# Patient Record
Sex: MALE
[De-identification: names, ages, dates, MRNs, and addresses within clinical notes are randomized per-mention and may not be internally consistent; named-entity substitution may affect disease eponyms.]

---

## 2020-02-11 ENCOUNTER — NURSE TRIAGE (OUTPATIENT)
Dept: OTHER | Facility: CLINIC | Age: 75
End: 2020-02-11

## 2023-09-03 ENCOUNTER — NURSE TRIAGE (OUTPATIENT)
Dept: OTHER | Facility: CLINIC | Age: 78
End: 2023-09-03

## 2024-02-22 NOTE — PROGRESS NOTES
"Reason for admission to [facility] for post-acute rehab septic shock , right leg cellulitis, and Atrial Fibrillation    Subjective   Mr. Ortega 78 y.o. year old male was admitted to [facility] unit on 2/22/24. Patient is seen for the initial SNF visit on 2/23/24. 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    Admitted from: [facility] hospital  Facility: [facility]  Dates of hospitalization: 2/16-2/22/24  Hospital Course:  Admitted for low blood pressures and went to the ICU for severe sepsis. Had BEKA with ATN on ckd3b. Seen by nephrology and cardiology. Propranolol stopped due to bradycardia. Kidney function improved. Gabapentin dose reduced. Treated for cellulitis of R leg. Had elevated troponin on admission. Also had hyponatremia, hypervolemia, anemia, long standing persistent afib. D/bri to [facility] 2/22 for rehab on renal diet, 2 gram sodium restriction and 1.2L fluid restriction.   Per discharge summary   \"Brady Ortega is a 78 year old male presented with past medical history of Parkinsonism, , CKD stage 3 with baseline Cr 1.3-1.6, suspected cardiac amyloidosis, PAF with slow VR, HTN, HPL, BPH, CAD, h/o lung cancer s/p LLL lobectomy who was initially admitted with cellulitis but then developed hypotension due to afib with slow VR and transferred to ICU.  In ICU, he received 1 dose of atropine with improvement in BP. Never required pressors. Did have BEKA over CKD and nephrology following.    Principal Problem:  Cellulitis of right lower extremity - resolved   --finished course of abx; appreciate ID input   Paronychia - R 4th finger   -id seeing o/p consulted here appreciate input - finished course of rx   Active " "Problems:  BEKA  CKD 3b  --nephrology following  --Cr peaked at 2.8, baseline is 1.3-1.6  --suspect ATN in setting of hypotension and also bactrim  --monitor Cr  --continue sensipar   Elevated troponin  --likely demand ischemic in setting of shock/BEKA  --no intervention needed  Chronic diastolic CHF  Suspected cardiac amyloidosis  Paroxysmal atrial fib with slow VR  --propranolol on hold due to bradycardia  --no evidence for decompensated CHF  --continue telemetry  --continue eliquis  Hyponatremia  --initially thought to be due to BEKA ; now more volume overload per nephrology  --fluid restriction and torsemide   Parkinsonism  Essential tremor  --continue sinemet  --propranolol on hold due to bradycardia  HTN  --no meds  BPH  --continue hytrin  Elevated TSH  --has been chronically elevated for at least 5 months  --on synthroid 88 mcg  --may need dose adjustment  --will defer to OP  --check repeat as OP  --possible early subclinical hypothyroidism\"     Primary caregiver: patient and wife  : wife: becca Ortega Office: [phone]   Home: [phone]    PCP: ashwini carpenter- [facility] [place].     HPI     Per patient:  - pt denies sob, cp, dizziness or lightheadedness  - states that he hasn't had trouble urinating like this in the past  - it started yesterday. He only urinated twice and small amounts. And today couldn't urinate and had suprapubic discomfort and bloating  - PVR showed 800cc in bladder.   - feels much better after the straight cath to remove the urine.   - and notes that his bowels started moving after as well  - has both parkinsons and essential tremor. Has been on sinemet for awhile. Does feel it helps.   - at home he was only taking 2 tabs 7am, 11am, 3pm, and then 2.5 tabs at 7pm.   - feeling very weak overall now.     Per wife  - pt started having burning with urination today   - kaela was taken out at the hospital on 2/21. He did pass trial of void in the hospital.   - pt " is being worked up for amyloidosis. Was going to have an MRI but then ended up in the hospital  - doses of torsemide have been fluctuating. Has had leg swelling in both legs. Monitors his weight daily. The dose was increased about 2 weeks ago to 20mg alternating with 40mg. But he gained weight. And so was supposed to take 40mg daily for 4-5 days but ended up in hospital.   - in hospital, they held his torsemide because of low bps initially. He got 20mg on 2/21 and got at least 20mg on 2/22. May have actually gotten 40mg that day.   - has history of back infection in 10/2023. Pt was hospitalized for weakness, confusion, chills, and back pain. Was hypotensive, febrile and bradycardic. Bcx positive for strept dysglactiae. CT abd/pelvis didn't show source. Echo didn't show any valve vegetations. MRI lumbar spine showed possible discitis at L4-5 with 2 collections along left psoas muscle. Also had left knee pain and joint fluid showed urate crystals. Treated with colchicine and anakinra for 3 days. And was treated with vanc/levoquin for bactermia. D/bri on levaquin for 6 weeks.   - had been given tramadol in the hospital in October for back pain and was d/bri with some. Took it a couple of times only.   - she asks for some tramadol prn here just in case  - at home he was taking tylenol 3-4 times per day   - also had been prescribed gabapentin after hospitalization at 100mg HS. And dr. Sanchez increased it to 200mg TID. But it made him very sleepy and so they stopped it. But was getting it in hospital again.   - has history of bad athlete's foot. Was being treated with fluconazole by ID    Living environment prior to admission: lives in house with wife,     Prior to hospitalization, he was driving and walking with cane.     Assistive Devices: straight cane    ADLS prior to admission   Is dependant or requires assistance in the following BADL: none   Is dependant or requires assistance in the following Instrumental ADL:   was driving. Was managing meds.     Advanced Directives on file: full      Medications reviewed and reconciled.     Objective     Physical Exam  Vitals: 113//73, HR 55-68, T 97.5, 18, 95% RA. Wt 132 lbs  Constitutional:  Well-nourished, kempt  Head: NC/AT  Eyes: no scleral injection or icterus    Dentition: fair    Oropharyx: clear    Neck: supple. trachea midline. Thyroid not enlarged  Lymphatics: No adenopathy at neck  Respiratory: clear without rales, rhonchi or wheezes  Cardiovascular: RRR, +S1, S2, no murmurs appreciated    Extremeties: 2+ pitting edema in both legs.  No lubbing, cyanosis  Gastrointestinal: +BS, soft, nontender. Slightly distended  Genitourinary: no sherwood. Still has slight suprapubic distension  Integumentary: No ulcers, pressure sores. Right leg is not erythematous or warm. But does have petechial lesions on lower leg. Markings from outline of his cellulitis from the hospital.   Has overhanging skin on pinky side of the R 4th finger nail. Doesn't appear infected. Was previously a paranoychia  Musculoskeletal:    Contractures: none    Muscle bulk: nl    Digits/nails: dried and peeling area between 1st and 2nd toes of left foot    Effusions: none   Neurologic:  Pill rolling tremor in right first finger and fine tremor in bboth hands when outstretched.     CNII-XII: grossly intact     tone: mostly normal in both arms   Psychologic: affect full     Labs done at [facility] 2/23/24  Glucose 108, na 132, potassium 4.7, cl 97, c02 18, bun 80, creatinine 2.9, calcium 8.3,   Wbc 9.5, hgb 11.9, hematocrit 36.0, plts 16t6, mcv 91.5.   Vitamin D 25-OH pending    2/22/24  Glucose  74 - 99 mg/dL 98   BUN  9 - 24 mg/dL 73 High    Creatinine  0.73 - 1.22 mg/dL 2.11 High    Sodium  136 - 144 mmol/L 129 Low    Potassium  3.7 - 5.1 mmol/L 4.7   Chloride  97 - 105 mmol/L 97   CO2  22 - 30 mmol/L 21 Low    Anion Gap  9 - 18 mmol/L 11   Calcium, Total  8.5 - 10.2 mg/dL 8.2 Low    Estimated Glomerular Filtration  "Rate  >=60 mL/min/1.73m² 31 Low      Albumin  3.9 - 4.9 g/dL 3.0 Low    Calcium, Total  8.5 - 10.2 mg/dL 8.2 Low    Phosphorus  2.7 - 4.8 mg/dL 3.6     BUN  9 - 24 mg/dL 73 High    Creatinine  0.73 - 1.22 mg/dL 2.11 High    Sodium  136 - 144 mmol/L 129 Low    Potassium  3.7 - 5.1 mmol/L 4.7   Chloride  97 - 105 mmol/L 97   CO2  22 - 30 mmol/L 21 Low    Anion Gap  9 - 18 mmol/L 11   Estimated Glomerular Filtration Rate  >=60 mL/min/1.73m² 31 Low    Magnesium 2.0  WBC  3.70 - 11.00 k/uL 7.70   Hemoglobin  13.0 - 17.0 g/dL 10.0 Low    Hematocrit  39.0 - 51.0 % 29.9 Low    MCV  80.0 - 100.0 fL 91.2   RDW-CV  11.5 - 15.0 % 15.4 High    Platelet Count  150 - 400 k/uL 105 Low        creatinine  Date Value Ref Range Status   02/22/2024 2.11 (H) 0.73 - 1.22 mg/dL Final   02/22/2024 2.11 (H) 0.73 - 1.22 mg/dL Final   02/21/2024 2.43 (H) 0.73 - 1.22 mg/dL Final   02/20/2024 2.54 (H) 0.73 - 1.22 mg/dL Final     Phosphorus 3.9 (2/20/24)    TSH 6.53 (2/26/24).  5.06 (2/2/24)  Free T4 1.4    NT pro BNP 17,419 (2/16/24)     (2/16/24)    Iron 26, tibc 306, transferrin sat 8.5%    Kidney ultrasound 2/20/24  \"IMPRESSION:   NO HYDRONEPHROSIS.   Nonobstructing LEFT nephrolithiasis.   Multiple bilateral renal cysts, some minimally complex.   1.2 cm echogenic indeterminate RIGHT interpolar renal lesion corresponds to above fluid attenuation lesion on prior CT and is small to characterize may represent a hyperdense cyst or small renal neoplasm. \"    CXR: IMPRESSION: 2/16/24  No acute congestion.  Possible small bilateral pleural effusions.      Xray tib fib 2/16/24  \"Impression:   Right tib-fib negative for fracture.   No radiographic evidence for osteomyelitis. If osteomyelitis is a   clinical concern, a more sensitive examination such as MRI or nuclear  medicine bone scan should be obtained. \"    CT abd/pelvis 10/2023  \"Liver:  Slight interval increase since 2016 in the RIGHT lobe   low-attenuation lesions; 2.9 cm segment VII " "previously 1.5 cm (3:33), 1.4 cm segment VI lesion previously 0.8 cm (3:56) and a 2.6 cm segment V lesion along the gallbladder fossa previously 2.2 cm (3:59).   Biliary: Cholelithiasis.   Spleen: No splenomegaly.   Pancreas: Unremarkable.   Adrenals: No mass.   Kidneys:   Right: 1.2 cm exophytic hyperattenuating lesion likely a   hemorrhagic/proteinaceous cyst (3:44). Tiny lower pole calculus. No hydronephrosis.   Left: 1.5 cm hyperattenuating lesion likely a hemorrhagic/proteinaceous cyst (3:60).  Simple right upper pole cyst.  5 mm lower pole calculus. No hydronephrosis.   GI Tract: No bowel dilation.   Lymph Nodes: No lymphadenopathy.   Mesentery/peritoneum: No organized fluid collection.  Moderate volume ascites.   Retroperitoneum: No mass.   Vasculature: Arterial atherosclerotic disease without aneurysm.   Pelvis: Ascites is more confluent along the right anterior pelvis.  No mass.  Moderately distended urinary bladder without focal wall thickening.   Bones/Soft Tissues: LEFT hip arthroplasty.  Degenerative changes but no destructive lesion..   Lower thorax: Small right pleural effusion with adjacent atelectasis.  Trace left pleural effusion.  Left lower lobe loculated sub-pulmonic pneumothorax similar to the recent chest CT. Postsurgical changes of left lower lobectomy. \"    Assessment/Plan    Right lower leg cellulitis  - xray in hospital did not show evidence of osteomyelitis. He was treated with vanc and zosyn for 7 days in the hospital. The leg currently is not erythematous or warm but has resolving fine petechiae     2. Physical deconditioning  . Prior to hospitalization, had been walking with cane. Now is significantly weak and unstable with ambulation. PT, OT- eval and treat    3. Urinary retention   4. Dysuria   - he had a sherwood catheter in the hospital. This was removed on 2/21. He tolerated trial of void per pt and wife. Was urinating well until today. Has had decreased urine output and reported " dysuria. A post void bladder scan today showed 800+ml in bladder. He was straight cathed. He is on terazosin 5mg HS. Will send urine for UA and C&S. Will start him empirically on ciprofloxacin 250mg BID for 7 days.     5. BEKA on CKD  - creatinine went up to 2.8 in the hospital. But did improve to 2.1 on 2/22. Today his creatinine went back up to 2.9 and BUN 80. Urinary retention and infection likely contributing. Will do STAT renal panel 2/24    5. Hyponatremia  - his sodium was 129 on 2/22, and 132 today. Given the increase in bun and creatinine as well from yesterday,do wonder if there is some intravascular contraction. Will recheck renal panel on 2/24    6. Anemia  Hemoglobin was 10 yesterday and is 11.9 today. All of his blood cell counts have increase slightly from yesterday. Do wonder if there is some intravascular contraction. Will repeat cbc 2/24    7. HFpEF, question of cardiac amyloidosis   - in the hospital, it was felt that his hyponatremia was related in part to hypervolemia. His BNP was 17,000. Prior to hospitalization, his torsemide dose was increased to 20mg alternating with 40mgs about 2 weeks ago. But his weight was still increasing. So was told to take 40mg daily for 4-5 days. Had just started it before hospitalization. His torsemide was held for at least 4 days in hospital due to hypotension. Then was resumed 2/21. He has 2+ swelling in legs currently. No shortness of breath. Lungs clear. No hypoxia.  Checking daily weights. Will keep the torsemide at 40mg alternating with 20mg daily for now.      8. Afib with bradycardia  - on apixaban 2.5mg twice daily for anticoagulation. He was previously on propranolol for essential tremor. This was stopped in the hospital due to bradycardia. Will monitor heart rates. Will continue off of     9. Parkinson's disease  10. Essential tremor   - is on carbidopa-levodopa 2 tabs at 7am and 11am, and 2.5 tabs at 3pm and 7pm. He was taking the 2.5 tabs only at 7pm at  home. Can consider reducing to what he was taking at home.     11. Neuropathy in hands   12. Chronic low back pain  - had discitis and bacteremia in 10/2023. After that has had intermittent low back pain. Was taking tylenol regularly at home. Will restart this at 1000mg BID and then afternoon PRN. Also had tramadol prn at home though rarely used it. His wife asks for this to be continued. Will order 50mg BID prn  Was also previously prescribed gabapentin for tingling in his hands. Didn't tolerate it in morning and was mainly taking at night. Will give 100mg HS. He is encouraged to wear nighttime splints for carpal tunnel, though suspect that the neuropathy is more related to cervical disease.     13. Hyperparathyroidism  - his PTH in hospital was 210. His calcium levels are not elevated. Was 8.3 today. Is on cincalcet 30mg every other day. Will continue. Also on vitamin D 1000 units daily. Will continue    14. Hypothyroidism   - is on levothyroxine 88mcg daily. His tsh has been slightly elevated. It was 5.06 on 2/2/24 and 6.53 on 2/26/24. Free T4 was high normal. Will have repeat PFTs in 6-8 weeks with his PCP.     15. Goals of care counseling discussion   - wishes to be full code for now. They did experience having to withdraw care for a daughter in the past.     16. Allergic rhinitis: on ipratropium nasal spray and azelastine nasal spray. Will continue.      17. GERD  - on famotidine 20mg daily at bedtime. Will continue for now but can consider stopping     18. Athlete's foot  - has history of athlete's foot between left 1st and 2nd toes. Also recently had paronychia of R fourth finger.  Had I&D of the santa by derm on 1/24 with cultures growing MRSA and yeast. Finger not currently infected. Was given fluconazole. The plan was to stop it once he left hospital.     Disposition: goal will likely be for him to return home with his wife upon discharge. Will determine his discharge needs with pt/wife and interdiscplinary  team during his stay.     Marilee Ramsey MD

## 2024-02-23 ENCOUNTER — NURSING HOME VISIT (OUTPATIENT)
Dept: POST ACUTE CARE | Facility: EXTERNAL LOCATION | Age: 79
End: 2024-02-23
Payer: COMMERCIAL

## 2024-02-23 DIAGNOSIS — R30.0 DYSURIA: ICD-10-CM

## 2024-02-23 DIAGNOSIS — M54.9 CHRONIC BACK PAIN, UNSPECIFIED BACK LOCATION, UNSPECIFIED BACK PAIN LATERALITY: ICD-10-CM

## 2024-02-23 DIAGNOSIS — R53.81 PHYSICAL DECONDITIONING: ICD-10-CM

## 2024-02-23 DIAGNOSIS — I50.30 HEART FAILURE WITH PRESERVED EJECTION FRACTION, UNSPECIFIED HF CHRONICITY (MULTI): ICD-10-CM

## 2024-02-23 DIAGNOSIS — E87.1 HYPONATREMIA: ICD-10-CM

## 2024-02-23 DIAGNOSIS — N18.9 ACUTE KIDNEY INJURY SUPERIMPOSED ON CKD (CMS-HCC): ICD-10-CM

## 2024-02-23 DIAGNOSIS — L03.115 CELLULITIS OF RIGHT LEG: Primary | ICD-10-CM

## 2024-02-23 DIAGNOSIS — E21.3 HYPERPARATHYROIDISM (MULTI): ICD-10-CM

## 2024-02-23 DIAGNOSIS — I48.91 ATRIAL FIBRILLATION, UNSPECIFIED TYPE (MULTI): ICD-10-CM

## 2024-02-23 DIAGNOSIS — G89.29 CHRONIC BACK PAIN, UNSPECIFIED BACK LOCATION, UNSPECIFIED BACK PAIN LATERALITY: ICD-10-CM

## 2024-02-23 DIAGNOSIS — E03.9 HYPOTHYROIDISM, UNSPECIFIED TYPE: ICD-10-CM

## 2024-02-23 DIAGNOSIS — G20.A1 PARKINSON'S DISEASE WITHOUT DYSKINESIA OR FLUCTUATING MANIFESTATIONS (MULTI): ICD-10-CM

## 2024-02-23 DIAGNOSIS — G62.9 NEUROPATHY: ICD-10-CM

## 2024-02-23 DIAGNOSIS — R33.9 URINARY RETENTION: ICD-10-CM

## 2024-02-23 DIAGNOSIS — N17.9 ACUTE KIDNEY INJURY SUPERIMPOSED ON CKD (CMS-HCC): ICD-10-CM

## 2024-02-23 DIAGNOSIS — D64.9 ANEMIA, UNSPECIFIED TYPE: ICD-10-CM

## 2024-02-23 PROCEDURE — 99306 1ST NF CARE HIGH MDM 50: CPT | Performed by: INTERNAL MEDICINE

## 2024-02-23 RX ORDER — TRAMADOL HYDROCHLORIDE 50 MG/1
50 TABLET ORAL EVERY 12 HOURS PRN
Qty: 10 TABLET | Refills: 0 | Status: SHIPPED | OUTPATIENT
Start: 2024-02-23 | End: 2024-03-05 | Stop reason: SDUPTHER

## 2024-02-23 NOTE — LETTER
"Patient: Brady Ortega  : 1945    Encounter Date: 2024    Reason for admission to [facility] for post-acute rehab septic shock , right leg cellulitis, and Atrial Fibrillation    Subjective   Mr. Ortega 78 y.o. year old male was admitted to [facility] Skilled Nursing unit on 24. Patient is seen for the initial SNF visit on 24. 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    Admitted from: [facility] hospital  Facility: [facility]  Dates of hospitalization: -24  Hospital Course:  Admitted for low blood pressures and went to the ICU for severe sepsis. Had BEKA with ATN on ckd3b. Seen by nephrology and cardiology. Propranolol stopped due to bradycardia. Kidney function improved. Gabapentin dose reduced. Treated for cellulitis of R leg. Had elevated troponin on admission. Also had hyponatremia, hypervolemia, anemia, long standing persistent afib. D/bri to [facility]  for rehab on renal diet, 2 gram sodium restriction and 1.2L fluid restriction.   Per discharge summary   \"Brady Ortega is a 78 year old male presented with past medical history of Parkinsonism, , CKD stage 3 with baseline Cr 1.3-1.6, suspected cardiac amyloidosis, PAF with slow VR, HTN, HPL, BPH, CAD, h/o lung cancer s/p LLL lobectomy who was initially admitted with cellulitis but then developed hypotension due to afib with slow VR and transferred to ICU.  In ICU, he received 1 dose of atropine with improvement in BP. Never required pressors. Did have BEKA over CKD and nephrology following.    Principal Problem:  Cellulitis of right lower extremity - resolved   --finished course of abx; appreciate ID input   Paronychia - R 4th finger   -id seeing o/p " "consulted here appreciate input - finished course of rx   Active Problems:  BEKA  CKD 3b  --nephrology following  --Cr peaked at 2.8, baseline is 1.3-1.6  --suspect ATN in setting of hypotension and also bactrim  --monitor Cr  --continue sensipar   Elevated troponin  --likely demand ischemic in setting of shock/BEKA  --no intervention needed  Chronic diastolic CHF  Suspected cardiac amyloidosis  Paroxysmal atrial fib with slow VR  --propranolol on hold due to bradycardia  --no evidence for decompensated CHF  --continue telemetry  --continue eliquis  Hyponatremia  --initially thought to be due to BEKA ; now more volume overload per nephrology  --fluid restriction and torsemide   Parkinsonism  Essential tremor  --continue sinemet  --propranolol on hold due to bradycardia  HTN  --no meds  BPH  --continue hytrin  Elevated TSH  --has been chronically elevated for at least 5 months  --on synthroid 88 mcg  --may need dose adjustment  --will defer to OP  --check repeat as OP  --possible early subclinical hypothyroidism\"     Primary caregiver: patient and wife  : wife: becca Ortega Office: [phone]   Home: [phone]    PCP: ashwini carpenter- [facility].     HPI     Per patient:  - pt denies sob, cp, dizziness or lightheadedness  - states that he hasn't had trouble urinating like this in the past  - it started yesterday. He only urinated twice and small amounts. And today couldn't urinate and had suprapubic discomfort and bloating  - PVR showed 800cc in bladder.   - feels much better after the straight cath to remove the urine.   - and notes that his bowels started moving after as well  - has both parkinsons and essential tremor. Has been on sinemet for awhile. Does feel it helps.   - at home he was only taking 2 tabs 7am, 11am, 3pm, and then 2.5 tabs at 7pm.   - feeling very weak overall now.     Per wife  - pt started having burning with urination today   - kaela was taken out at the " hospital on 2/21. He did pass trial of void in the hospital.   - pt is being worked up for amyloidosis. Was going to have an MRI but then ended up in the hospital  - doses of torsemide have been fluctuating. Has had leg swelling in both legs. Monitors his weight daily. The dose was increased about 2 weeks ago to 20mg alternating with 40mg. But he gained weight. And so was supposed to take 40mg daily for 4-5 days but ended up in hospital.   - in hospital, they held his torsemide because of low bps initially. He got 20mg on 2/21 and got at least 20mg on 2/22. May have actually gotten 40mg that day.   - has history of back infection in 10/2023. Pt was hospitalized for weakness, confusion, chills, and back pain. Was hypotensive, febrile and bradycardic. Bcx positive for strept dysglactiae. CT abd/pelvis didn't show source. Echo didn't show any valve vegetations. MRI lumbar spine showed possible discitis at L4-5 with 2 collections along left psoas muscle. Also had left knee pain and joint fluid showed urate crystals. Treated with colchicine and anakinra for 3 days. And was treated with vanc/levoquin for bactermia. D/bri on levaquin for 6 weeks.   - had been given tramadol in the hospital in October for back pain and was d/bri with some. Took it a couple of times only.   - she asks for some tramadol prn here just in case  - at home he was taking tylenol 3-4 times per day   - also had been prescribed gabapentin after hospitalization at 100mg HS. And dr. Sanchez increased it to 200mg TID. But it made him very sleepy and so they stopped it. But was getting it in hospital again.   - has history of bad athlete's foot. Was being treated with fluconazole by ID    Living environment prior to admission: lives in house with wife,     Prior to hospitalization, he was driving and walking with cane.     Assistive Devices: straight cane    ADLS prior to admission   Is dependant or requires assistance in the following BADL: none   Is  dependant or requires assistance in the following Instrumental ADL:  was driving. Was managing meds.     Advanced Directives on file: full      Medications reviewed and reconciled.     Objective     Physical Exam  Vitals: 113//73, HR 55-68, T 97.5, 18, 95% RA. Wt 132 lbs  Constitutional:  Well-nourished, kempt  Head: NC/AT  Eyes: no scleral injection or icterus    Dentition: fair    Oropharyx: clear    Neck: supple. trachea midline. Thyroid not enlarged  Lymphatics: No adenopathy at neck  Respiratory: clear without rales, rhonchi or wheezes  Cardiovascular: RRR, +S1, S2, no murmurs appreciated    Extremeties: 2+ pitting edema in both legs.  No lubbing, cyanosis  Gastrointestinal: +BS, soft, nontender. Slightly distended  Genitourinary: no sherwood. Still has slight suprapubic distension  Integumentary: No ulcers, pressure sores. Right leg is not erythematous or warm. But does have petechial lesions on lower leg. Markings from outline of his cellulitis from the hospital.   Has overhanging skin on pinky side of the R 4th finger nail. Doesn't appear infected. Was previously a paranoychia  Musculoskeletal:    Contractures: none    Muscle bulk: nl    Digits/nails: dried and peeling area between 1st and 2nd toes of left foot    Effusions: none   Neurologic:  Pill rolling tremor in right first finger and fine tremor in bboth hands when outstretched.     CNII-XII: grossly intact     tone: mostly normal in both arms   Psychologic: affect full     Labs done at [facility] 2/23/24  Glucose 108, na 132, potassium 4.7, cl 97, c02 18, bun 80, creatinine 2.9, calcium 8.3,   Wbc 9.5, hgb 11.9, hematocrit 36.0, plts 16t6, mcv 91.5.   Vitamin D 25-OH pending    2/22/24  Glucose  74 - 99 mg/dL 98   BUN  9 - 24 mg/dL 73 High    Creatinine  0.73 - 1.22 mg/dL 2.11 High    Sodium  136 - 144 mmol/L 129 Low    Potassium  3.7 - 5.1 mmol/L 4.7   Chloride  97 - 105 mmol/L 97   CO2  22 - 30 mmol/L 21 Low    Anion Gap  9 - 18 mmol/L 11   Calcium,  "Total  8.5 - 10.2 mg/dL 8.2 Low    Estimated Glomerular Filtration Rate  >=60 mL/min/1.73m² 31 Low      Albumin  3.9 - 4.9 g/dL 3.0 Low    Calcium, Total  8.5 - 10.2 mg/dL 8.2 Low    Phosphorus  2.7 - 4.8 mg/dL 3.6     BUN  9 - 24 mg/dL 73 High    Creatinine  0.73 - 1.22 mg/dL 2.11 High    Sodium  136 - 144 mmol/L 129 Low    Potassium  3.7 - 5.1 mmol/L 4.7   Chloride  97 - 105 mmol/L 97   CO2  22 - 30 mmol/L 21 Low    Anion Gap  9 - 18 mmol/L 11   Estimated Glomerular Filtration Rate  >=60 mL/min/1.73m² 31 Low    Magnesium 2.0  WBC  3.70 - 11.00 k/uL 7.70   Hemoglobin  13.0 - 17.0 g/dL 10.0 Low    Hematocrit  39.0 - 51.0 % 29.9 Low    MCV  80.0 - 100.0 fL 91.2   RDW-CV  11.5 - 15.0 % 15.4 High    Platelet Count  150 - 400 k/uL 105 Low        creatinine  Date Value Ref Range Status   02/22/2024 2.11 (H) 0.73 - 1.22 mg/dL Final   02/22/2024 2.11 (H) 0.73 - 1.22 mg/dL Final   02/21/2024 2.43 (H) 0.73 - 1.22 mg/dL Final   02/20/2024 2.54 (H) 0.73 - 1.22 mg/dL Final     Phosphorus 3.9 (2/20/24)    TSH 6.53 (2/26/24).  5.06 (2/2/24)  Free T4 1.4    NT pro BNP 17,419 (2/16/24)     (2/16/24)    Iron 26, tibc 306, transferrin sat 8.5%    Kidney ultrasound 2/20/24  \"IMPRESSION:   NO HYDRONEPHROSIS.   Nonobstructing LEFT nephrolithiasis.   Multiple bilateral renal cysts, some minimally complex.   1.2 cm echogenic indeterminate RIGHT interpolar renal lesion corresponds to above fluid attenuation lesion on prior CT and is small to characterize may represent a hyperdense cyst or small renal neoplasm. \"    CXR: IMPRESSION: 2/16/24  No acute congestion.  Possible small bilateral pleural effusions.      Xray tib fib 2/16/24  \"Impression:   Right tib-fib negative for fracture.   No radiographic evidence for osteomyelitis. If osteomyelitis is a   clinical concern, a more sensitive examination such as MRI or nuclear  medicine bone scan should be obtained. \"    CT abd/pelvis 10/2023  \"Liver:  Slight interval increase since 2016 " "in the RIGHT lobe   low-attenuation lesions; 2.9 cm segment VII previously 1.5 cm (3:33), 1.4 cm segment VI lesion previously 0.8 cm (3:56) and a 2.6 cm segment V lesion along the gallbladder fossa previously 2.2 cm (3:59).   Biliary: Cholelithiasis.   Spleen: No splenomegaly.   Pancreas: Unremarkable.   Adrenals: No mass.   Kidneys:   Right: 1.2 cm exophytic hyperattenuating lesion likely a   hemorrhagic/proteinaceous cyst (3:44). Tiny lower pole calculus. No hydronephrosis.   Left: 1.5 cm hyperattenuating lesion likely a hemorrhagic/proteinaceous cyst (3:60).  Simple right upper pole cyst.  5 mm lower pole calculus. No hydronephrosis.   GI Tract: No bowel dilation.   Lymph Nodes: No lymphadenopathy.   Mesentery/peritoneum: No organized fluid collection.  Moderate volume ascites.   Retroperitoneum: No mass.   Vasculature: Arterial atherosclerotic disease without aneurysm.   Pelvis: Ascites is more confluent along the right anterior pelvis.  No mass.  Moderately distended urinary bladder without focal wall thickening.   Bones/Soft Tissues: LEFT hip arthroplasty.  Degenerative changes but no destructive lesion..   Lower thorax: Small right pleural effusion with adjacent atelectasis.  Trace left pleural effusion.  Left lower lobe loculated sub-pulmonic pneumothorax similar to the recent chest CT. Postsurgical changes of left lower lobectomy. \"    Assessment/Plan    Right lower leg cellulitis  - xray in hospital did not show evidence of osteomyelitis. He was treated with vanc and zosyn for 7 days in the hospital. The leg currently is not erythematous or warm but has resolving fine petechiae     2. Physical deconditioning  . Prior to hospitalization, had been walking with cane. Now is significantly weak and unstable with ambulation. PT, OT- eval and treat    3. Urinary retention   4. Dysuria   - he had a sherwood catheter in the hospital. This was removed on 2/21. He tolerated trial of void per pt and wife. Was urinating " well until today. Has had decreased urine output and reported dysuria. A post void bladder scan today showed 800+ml in bladder. He was straight cathed. He is on terazosin 5mg HS. Will send urine for UA and C&S. Will start him empirically on ciprofloxacin 250mg BID for 7 days.     5. BEKA on CKD  - creatinine went up to 2.8 in the hospital. But did improve to 2.1 on 2/22. Today his creatinine went back up to 2.9 and BUN 80. Urinary retention and infection likely contributing. Will do STAT renal panel 2/24    5. Hyponatremia  - his sodium was 129 on 2/22, and 132 today. Given the increase in bun and creatinine as well from yesterday,do wonder if there is some intravascular contraction. Will recheck renal panel on 2/24    6. Anemia  Hemoglobin was 10 yesterday and is 11.9 today. All of his blood cell counts have increase slightly from yesterday. Do wonder if there is some intravascular contraction. Will repeat cbc 2/24    7. HFpEF, question of cardiac amyloidosis   - in the hospital, it was felt that his hyponatremia was related in part to hypervolemia. His BNP was 17,000. Prior to hospitalization, his torsemide dose was increased to 20mg alternating with 40mgs about 2 weeks ago. But his weight was still increasing. So was told to take 40mg daily for 4-5 days. Had just started it before hospitalization. His torsemide was held for at least 4 days in hospital due to hypotension. Then was resumed 2/21. He has 2+ swelling in legs currently. No shortness of breath. Lungs clear. No hypoxia.  Checking daily weights. Will keep the torsemide at 40mg alternating with 20mg daily for now.      8. Afib with bradycardia  - on apixaban 2.5mg twice daily for anticoagulation. He was previously on propranolol for essential tremor. This was stopped in the hospital due to bradycardia. Will monitor heart rates. Will continue off of     9. Parkinson's disease  10. Essential tremor   - is on carbidopa-levodopa 2 tabs at 7am and 11am, and 2.5  tabs at 3pm and 7pm. He was taking the 2.5 tabs only at 7pm at home. Can consider reducing to what he was taking at home.     11. Neuropathy in hands   12. Chronic low back pain  - had discitis and bacteremia in 10/2023. After that has had intermittent low back pain. Was taking tylenol regularly at home. Will restart this at 1000mg BID and then afternoon PRN. Also had tramadol prn at home though rarely used it. His wife asks for this to be continued. Will order 50mg BID prn  Was also previously prescribed gabapentin for tingling in his hands. Didn't tolerate it in morning and was mainly taking at night. Will give 100mg HS. He is encouraged to wear nighttime splints for carpal tunnel, though suspect that the neuropathy is more related to cervical disease.     13. Hyperparathyroidism  - his PTH in hospital was 210. His calcium levels are not elevated. Was 8.3 today. Is on cincalcet 30mg every other day. Will continue. Also on vitamin D 1000 units daily. Will continue    14. Hypothyroidism   - is on levothyroxine 88mcg daily. His tsh has been slightly elevated. It was 5.06 on 2/2/24 and 6.53 on 2/26/24. Free T4 was high normal. Will have repeat PFTs in 6-8 weeks with his PCP.     15. Goals of care counseling discussion   - wishes to be full code for now. They did experience having to withdraw care for a daughter in the past.     16. Allergic rhinitis: on ipratropium nasal spray and azelastine nasal spray. Will continue.      17. GERD  - on famotidine 20mg daily at bedtime. Will continue for now but can consider stopping     18. Athlete's foot  - has history of athlete's foot between left 1st and 2nd toes. Also recently had paronychia of R fourth finger.  Had I&D of the santa by derm on 1/24 with cultures growing MRSA and yeast. Finger not currently infected. Was given fluconazole. The plan was to stop it once he left hospital.     Disposition: goal will likely be for him to return home with his wife upon discharge. Will  determine his discharge needs with pt/wife and interdiscplinary team during his stay.     Marilee Ramsey MD      Electronically Signed By: Marilee Ramsey MD   2/24/24 11:31 AM

## 2024-02-24 PROBLEM — E21.3 HYPERPARATHYROIDISM (MULTI): Status: ACTIVE | Noted: 2024-02-24

## 2024-02-24 PROBLEM — G89.29 CHRONIC BACK PAIN: Status: ACTIVE | Noted: 2024-02-24

## 2024-02-24 PROBLEM — R30.0 DYSURIA: Status: ACTIVE | Noted: 2024-02-24

## 2024-02-24 PROBLEM — I50.30 HEART FAILURE WITH PRESERVED EJECTION FRACTION (MULTI): Status: ACTIVE | Noted: 2024-02-24

## 2024-02-24 PROBLEM — M54.9 CHRONIC BACK PAIN: Status: ACTIVE | Noted: 2024-02-24

## 2024-02-24 PROBLEM — G20.A1 PARKINSON'S DISEASE WITHOUT DYSKINESIA OR FLUCTUATING MANIFESTATIONS (MULTI): Status: ACTIVE | Noted: 2024-02-24

## 2024-02-24 PROBLEM — N17.9 ACUTE KIDNEY INJURY SUPERIMPOSED ON CKD (CMS-HCC): Status: ACTIVE | Noted: 2024-02-24

## 2024-02-24 PROBLEM — N18.9 ACUTE KIDNEY INJURY SUPERIMPOSED ON CKD (CMS-HCC): Status: ACTIVE | Noted: 2024-02-24

## 2024-02-24 PROBLEM — R53.81 PHYSICAL DECONDITIONING: Status: ACTIVE | Noted: 2024-02-24

## 2024-02-24 PROBLEM — E03.9 HYPOTHYROIDISM: Status: ACTIVE | Noted: 2024-02-24

## 2024-02-24 PROBLEM — E87.1 HYPONATREMIA: Status: ACTIVE | Noted: 2024-02-24

## 2024-02-24 PROBLEM — G62.9 NEUROPATHY: Status: ACTIVE | Noted: 2024-02-24

## 2024-02-24 PROBLEM — I48.91 ATRIAL FIBRILLATION (MULTI): Status: ACTIVE | Noted: 2024-02-24

## 2024-02-24 PROBLEM — R33.9 URINARY RETENTION: Status: ACTIVE | Noted: 2024-02-24

## 2024-02-24 PROBLEM — L03.115 CELLULITIS OF RIGHT LEG: Status: ACTIVE | Noted: 2024-02-24

## 2024-02-24 PROBLEM — D64.9 ANEMIA: Status: ACTIVE | Noted: 2024-02-24

## 2024-02-27 ENCOUNTER — NURSING HOME VISIT (OUTPATIENT)
Dept: POST ACUTE CARE | Facility: EXTERNAL LOCATION | Age: 79
End: 2024-02-27
Payer: COMMERCIAL

## 2024-02-27 DIAGNOSIS — I50.30 HEART FAILURE WITH PRESERVED EJECTION FRACTION, UNSPECIFIED HF CHRONICITY (MULTI): ICD-10-CM

## 2024-02-27 DIAGNOSIS — R53.81 PHYSICAL DECONDITIONING: ICD-10-CM

## 2024-02-27 DIAGNOSIS — M62.838 MUSCLE SPASMS OF NECK: ICD-10-CM

## 2024-02-27 DIAGNOSIS — G62.9 NEUROPATHY: ICD-10-CM

## 2024-02-27 DIAGNOSIS — G89.29 CHRONIC BACK PAIN, UNSPECIFIED BACK LOCATION, UNSPECIFIED BACK PAIN LATERALITY: ICD-10-CM

## 2024-02-27 DIAGNOSIS — N17.9 ACUTE KIDNEY INJURY SUPERIMPOSED ON CKD (CMS-HCC): ICD-10-CM

## 2024-02-27 DIAGNOSIS — I48.91 ATRIAL FIBRILLATION, UNSPECIFIED TYPE (MULTI): ICD-10-CM

## 2024-02-27 DIAGNOSIS — E87.1 HYPONATREMIA: ICD-10-CM

## 2024-02-27 DIAGNOSIS — E03.9 HYPOTHYROIDISM, UNSPECIFIED TYPE: ICD-10-CM

## 2024-02-27 DIAGNOSIS — R60.9 EDEMA, UNSPECIFIED TYPE: ICD-10-CM

## 2024-02-27 DIAGNOSIS — L03.115 CELLULITIS OF RIGHT LEG: Primary | ICD-10-CM

## 2024-02-27 DIAGNOSIS — D64.9 ANEMIA, UNSPECIFIED TYPE: ICD-10-CM

## 2024-02-27 DIAGNOSIS — N18.9 ACUTE KIDNEY INJURY SUPERIMPOSED ON CKD (CMS-HCC): ICD-10-CM

## 2024-02-27 DIAGNOSIS — G20.A1 PARKINSON'S DISEASE WITHOUT DYSKINESIA OR FLUCTUATING MANIFESTATIONS (MULTI): ICD-10-CM

## 2024-02-27 DIAGNOSIS — M54.9 CHRONIC BACK PAIN, UNSPECIFIED BACK LOCATION, UNSPECIFIED BACK PAIN LATERALITY: ICD-10-CM

## 2024-02-27 DIAGNOSIS — R33.9 URINARY RETENTION: ICD-10-CM

## 2024-02-27 DIAGNOSIS — E21.3 HYPERPARATHYROIDISM (MULTI): ICD-10-CM

## 2024-02-27 PROCEDURE — 99309 SBSQ NF CARE MODERATE MDM 30: CPT | Performed by: NURSE PRACTITIONER

## 2024-02-27 RX ORDER — FAMOTIDINE 20 MG/1
TABLET, FILM COATED ORAL NIGHTLY
COMMUNITY

## 2024-02-27 RX ORDER — LEVOTHYROXINE SODIUM 88 UG/1
88 TABLET ORAL
COMMUNITY
Start: 2024-01-08

## 2024-02-27 RX ORDER — ACETAMINOPHEN 500 MG
1000 TABLET ORAL 2 TIMES DAILY
COMMUNITY
End: 2024-03-25 | Stop reason: HOSPADM

## 2024-02-27 RX ORDER — GLUCOSAMINE/CHONDROITIN/C/MANG 500-400 MG
2 CAPSULE ORAL DAILY
COMMUNITY
Start: 2014-07-01

## 2024-02-27 RX ORDER — CINACALCET 30 MG/1
30 TABLET, FILM COATED ORAL DAILY
COMMUNITY
End: 2024-03-25 | Stop reason: HOSPADM

## 2024-02-27 RX ORDER — GABAPENTIN 100 MG/1
100 CAPSULE ORAL DAILY PRN
COMMUNITY
End: 2024-03-25 | Stop reason: HOSPADM

## 2024-02-27 RX ORDER — VIT C/E/ZN/COPPR/LUTEIN/ZEAXAN 250MG-90MG
25 CAPSULE ORAL DAILY
COMMUNITY

## 2024-02-27 RX ORDER — CARBIDOPA AND LEVODOPA 25; 100 MG/1; MG/1
1 TABLET ORAL
COMMUNITY
End: 2024-03-25 | Stop reason: HOSPADM

## 2024-02-27 RX ORDER — EPINEPHRINE 0.22MG
200 AEROSOL WITH ADAPTER (ML) INHALATION
COMMUNITY
Start: 2011-03-30

## 2024-02-27 RX ORDER — IPRATROPIUM BROMIDE 21 UG/1
2 SPRAY, METERED NASAL 2 TIMES DAILY
COMMUNITY
Start: 2013-06-17

## 2024-02-27 RX ORDER — AZELASTINE 1 MG/ML
2 SPRAY, METERED NASAL 2 TIMES DAILY
COMMUNITY

## 2024-02-27 RX ORDER — ATORVASTATIN CALCIUM 20 MG/1
20 TABLET, FILM COATED ORAL DAILY
COMMUNITY

## 2024-02-27 RX ORDER — TORSEMIDE 20 MG/1
TABLET ORAL DAILY
Status: ON HOLD | COMMUNITY
Start: 2024-01-31 | End: 2024-03-25 | Stop reason: SDUPTHER

## 2024-02-27 RX ORDER — GABAPENTIN 100 MG/1
100 CAPSULE ORAL NIGHTLY
COMMUNITY
End: 2024-03-25 | Stop reason: HOSPADM

## 2024-02-27 RX ORDER — BISMUTH SUBSALICYLATE 262 MG
1 TABLET,CHEWABLE ORAL DAILY
COMMUNITY

## 2024-02-27 RX ORDER — DICLOFENAC SODIUM 10 MG/G
4 GEL TOPICAL 2 TIMES DAILY PRN
COMMUNITY

## 2024-02-27 RX ORDER — OLOPATADINE HYDROCHLORIDE 1 MG/ML
1 SOLUTION/ DROPS OPHTHALMIC 2 TIMES DAILY
COMMUNITY

## 2024-02-27 RX ORDER — METHOCARBAMOL 500 MG/1
500 TABLET, FILM COATED ORAL 2 TIMES DAILY PRN
Qty: 40 TABLET | Refills: 0 | Status: SHIPPED | OUTPATIENT
Start: 2024-02-27 | End: 2024-04-19 | Stop reason: WASHOUT

## 2024-02-27 RX ORDER — TERAZOSIN 5 MG/1
5 CAPSULE ORAL NIGHTLY PRN
COMMUNITY
Start: 2023-11-20 | End: 2024-03-25 | Stop reason: HOSPADM

## 2024-02-27 ASSESSMENT — ENCOUNTER SYMPTOMS
COUGH: 0
NECK PAIN: 1
CONSTIPATION: 0
SHORTNESS OF BREATH: 0
ABDOMINAL PAIN: 0
WHEEZING: 0
PALPITATIONS: 0
LIGHT-HEADEDNESS: 0
WEAKNESS: 1
SLEEP DISTURBANCE: 1
ABDOMINAL DISTENTION: 0
NECK STIFFNESS: 1

## 2024-02-27 NOTE — PROGRESS NOTES
"Reason for visit: fu deconditioning, renal status, edema    Subjective   HPI: Mr. Ortega 78 y.o. year old male was admitted to [facility] Nursing unit on 2/22/24. Patient is seen for the initial SNF visit on 2/23/24. 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     Admitted from: [facility] hospital  Facility: [facility]  Dates of hospitalization: 2/16-2/22/24  Hospital Course:  Admitted for low blood pressures and went to the ICU for severe sepsis. Had BEKA with ATN on ckd3b. Seen by nephrology and cardiology. Propranolol stopped due to bradycardia. Kidney function improved. Gabapentin dose reduced. Treated for cellulitis of R leg. Had elevated troponin on admission. Also had hyponatremia, hypervolemia, anemia, long standing persistent afib. D/bri to [facility] 2/22 for rehab on renal diet, 2 gram sodium restriction and 1.2L fluid restriction.   Per discharge summary      Primary caregiver: patient and wife  : wife: becca Ortega Office:  [phone]   Home:  [phone]     PCP: ashwini carpenter- [facility] [place]  Seen today for fu renal status, edema, deconditioning.   Pt in room, sitting up in chair, says he slept wrong his neck and cannot move it now without pain, R side. Denies CP, sob, abd pain. Pitting edema 4+ in legs and up to scrotum. Says he has worn teds in past but \"my wife says its a losing [place] to get me to use them\". Denny in place, he denies any discomfort from it.     Review of Systems   Respiratory:  Negative for cough, shortness of breath and wheezing.    Cardiovascular:  Positive for leg swelling. Negative for chest pain and palpitations.   Gastrointestinal:  Negative for abdominal distention, " abdominal pain and constipation.   Genitourinary:  Positive for scrotal swelling.        N o pain with sherwood    Musculoskeletal:  Positive for neck pain and neck stiffness.   Neurological:  Positive for weakness. Negative for light-headedness.   Psychiatric/Behavioral:  Positive for sleep disturbance (due to neck discomfort).        Objective   VS: reviewed in point click care   Current Vitals     BP: 105/56 mmHg  2/27/2024 01:05    Temp:97.7 °F  2/27/2024 01:05    Pulse:58 bpm  2/27/2024 01:05    Weight:147.6 Lbs  2/26/2024 12:05  Resp:14 Breaths/min  2/27/2024 01:05  BS:  O2:98 %  2/27/2024 01:05  Pain:0  2/27/2024 12:03      Physical Exam  Vitals reviewed.   Constitutional:       General: He is not in acute distress.     Appearance: He is normal weight.   HENT:      Head: Normocephalic and atraumatic.      Mouth/Throat:      Mouth: Mucous membranes are moist.      Pharynx: Oropharynx is clear.   Eyes:      Conjunctiva/sclera: Conjunctivae normal.   Cardiovascular:      Rate and Rhythm: Normal rate. Rhythm irregular.      Pulses: Normal pulses.      Heart sounds: Normal heart sounds.   Pulmonary:      Effort: Pulmonary effort is normal.      Breath sounds: Normal breath sounds.   Abdominal:      General: Abdomen is flat. Bowel sounds are normal.      Palpations: Abdomen is soft.   Musculoskeletal:      Cervical back: Tenderness (R neck pain in ttrapezius area) present.      Right lower leg: Edema present.      Left lower leg: Edema present.   Skin:     General: Skin is warm and dry.      Capillary Refill: Capillary refill takes less than 2 seconds.   Neurological:      Mental Status: He is alert. Mental status is at baseline.   Psychiatric:         Mood and Affect: Mood normal.     Lab work [id number] abnormals only glucose 64, RBC 3.18 H&H 10.4/30.8, RDW 17.6.  Vitamin D and B12 all pending results yet  Ultrasound retroperitoneal complete done on 2/26/2023; results right kidney measures 10.7 x 5.2 x 5.1, left kidney  12 x 6.2 x 6 cm.  Vascularity on the right appears decreased.  Increased cortical echogenicity bilaterally.  Inferior vena cava large at 2.5 cm.  2.5 cm liver cyst.  Increased cortical echogenicity suggests medical renal disease, vascularity appears decreased at the right kidney, large inferior vena cava can be present with hepatocellular disease.    Assessment/Plan   1. Edema, unspecified type  2. Cellulitis of right leg  -No osteomyelitis per x-ray in the hospital, treated thank you mycin and Zosyn for 7 days and, no longer on antibiotics at this time  - will add compression to help with edema, scrotal elevation  - Monitor    3. Muscle spasms of neck  -Will add Voltaren gel  - Per patient PT told him they could also use heat    4. Physical deconditioning  -PT and OT    5. Urinary retention  6. Acute kidney injury superimposed on CKD (CMS/HCC)  -Denny in place for retention  - Labs are stable today creatinine normal  - Ultrasound of kidney reviewed with wife and MD, recommend patient have follow-up with renal, no appointments listed at this time    7. Hyponatremia  -Stabilized, normal today    8. Anemia, unspecified type  -Stable     9. Heart failure with preserved ejection fraction, unspecified HF chronicity (CMS/HCC)  10. Atrial fibrillation, unspecified type (CMS/HCC)  -Torsemide; Weight today 147.2, 226 147.6, net loss 0.4 lb  - Sodium restricted diet  - Fluid restriction  - On apixaban twice daily, no signs of bleeding    11. Parkinson's disease without dyskinesia or fluctuating manifestations  -On Sinemet    12. Neuropathy in hands 13. Chronic back pain, unspecified back location, unspecified back pain laterality  -On gabapentin  - Tylenol around-the-clock and afternoon as needed dose  - Also has tramadol ordered as needed  - Encouraged to wear nighttime splints for carpal tunnel although MD notes indicate that neuropathy is probably more related to cervical disease    14. Hypothyroidism, unspecified  type  -Levothyroxine last TSH slightly elevated but free T3 were normal  - Recommend repeat thyroid tests in 6 to 8 weeks with PCP    15. Hyperparathyroidism (CMS/HCC)  -PTH in hospital to 10, calcium levels not elevated, continues on Cinacalcet and vitamin D    Med review  - reviewed in PCC upedated in epic    Code status  [place] code status: FULL    Dispo: treat muscle strain with volataren gel and methocarbamol. Add tubgrip for edema.     Luisana Peoples, XI-CNP  02/27/24

## 2024-02-27 NOTE — LETTER
"Patient: Brady Ortega  : 1945    Encounter Date: 2024    Reason for visit: fu deconditioning, renal status, edema    Subjective  HPI: Mr. Ortega 78 y.o. year old male was admitted to [facility] Skilled Nursing unit on 24. Patient is seen for the initial SNF visit on 24. 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     Admitted from: acute care hospital  Facility: [facility]  Dates of hospitalization: -24  Hospital Course:  Admitted for low blood pressures and went to the ICU for severe sepsis. Had BEKA with ATN on ckd3b. Seen by nephrology and cardiology. Propranolol stopped due to bradycardia. Kidney function improved. Gabapentin dose reduced. Treated for cellulitis of R leg. Had elevated troponin on admission. Also had hyponatremia, hypervolemia, anemia, long standing persistent afib. D/bri to [facility]  for rehab on renal diet, 2 gram sodium restriction and 1.2L fluid restriction.   Per discharge summary      Primary caregiver: patient and wife  : wife: becca Ortega Office:  [phone]   Home:  [phone]     PCP: ashwini carpenter- [facility] [place]  Seen today for fu renal status, edema, deconditioning.   Pt in room, sitting up in chair, says he slept wrong his neck and cannot move it now without pain, R side. Denies CP, sob, abd pain. Pitting edema 4+ in legs and up to scrotum. Says he has worn teds in past but \"my wife says its a losing [place]es [place] to get me to use them\". Denny in place, he denies any discomfort from it.     Review of Systems   Respiratory:  Negative for cough, shortness of breath and wheezing.    Cardiovascular:  Positive for leg swelling. Negative for chest pain and " palpitations.   Gastrointestinal:  Negative for abdominal distention, abdominal pain and constipation.   Genitourinary:  Positive for scrotal swelling.        N o pain with sherwood    Musculoskeletal:  Positive for neck pain and neck stiffness.   Neurological:  Positive for weakness. Negative for light-headedness.   Psychiatric/Behavioral:  Positive for sleep disturbance (due to neck discomfort).        Objective  VS: reviewed in point click care   Current Vitals     BP: 105/56 mmHg  2/27/2024 01:05    Temp:97.7 °F  2/27/2024 01:05    Pulse:58 bpm  2/27/2024 01:05    Weight:147.6 Lbs  2/26/2024 12:05  Resp:14 Breaths/min  2/27/2024 01:05  BS:  O2:98 %  2/27/2024 01:05  Pain:0  2/27/2024 12:03      Physical Exam  Vitals reviewed.   Constitutional:       General: He is not in acute distress.     Appearance: He is normal weight.   HENT:      Head: Normocephalic and atraumatic.      Mouth/Throat:      Mouth: Mucous membranes are moist.      Pharynx: Oropharynx is clear.   Eyes:      Conjunctiva/sclera: Conjunctivae normal.   Cardiovascular:      Rate and Rhythm: Normal rate. Rhythm irregular.      Pulses: Normal pulses.      Heart sounds: Normal heart sounds.   Pulmonary:      Effort: Pulmonary effort is normal.      Breath sounds: Normal breath sounds.   Abdominal:      General: Abdomen is flat. Bowel sounds are normal.      Palpations: Abdomen is soft.   Musculoskeletal:      Cervical back: Tenderness (R neck pain in ttrapezius area) present.      Right lower leg: Edema present.      Left lower leg: Edema present.   Skin:     General: Skin is warm and dry.      Capillary Refill: Capillary refill takes less than 2 seconds.   Neurological:      Mental Status: He is alert. Mental status is at baseline.   Psychiatric:         Mood and Affect: Mood normal.     Lab work 714182 abnormals only glucose 64, RBC 3.18 H&H 10.4/30.8, RDW 17.6.  Vitamin D and B12 all pending results yet  Ultrasound retroperitoneal complete done on  2/26/2023; results right kidney measures 10.7 x 5.2 x 5.1, left kidney 12 x 6.2 x 6 cm.  Vascularity on the right appears decreased.  Increased cortical echogenicity bilaterally.  Inferior vena cava large at 2.5 cm.  2.5 cm liver cyst.  Increased cortical echogenicity suggests medical renal disease, vascularity appears decreased at the right kidney, large inferior vena cava can be present with hepatocellular disease.    Assessment/Plan  1. Edema, unspecified type  2. Cellulitis of right leg  -No osteomyelitis per x-ray in the hospital, treated thank you mycin and Zosyn for 7 days and, no longer on antibiotics at this time  - will add compression to help with edema, scrotal elevation  - Monitor    3. Muscle spasms of neck  -Will add Voltaren gel  - Per patient PT told him they could also use heat    4. Physical deconditioning  -PT and OT    5. Urinary retention  6. Acute kidney injury superimposed on CKD (CMS/HCC)  -Denny in place for retention  - Labs are stable today creatinine normal  - Ultrasound of kidney reviewed with wife and MD, recommend patient have follow-up with renal, no appointments listed at this time    7. Hyponatremia  -Stabilized, normal today    8. Anemia, unspecified type  -Stable     9. Heart failure with preserved ejection fraction, unspecified HF chronicity (CMS/HCC)  10. Atrial fibrillation, unspecified type (CMS/HCC)  -Torsemide; Weight today 147.2, 226 147.6, net loss 0.4 lb  - Sodium restricted diet  - Fluid restriction  - On apixaban twice daily, no signs of bleeding    11. Parkinson's disease without dyskinesia or fluctuating manifestations  -On Sinemet    12. Neuropathy in hands 13. Chronic back pain, unspecified back location, unspecified back pain laterality  -On gabapentin  - Tylenol around-the-clock and afternoon as needed dose  - Also has tramadol ordered as needed  - Encouraged to wear nighttime splints for carpal tunnel although MD notes indicate that neuropathy is probably more  related to cervical disease    14. Hypothyroidism, unspecified type  -Levothyroxine last TSH slightly elevated but free T3 were normal  - Recommend repeat thyroid tests in 6 to 8 weeks with PCP    15. Hyperparathyroidism (CMS/[facility])  -PTH in hospital to 10, calcium levels not elevated, continues on Cinacalcet and vitamin D    Med review  - reviewed in PCC upedated in epic    Code status  [place] code status: FULL    Dispo: treat muscle strain with volataren gel and methocarbamol. Add tubgrip for edema.     ANEESH Morillo  02/27/24      Electronically Signed By: ANEESH Morillo   2/27/24  2:16 PM

## 2024-03-05 ENCOUNTER — NURSING HOME VISIT (OUTPATIENT)
Dept: POST ACUTE CARE | Facility: EXTERNAL LOCATION | Age: 79
End: 2024-03-05
Payer: COMMERCIAL

## 2024-03-05 DIAGNOSIS — S00.571A OTHER SUPERFICIAL BITE OF LIP, INITIAL ENCOUNTER: ICD-10-CM

## 2024-03-05 DIAGNOSIS — G20.A1 PARKINSON'S DISEASE WITHOUT DYSKINESIA OR FLUCTUATING MANIFESTATIONS (MULTI): ICD-10-CM

## 2024-03-05 DIAGNOSIS — M54.9 CHRONIC BACK PAIN, UNSPECIFIED BACK LOCATION, UNSPECIFIED BACK PAIN LATERALITY: ICD-10-CM

## 2024-03-05 DIAGNOSIS — G62.9 NEUROPATHY: ICD-10-CM

## 2024-03-05 DIAGNOSIS — N17.9 ACUTE KIDNEY INJURY SUPERIMPOSED ON CKD (CMS-HCC): ICD-10-CM

## 2024-03-05 DIAGNOSIS — G89.29 CHRONIC BACK PAIN, UNSPECIFIED BACK LOCATION, UNSPECIFIED BACK PAIN LATERALITY: ICD-10-CM

## 2024-03-05 DIAGNOSIS — I48.91 ATRIAL FIBRILLATION, UNSPECIFIED TYPE (MULTI): ICD-10-CM

## 2024-03-05 DIAGNOSIS — I50.30 HEART FAILURE WITH PRESERVED EJECTION FRACTION, UNSPECIFIED HF CHRONICITY (MULTI): ICD-10-CM

## 2024-03-05 DIAGNOSIS — R33.9 URINARY RETENTION: ICD-10-CM

## 2024-03-05 DIAGNOSIS — R53.81 PHYSICAL DECONDITIONING: ICD-10-CM

## 2024-03-05 DIAGNOSIS — N18.9 ACUTE KIDNEY INJURY SUPERIMPOSED ON CKD (CMS-HCC): ICD-10-CM

## 2024-03-05 DIAGNOSIS — R60.1 ANASARCA: Primary | ICD-10-CM

## 2024-03-05 PROCEDURE — 99309 SBSQ NF CARE MODERATE MDM 30: CPT | Performed by: NURSE PRACTITIONER

## 2024-03-05 RX ORDER — TRAMADOL HYDROCHLORIDE 50 MG/1
50 TABLET ORAL EVERY 12 HOURS PRN
Qty: 14 TABLET | Refills: 0 | Status: SHIPPED | OUTPATIENT
Start: 2024-03-05 | End: 2024-03-12 | Stop reason: SDUPTHER

## 2024-03-05 RX ORDER — METOLAZONE 2.5 MG/1
2.5 TABLET ORAL EVERY OTHER DAY
COMMUNITY
End: 2024-03-12 | Stop reason: SDUPTHER

## 2024-03-05 ASSESSMENT — ENCOUNTER SYMPTOMS
ABDOMINAL DISTENTION: 0
UNEXPECTED WEIGHT CHANGE: 0
CONSTIPATION: 0
ACTIVITY CHANGE: 1
SHORTNESS OF BREATH: 0
COUGH: 0
WEAKNESS: 1
APPETITE CHANGE: 0
DIFFICULTY URINATING: 1
ABDOMINAL PAIN: 0
PALPITATIONS: 0
WHEEZING: 0

## 2024-03-05 NOTE — LETTER
Patient: Brady Ortega  : 1945    Encounter Date: 2024    Reason for visit: edema    Subjective  HPI:: Mr. Ortega 78 y.o. year old male was admitted to [facility] Skilled Nursing unit on 24. Patient is seen for the initial SNF visit on 24. 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     Admitted from: acute care hospital  Facility: [facility]  Dates of hospitalization: -24  Hospital Course:  Admitted for low blood pressures and went to the ICU for severe sepsis. Had BEKA with ATN on ckd3b. Seen by nephrology and cardiology. Propranolol stopped due to bradycardia. Kidney function improved. Gabapentin dose reduced. Treated for cellulitis of R leg. Had elevated troponin on admission. Also had hyponatremia, hypervolemia, anemia, long standing persistent afib. D/bri to [facility]  for rehab on renal diet, 2 gram sodium restriction and 1.2L fluid restriction.       Primary caregiver: patient and wife  : wife: becca Ortega Office:  [phone]   Home:  [phone]     PCP: ashwini carpenter- [facility] [place]    Seen today in room for follow-up edema and lower lip lesion, daughter says patient occasionally bites his lip ; will use emollient and or Chapstick until healed. Re: edema, pt on torsemide, got 40 mg extra dose last evening but gained overnight. Family concerned that tubigrips are more uncomfortable for patient because he cannot wear his shoes due to the extra layers and concerns that fluid is being compressed and ending up more edematous in the thigh areas.  They would like to try holding the Tubigrip in the morning prior to therapy to see if he can get his shoes on so he can participate  better in therapy.  Agreeable.  Discussion with Dr. Ramsey regarding efficacy of torsemide, will try adding Bumex prior to dose.  No other new complaints from patient.    Review of Systems   Constitutional:  Positive for activity change (Having more difficulty participating in therapy due to inability to put his shoes on). Negative for appetite change and unexpected weight change.   Respiratory:  Negative for cough, shortness of breath and wheezing.    Cardiovascular:  Positive for leg swelling. Negative for chest pain and palpitations.   Gastrointestinal:  Negative for abdominal distention, abdominal pain and constipation.   Genitourinary:  Positive for difficulty urinating (sherwood).   Musculoskeletal:  Positive for gait problem.   Neurological:  Positive for weakness.       Objective  VS: reviewed in point click care BP: 119/61 mmHg  3/5/2024 09:09    Temp:96.3 °F  3/5/2024 09:09  Pulse:71 bpm  3/5/2024 09:09    Weight:151.6 Lbs ###wts are increasing despite torsemide   3/5/2024 13:12  Resp:14 Breaths/min  3/5/2024 09:09  BS:  O2:98 %  3/5/2024 09:09  Pain:2  3/5/2024 08:37    Physical Exam  Vitals reviewed.   Constitutional:       General: He is not in acute distress.  HENT:      Head: Normocephalic and atraumatic.      Nose: Nose normal.      Mouth/Throat:      Mouth: Mucous membranes are moist.      Pharynx: Oropharynx is clear.   Eyes:      Conjunctiva/sclera: Conjunctivae normal.   Cardiovascular:      Rate and Rhythm: Normal rate and regular rhythm.      Heart sounds: Normal heart sounds. No murmur heard.  Pulmonary:      Effort: Pulmonary effort is normal.      Breath sounds: Normal breath sounds.   Abdominal:      General: Bowel sounds are normal.      Palpations: Abdomen is soft.   Genitourinary:     Comments: Sherwood in place draining yellow urine  Musculoskeletal:         General: Normal range of motion.      Cervical back: Normal range of motion and neck supple.      Right lower leg: Edema present.       Left lower leg: Edema present.      Comments: +3 pitting edema lower extremities all the way up to the hips and scrotum.   Skin:     General: Skin is warm and dry.      Capillary Refill: Capillary refill takes less than 2 seconds.   Neurological:      Mental Status: He is alert. Mental status is at baseline.      Gait: Gait abnormal.   Psychiatric:         Mood and Affect: Mood normal.         999793 BMP abnormals only BUN 35, creatinine 1.3 (down trended from 44/1.5 716977), BUN/creatinine ratio 27, calcium 7 6 (stable and chronic for patient)    Last CBC [id number] abnormals only RBC 3.5, H&H 10.4/31.8, prior 11.1/33.1 on 022624    Assessment/Plan  1. Anasarca  - torsemide alone w poor effects  - will try 2.5 mg metolazone prior to AM dose of torsemide 3/6  -   2. Urinary retention  - sherwood in place, plan for removal on 3/7 prior to uro appt     3. Acute kidney injury superimposed on CKD (CMS/HCC)  - stable renal function, creat 1.3 (1.5 prior) and GFR up to 53 (45 prior)  - labs on 030824    4. Physical deconditioning  - PT and OT  -Therapy has been hampered by inability to wear shoes due to the presence of Tubigrip making his feet too big for his current shoes, will try without Tubigrip tomorrow morning    5. Heart failure with preserved ejection fraction, unspecified HF chronicity (CMS/HCC)  -Lung sounds are clear no signs of pulmonary edema despite lower extremity edema    6. Atrial fibrillation, unspecified type (CMS/HCC)  -Continues on apixaban no signs of bleeding    7. Parkinson's disease without dyskinesia or fluctuating manifestations  -Continues on Sinemet    8. Other superficial bite of lip, initial encounter  -Patient has history of biting his lip due to Parkinson's disease, will use emollient, monitor    9. Hypothyroidism  - will need repeat labs on or around 040424 post med change    10. Neuropathy and chronic LBP  - tramadol prn   - tylenol     Med review  - reviewed in PCC  - tramadol renewed     Code  status  [place] code status: FULL    Dispo: will try adding metolazone to diuretic therapy.Only 1 dose ordered for now and can re eval on 6/7.  Labs due next on 3/8.     ANEESH Morillo  03/05/24      Electronically Signed By: ANEESH Morillo   3/5/24  2:19 PM

## 2024-03-05 NOTE — PROGRESS NOTES
FUV     HISTORY OF PRESENT ILLNESS:   Brady Ortega is a 78 y.o. male who is being seen today for urinary retention, sherwood catheter.    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    Hordeolum internum left lower eyelid 12/04/2015    Hordeolum internum of left lower eyelid    Noninfective gastroenteritis and colitis, unspecified     Colitis    Other bursitis of knee, unspecified knee     Knee bursitis    Personal history of other diseases of the respiratory system     History of asthma    Personal history of other endocrine, nutritional and metabolic disease     History of hyperlipidemia       PAST SURGICAL HISTORY:  Past Surgical History:   Procedure Laterality Date    BACK SURGERY  11/15/2013    Back Surgery    HERNIA REPAIR  11/15/2013    Hernia Repair    LUNG LOBECTOMY  01/13/2017    Lung Lobectomy    OTHER SURGICAL HISTORY  01/13/2017    Wedge Resection Of Lung    TOTAL HIP ARTHROPLASTY  11/15/2013    Hip Replacement    VASECTOMY  11/15/2013    Surgery Vas Deferens Vasectomy        ALLERGIES:   Allergies   Allergen Reactions    Bactrim [Sulfamethoxazole-Trimethoprim] Unknown        MEDICATIONS:   Current Outpatient Medications   Medication Instructions    acetaminophen (TYLENOL) 1,000 mg, oral, 2 times daily    apixaban (ELIQUIS) 2.5 mg, oral, 2 times daily    atorvastatin (LIPITOR) 20 mg, oral, Daily    azelastine (Astelin) 137 mcg (0.1 %) nasal spray 2 sprays, Each Nostril, 2 times daily, Use in each nostril as directed    carbidopa-levodopa (Sinemet)  mg tablet 1 tablet, oral, As directed    cholecalciferol (VITAMIN D-3) 25 mcg, oral, Daily    cinacalcet (SENSIPAR) 30 mg, oral, Daily, Take with food or shortly afer a meal. Swallow tablet whole; do not break or divide.    coenzyme Q-10 200  "mg, oral, Daily RT    diclofenac sodium (VOLTAREN) 4 g, Topical, 2 times daily PRN, To lower back     famotidine (Pepcid) 20 mg tablet oral, Nightly    gabapentin (NEURONTIN) 100 mg, oral, Nightly    gabapentin (NEURONTIN) 100 mg, oral, Daily PRN    glucosamine-chondroit-vit C-Mn 500-400 mg capsule 2 capsules, oral, Daily    ipratropium (Atrovent) 21 mcg (0.03 %) nasal spray 2 sprays, Each Nostril, 2 times daily    levothyroxine (SYNTHROID, LEVOXYL) 88 mcg, oral, Daily before breakfast    methocarbamol (ROBAXIN) 500 mg, oral, 2 times daily PRN    multivitamin tablet 1 tablet, oral, Daily    olopatadine (Patanol) 0.1 % ophthalmic solution 1 drop, Both Eyes, 2 times daily    terazosin (HYTRIN) 5 mg, oral, Nightly PRN    torsemide (Demadex) 20 mg tablet Daily, 20mg  alternating with 40 mg     traMADol (ULTRAM) 50 mg, oral, Every 12 hours PRN      PHYSICAL EXAM:  There were no vitals taken for this visit.  Constitutional: Patient appears well-developed and well-nourished. No distress.    Pulmonary/Chest: Effort normal. No respiratory distress.   Abdominal: Soft, ND NT  : WNL  Musculoskeletal: Normal range of motion.    Neurological: Alert and oriented to person, place, and time.  Psychiatric: Normal mood and affect. Behavior is normal. Thought content normal.      Labs:  No results found for: \"TESTOSTERONE\"  No results found for: \"PSA\"  No components found for: \"CBC\"  No results found for: \"CREATININE\"  No components found for: \"TESTOTMS\"  No results found for: \"TESTF\"    Discussion:  Pt was recently hospitalized on 2/16/23 for cellulitis and sepsis also with acute kidney injury. He was placed on Tamsulosin recently and I explained it can take a few weeks for medication to fully begin to working. Denny was removed this morning and was unable to urinate even with an urge. Pt has been straight cath twice. He was able to urinate prior to leaving the hospital but now he is unable to do so. Pt declined placing a new " catheter. Also, offered low dose Cialis 5mg to help with urination. Will try daily dosing Cialis. Pt denies nitrates, explained. Will set follow up for Dr. Berry in 2 months, pt agrees with plan.      Assessment:    No diagnosis found.    Brady Ortega is a 78 y.o. male here for FUV     Plan:   1) Will send rx for daily dosing Cialis 5mg to patients pharmacy and to take it in the morning. I encouraged the patient to continue taking Tamsulosin at night as well.   2) Will send referral to Dr. Berry to discuss minimally invasive procedures.   3) All questions and concerns were addressed. Patient verbalizes understanding and has no other questions at this time.     Scribe Attestation  By signing my name below, I, Aaliyah Greer, Jigar   attest that this documentation has been prepared under the direction and in the presence of Trace Hankins MD.

## 2024-03-05 NOTE — PROGRESS NOTES
Reason for visit: edema    Subjective   HPI:: Mr. Ortega 78 y.o. year old male was admitted to [facility] Skilled Nursing unit on 2/22/24. Patient is seen for the initial SNF visit on 2/23/24. 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     Admitted from: acute care hospital  Facility: [facility]  Dates of hospitalization: 2/16-2/22/24  Hospital Course:  Admitted for low blood pressures and went to the ICU for severe sepsis. Had BEKA with ATN on ckd3b. Seen by nephrology and cardiology. Propranolol stopped due to bradycardia. Kidney function improved. Gabapentin dose reduced. Treated for cellulitis of R leg. Had elevated troponin on admission. Also had hyponatremia, hypervolemia, anemia, long standing persistent afib. D/bri to [facility] 2/22 for rehab on renal diet, 2 gram sodium restriction and 1.2L fluid restriction.       Primary caregiver: patient and wife  : wife: becca Ortega Office:  [phone]   Home:  [phone]     PCP: ashwini carpenter- [facility] [place]    Seen today in room for follow-up edema and lower lip lesion, daughter says patient occasionally bites his lip ; will use emollient and or Chapstick until healed. Re: edema, pt on torsemide, got 40 mg extra dose last evening but gained overnight. Family concerned that tubigrips are more uncomfortable for patient because he cannot wear his shoes due to the extra layers and concerns that fluid is being compressed and ending up more edematous in the thigh areas.  They would like to try holding the Tubigrip in the morning prior to therapy to see if he can get his shoes on so he can participate better in therapy.  Agreeable.  Discussion with Dr. Ramsey regarding  efficacy of torsemide, will try adding Bumex prior to dose.  No other new complaints from patient.    Review of Systems   Constitutional:  Positive for activity change (Having more difficulty participating in therapy due to inability to put his shoes on). Negative for appetite change and unexpected weight change.   Respiratory:  Negative for cough, shortness of breath and wheezing.    Cardiovascular:  Positive for leg swelling. Negative for chest pain and palpitations.   Gastrointestinal:  Negative for abdominal distention, abdominal pain and constipation.   Genitourinary:  Positive for difficulty urinating (sherwood).   Musculoskeletal:  Positive for gait problem.   Neurological:  Positive for weakness.       Objective   VS: reviewed in point click care BP: 119/61 mmHg  3/5/2024 09:09    Temp:96.3 °F  3/5/2024 09:09  Pulse:71 bpm  3/5/2024 09:09    Weight:151.6 Lbs ###wts are increasing despite torsemide   3/5/2024 13:12  Resp:14 Breaths/min  3/5/2024 09:09  BS:  O2:98 %  3/5/2024 09:09  Pain:2  3/5/2024 08:37    Physical Exam  Vitals reviewed.   Constitutional:       General: He is not in acute distress.  HENT:      Head: Normocephalic and atraumatic.      Nose: Nose normal.      Mouth/Throat:      Mouth: Mucous membranes are moist.      Pharynx: Oropharynx is clear.   Eyes:      Conjunctiva/sclera: Conjunctivae normal.   Cardiovascular:      Rate and Rhythm: Normal rate and regular rhythm.      Heart sounds: Normal heart sounds. No murmur heard.  Pulmonary:      Effort: Pulmonary effort is normal.      Breath sounds: Normal breath sounds.   Abdominal:      General: Bowel sounds are normal.      Palpations: Abdomen is soft.   Genitourinary:     Comments: Sherwood in place draining yellow urine  Musculoskeletal:         General: Normal range of motion.      Cervical back: Normal range of motion and neck supple.      Right lower leg: Edema present.      Left lower leg: Edema present.      Comments: +3 pitting edema lower  extremities all the way up to the hips and scrotum.   Skin:     General: Skin is warm and dry.      Capillary Refill: Capillary refill takes less than 2 seconds.   Neurological:      Mental Status: He is alert. Mental status is at baseline.      Gait: Gait abnormal.   Psychiatric:         Mood and Affect: Mood normal.         790690 BMP abnormals only BUN 35, creatinine 1.3 (down trended from 44/1.5 279341), BUN/creatinine ratio 27, calcium 7 6 (stable and chronic for patient)    Last CBC 937130 abnormals only RBC 3.5, H&H 10.4/31.8, prior 11.1/33.1 on 022624    Assessment/Plan   1. Anasarca  - torsemide alone w poor effects  - will try 2.5 mg metolazone prior to AM dose of torsemide 3/6  -   2. Urinary retention  - sherwood in place, plan for removal on 3/7 prior to uro appt     3. Acute kidney injury superimposed on CKD (CMS/HCC)  - stable renal function, creat 1.3 (1.5 prior) and GFR up to 53 (45 prior)  - labs on 664095    4. Physical deconditioning  - PT and OT  -Therapy has been hampered by inability to wear shoes due to the presence of Tubigrip making his feet too big for his current shoes, will try without Tubigrip tomorrow morning    5. Heart failure with preserved ejection fraction, unspecified HF chronicity (CMS/HCC)  -Lung sounds are clear no signs of pulmonary edema despite lower extremity edema    6. Atrial fibrillation, unspecified type (CMS/HCC)  -Continues on apixaban no signs of bleeding    7. Parkinson's disease without dyskinesia or fluctuating manifestations  -Continues on Sinemet    8. Other superficial bite of lip, initial encounter  -Patient has history of biting his lip due to Parkinson's disease, will use emollient, monitor    9. Hypothyroidism  - will need repeat labs on or around 040424 post med change    10. Neuropathy and chronic LBP  - tramadol prn   - tylenol     Med review  - reviewed in PCC  - tramadol renewed     Code status  [place] code status: FULL    Dispo: will try adding metolazone  to diuretic therapy.Only 1 dose ordered for now and can re eval on 6/7.  Labs due next on 3/8.     Luisana Peoples, XI-CNP  03/05/24

## 2024-03-06 ENCOUNTER — HOSPITAL ENCOUNTER (OUTPATIENT)
Dept: RADIOLOGY | Facility: HOSPITAL | Age: 79
Discharge: HOME | End: 2024-03-06
Payer: COMMERCIAL

## 2024-03-06 ENCOUNTER — OFFICE VISIT (OUTPATIENT)
Dept: SURGERY | Facility: HOSPITAL | Age: 79
End: 2024-03-06
Payer: COMMERCIAL

## 2024-03-06 VITALS
SYSTOLIC BLOOD PRESSURE: 96 MMHG | TEMPERATURE: 98.2 F | HEART RATE: 66 BPM | RESPIRATION RATE: 18 BRPM | OXYGEN SATURATION: 96 % | DIASTOLIC BLOOD PRESSURE: 46 MMHG

## 2024-03-06 DIAGNOSIS — C34.90 MALIGNANT NEOPLASM OF LUNG, UNSPECIFIED LATERALITY, UNSPECIFIED PART OF LUNG (MULTI): ICD-10-CM

## 2024-03-06 DIAGNOSIS — C34.90 MALIGNANT NEOPLASM OF UNSPECIFIED PART OF UNSPECIFIED BRONCHUS OR LUNG (MULTI): ICD-10-CM

## 2024-03-06 PROCEDURE — 71250 CT THORAX DX C-: CPT

## 2024-03-06 PROCEDURE — 71250 CT THORAX DX C-: CPT | Performed by: RADIOLOGY

## 2024-03-06 PROCEDURE — 99214 OFFICE O/P EST MOD 30 MIN: CPT | Performed by: THORACIC SURGERY (CARDIOTHORACIC VASCULAR SURGERY)

## 2024-03-06 PROCEDURE — 1126F AMNT PAIN NOTED NONE PRSNT: CPT | Performed by: THORACIC SURGERY (CARDIOTHORACIC VASCULAR SURGERY)

## 2024-03-06 PROCEDURE — 1036F TOBACCO NON-USER: CPT | Performed by: THORACIC SURGERY (CARDIOTHORACIC VASCULAR SURGERY)

## 2024-03-06 PROCEDURE — 1157F ADVNC CARE PLAN IN RCRD: CPT | Performed by: THORACIC SURGERY (CARDIOTHORACIC VASCULAR SURGERY)

## 2024-03-06 PROCEDURE — 1159F MED LIST DOCD IN RCRD: CPT | Performed by: THORACIC SURGERY (CARDIOTHORACIC VASCULAR SURGERY)

## 2024-03-06 ASSESSMENT — ENCOUNTER SYMPTOMS
LOSS OF SENSATION IN FEET: 1
OCCASIONAL FEELINGS OF UNSTEADINESS: 1
DEPRESSION: 0

## 2024-03-06 ASSESSMENT — PAIN SCALES - GENERAL: PAINLEVEL: 0-NO PAIN

## 2024-03-06 ASSESSMENT — PATIENT HEALTH QUESTIONNAIRE - PHQ9
SUM OF ALL RESPONSES TO PHQ9 QUESTIONS 1 AND 2: 0
1. LITTLE INTEREST OR PLEASURE IN DOING THINGS: NOT AT ALL
2. FEELING DOWN, DEPRESSED OR HOPELESS: NOT AT ALL

## 2024-03-06 NOTE — PROGRESS NOTES
Mr. Ortega underwent a VATS left lower lobectomy on 1/5/17 for a small [id number] adenocarcinoma of the lung. Since the last visit there have been no changes to the past medical history, past surgical history, social history, family history, or review of systems. He is being treated for Parkinsons with medication.  He was admitted to the [facility] last month with cellulitis and sepsis and was there for many days.  He did have acute kidney injury at that time.  Otherwise since the last visit there have been no changes to the past medical history, past surgical history, social history, family history, or review of systems.    I viewed the current CT scan and compared it to the prior.  There are no new lung nodules or adenopathy.  There is no evidence of recurrence.    Mr. Ortega is without evidence of recurrence.  I will see him back with a telehealth visit in 1 year

## 2024-03-07 ENCOUNTER — OFFICE VISIT (OUTPATIENT)
Dept: UROLOGY | Facility: HOSPITAL | Age: 79
End: 2024-03-07
Payer: COMMERCIAL

## 2024-03-07 DIAGNOSIS — R33.9 URINARY RETENTION: ICD-10-CM

## 2024-03-07 PROCEDURE — 1159F MED LIST DOCD IN RCRD: CPT | Performed by: UROLOGY

## 2024-03-07 PROCEDURE — 1157F ADVNC CARE PLAN IN RCRD: CPT | Performed by: UROLOGY

## 2024-03-07 PROCEDURE — 1036F TOBACCO NON-USER: CPT | Performed by: UROLOGY

## 2024-03-07 PROCEDURE — 1126F AMNT PAIN NOTED NONE PRSNT: CPT | Performed by: UROLOGY

## 2024-03-07 PROCEDURE — 99214 OFFICE O/P EST MOD 30 MIN: CPT | Performed by: UROLOGY

## 2024-03-07 RX ORDER — TADALAFIL 5 MG/1
5 TABLET ORAL DAILY
Qty: 90 TABLET | Refills: 3 | Status: SHIPPED | OUTPATIENT
Start: 2024-03-07 | End: 2024-03-25 | Stop reason: HOSPADM

## 2024-03-12 ENCOUNTER — APPOINTMENT (OUTPATIENT)
Dept: ORTHOPEDIC SURGERY | Facility: CLINIC | Age: 79
End: 2024-03-12
Payer: COMMERCIAL

## 2024-03-12 ENCOUNTER — NURSING HOME VISIT (OUTPATIENT)
Dept: POST ACUTE CARE | Facility: EXTERNAL LOCATION | Age: 79
End: 2024-03-12

## 2024-03-12 DIAGNOSIS — E87.1 HYPONATREMIA: ICD-10-CM

## 2024-03-12 DIAGNOSIS — R60.1 ANASARCA: Primary | ICD-10-CM

## 2024-03-12 DIAGNOSIS — I50.30 HEART FAILURE WITH PRESERVED EJECTION FRACTION, UNSPECIFIED HF CHRONICITY (MULTI): ICD-10-CM

## 2024-03-12 DIAGNOSIS — E03.9 HYPOTHYROIDISM, UNSPECIFIED TYPE: ICD-10-CM

## 2024-03-12 DIAGNOSIS — S00.571A OTHER SUPERFICIAL BITE OF LIP, INITIAL ENCOUNTER: ICD-10-CM

## 2024-03-12 DIAGNOSIS — R53.81 PHYSICAL DECONDITIONING: ICD-10-CM

## 2024-03-12 DIAGNOSIS — G20.A1 PARKINSON'S DISEASE WITHOUT DYSKINESIA OR FLUCTUATING MANIFESTATIONS (MULTI): ICD-10-CM

## 2024-03-12 DIAGNOSIS — N18.9 ACUTE KIDNEY INJURY SUPERIMPOSED ON CKD (CMS-HCC): ICD-10-CM

## 2024-03-12 DIAGNOSIS — I48.91 ATRIAL FIBRILLATION, UNSPECIFIED TYPE (MULTI): ICD-10-CM

## 2024-03-12 DIAGNOSIS — M54.9 CHRONIC BACK PAIN, UNSPECIFIED BACK LOCATION, UNSPECIFIED BACK PAIN LATERALITY: ICD-10-CM

## 2024-03-12 DIAGNOSIS — G89.29 CHRONIC BACK PAIN, UNSPECIFIED BACK LOCATION, UNSPECIFIED BACK PAIN LATERALITY: ICD-10-CM

## 2024-03-12 DIAGNOSIS — N17.9 ACUTE KIDNEY INJURY SUPERIMPOSED ON CKD (CMS-HCC): ICD-10-CM

## 2024-03-12 DIAGNOSIS — E21.3 HYPERPARATHYROIDISM (MULTI): ICD-10-CM

## 2024-03-12 DIAGNOSIS — R33.9 URINARY RETENTION: ICD-10-CM

## 2024-03-12 PROCEDURE — 99310 SBSQ NF CARE HIGH MDM 45: CPT | Performed by: NURSE PRACTITIONER

## 2024-03-12 RX ORDER — METOLAZONE 2.5 MG/1
2.5 TABLET ORAL EVERY OTHER DAY
Qty: 15 TABLET | Refills: 0 | Status: SHIPPED | OUTPATIENT
Start: 2024-03-12 | End: 2024-03-25 | Stop reason: HOSPADM

## 2024-03-12 RX ORDER — TRAMADOL HYDROCHLORIDE 50 MG/1
50 TABLET ORAL EVERY 12 HOURS PRN
Qty: 10 TABLET | Refills: 0 | Status: SHIPPED | OUTPATIENT
Start: 2024-03-12 | End: 2024-03-25 | Stop reason: HOSPADM

## 2024-03-12 ASSESSMENT — ENCOUNTER SYMPTOMS
DIARRHEA: 0
WEAKNESS: 1
BACK PAIN: 0
LIGHT-HEADEDNESS: 0
SHORTNESS OF BREATH: 0
PALPITATIONS: 0
CONSTIPATION: 0
WHEEZING: 0
ARTHRALGIAS: 0
UNEXPECTED WEIGHT CHANGE: 0
DIFFICULTY URINATING: 1
ABDOMINAL PAIN: 0
NERVOUS/ANXIOUS: 1
ABDOMINAL DISTENTION: 0
APPETITE CHANGE: 0

## 2024-03-12 NOTE — LETTER
Patient: Brady Ortega  : 1945    Encounter Date: 2024    Service Date: 24  Admission Date: 222  Discharge Date: 313    Level of Care: Post-acute skilled care  SNF Attending: Marilee Ramsey MD  Skilled unit resident care manager phone: [phone]  Skilled unit Fax: [phone]    Primary Care Physician: NELL Crane MD    Hospital Course:   Admitted from: acute care hospital  Facility: [facility]  Dates of hospitalization: -24  Hospital Course:  Admitted for low blood pressures and went to the ICU for severe sepsis. Had BEKA with ATN on ckd3b. Seen by nephrology and cardiology. Propranolol stopped due to bradycardia. Kidney function improved. Gabapentin dose reduced. Treated for cellulitis of R leg. Had elevated troponin on admission. Also had hyponatremia, hypervolemia, anemia, long standing persistent afib. D/bri to [facility]  for rehab on renal diet, 2 gram sodium restriction and 1.2L fluid restriction.     Subacute Course:   - continues with anasarca, was put on metolazone 2.5 mg every other day by his cardio Dr Luevano along with torsemide 20/40 mg alternating dose, still have wt gains as of 312.   - seen by urology [id number] and sherwood removed, continues to have high residuals and needs ICS.   - renal function waxes and wanes with current uptrend in Cr and BUN and downtrend in GFR  - participated with therapy, will refer to home at dc  - lip bite healing  - nos sx of bleeding on apixaban, PD is stable     Tests/Procedures:  Lab work 527090 abnormals only sodium 135, BUN 47, creatinine 1.9 (prior 3/8/2024 37/1.3), BUN/creatinine ratio 25, GFR 34 (prior 3/8/2024 53).  Plan CBC 056368 H&H 10.3/31.3 K work on the review of systems and physical exam lymphocytopenia with contact about well.        Functional Status at Discharge: assist IADLs and ADLs, mobile with WC, assist for walker     Seen on unit today for dc planning. No new complaints, but continues to be concerned  about edema and ICS needs.  Spoke with wife who is concerned about abrupt dc from insurance which is going to [facility] [employer] peer on [id number]. None of them know how to straight cath and she still has to order equipment. 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   Eyes:  Negative for visual disturbance.   Respiratory:  Negative for shortness of breath and wheezing.    Cardiovascular:  Positive for leg swelling. Negative for chest pain and palpitations.   Gastrointestinal:  Negative for abdominal distention, abdominal pain, constipation and diarrhea.   Genitourinary:  Positive for difficulty urinating.   Musculoskeletal:  Positive for gait problem. Negative for arthralgias and back pain.   Skin:         Gouty tophi on hands   Neurological:  Positive for weakness. Negative for light-headedness.   Psychiatric/Behavioral:  The patient is nervous/anxious.         Discharge Physical Exam:   Vitals: reviewed in [facility]  Physical Exam  Vitals reviewed.   Constitutional:       General: He is not in acute distress.  HENT:      Head: Normocephalic and atraumatic.      Nose: Nose normal.      Mouth/Throat:      Mouth: Mucous membranes are moist.      Pharynx: Oropharynx is clear.   Eyes:      Conjunctiva/sclera: Conjunctivae normal.   Cardiovascular:      Rate and Rhythm: Normal rate and regular rhythm.      Pulses: Normal pulses.      Heart sounds: Normal heart sounds. No murmur heard.     No friction rub. No gallop.   Pulmonary:      Effort: Pulmonary effort is normal.      Breath sounds: Normal breath sounds.   Abdominal:      General: Abdomen is flat. Bowel sounds are normal.      Palpations: Abdomen is soft.   Musculoskeletal:         General: Deformity (fingers, susan dips) present.      Cervical back: Normal range of motion and neck supple.   Skin:     General: Skin is warm and dry.      Capillary Refill: Capillary  refill takes less than 2 seconds.   Neurological:      Mental Status: He is alert and oriented to person, place, and time.      Comments: No tremors noted today   Psychiatric:         Mood and Affect: Mood normal.        Assessment and plan:  1. Anasarca  - torsemide, metolazone  - compression to legs, elevation    2. Urinary retention  - continues to have retention and will need ICS at home if dc  - consider checking with urology re: sherwood replacement if unable to learn ICS  - cialis and tamsulosin    3. Acute kidney injury superimposed on CKD (CMS/HCC)  -bump upward as of 940542, GFR decreased     4. Physical deconditioning  - PT and OT     5. Heart failure with preserved ejection fraction, unspecified HF chronicity (CMS/HCC)  6. Atrial fibrillation, unspecified type (CMS/HCC)  - stable no sx of decompensation   - no sx of bleeding     7. Parkinson's disease without dyskinesia or fluctuating manifestations  - stable on sinemet    8. Other superficial bite of lip, initial encounter  - slowly healing     9. Hyponatremia  - 135 as of 247423, monitor    10. Hypothyroidism, unspecified type  - stable     11. Hyperparathyroidism (CMS/HCC)  - stable      Discharge Condition: edema and retention persist   Discharge Disposition:  home   Discharge Meds: SEE ATTACHED LIST      Discharge Instructions:  Transitions of Care Critical Issues: needs fu on renal/edema/retention  Imaging follow-up: none  Lab Monitoring Needed: BMP within 1 week to monitor renal   Specialists Follow-Up Needed:  cardio, renal uro    Future Appointments: consult [facility]        Electronically Signed By: ANEESH Morillo   3/12/24  3:02 PM

## 2024-03-12 NOTE — PROGRESS NOTES
Service Date: 03/12/24  Admission Date: 022224  Discharge Date: 031324    Level of Care: Post-acute skilled care  SNF Attending: Marilee Ramsey MD  Skilled unit resident care manager phone: [phone]  Skilled unit Fax: [phone]    Primary Care Physician: NELL Crane MD    Hospital Course:   Admitted from: acute care hospital  Facility: [facility]  Dates of hospitalization: 2/16-2/22/24  Hospital Course:  Admitted for low blood pressures and went to the ICU for severe sepsis. Had BEKA with ATN on ckd3b. Seen by nephrology and cardiology. Propranolol stopped due to bradycardia. Kidney function improved. Gabapentin dose reduced. Treated for cellulitis of R leg. Had elevated troponin on admission. Also had hyponatremia, hypervolemia, anemia, long standing persistent afib. D/bri to [facility] 2/22 for rehab on renal diet, 2 gram sodium restriction and 1.2L fluid restriction.     Subacute Course:   - continues with anasarca, was put on metolazone 2.5 mg every other day by his cardio Dr Luevano along with torsemide 20/40 mg alternating dose, still have wt gains as of 031224.   - seen by urology [id number] and sherwood removed, continues to have high residuals and needs ICS.   - renal function waxes and wanes with current uptrend in Cr and BUN and downtrend in GFR  - participated with therapy, will refer to home at dc  - lip bite healing  - nos sx of bleeding on apixaban, PD is stable     Tests/Procedures:  Lab work 335050 abnormals only sodium 135, BUN 47, creatinine 1.9 (prior 3/8/2024 37/1.3), BUN/creatinine ratio 25, GFR 34 (prior 3/8/2024 53).  Plan CBC 426758 H&H 10.3/31.3 K work on the review of systems and physical exam lymphocytopenia with contact about well.        Functional Status at Discharge: assist IADLs and ADLs, mobile with WC, assist for walker     Seen on unit today for dc planning. No new complaints, but continues to be concerned about edema and ICS needs.  Spoke with wife who is concerned about abrupt dc  from insurance which is going to peer to peer on [id number]. None of them know how to straight cath and she still has to order equipment. 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   Eyes:  Negative for visual disturbance.   Respiratory:  Negative for shortness of breath and wheezing.    Cardiovascular:  Positive for leg swelling. Negative for chest pain and palpitations.   Gastrointestinal:  Negative for abdominal distention, abdominal pain, constipation and diarrhea.   Genitourinary:  Positive for difficulty urinating.   Musculoskeletal:  Positive for gait problem. Negative for arthralgias and back pain.   Skin:         Gouty tophi on hands   Neurological:  Positive for weakness. Negative for light-headedness.   Psychiatric/Behavioral:  The patient is nervous/anxious.         Discharge Physical Exam:   Vitals: reviewed in [facility]  Physical Exam  Vitals reviewed.   Constitutional:       General: He is not in acute distress.  HENT:      Head: Normocephalic and atraumatic.      Nose: Nose normal.      Mouth/Throat:      Mouth: Mucous membranes are moist.      Pharynx: Oropharynx is clear.   Eyes:      Conjunctiva/sclera: Conjunctivae normal.   Cardiovascular:      Rate and Rhythm: Normal rate and regular rhythm.      Pulses: Normal pulses.      Heart sounds: Normal heart sounds. No murmur heard.     No friction rub. No gallop.   Pulmonary:      Effort: Pulmonary effort is normal.      Breath sounds: Normal breath sounds.   Abdominal:      General: Abdomen is flat. Bowel sounds are normal.      Palpations: Abdomen is soft.   Musculoskeletal:         General: Deformity (fingers, susan dips) present.      Cervical back: Normal range of motion and neck supple.   Skin:     General: Skin is warm and dry.      Capillary Refill: Capillary refill takes less than 2 seconds.   Neurological:      Mental Status: He is alert  and oriented to person, place, and time.      Comments: No tremors noted today   Psychiatric:         Mood and Affect: Mood normal.        Assessment and plan:  1. Anasarca  - torsemide, metolazone  - compression to legs, elevation    2. Urinary retention  - continues to have retention and will need ICS at home if dc  - consider checking with urology re: sherwood replacement if unable to learn ICS  - cialis and tamsulosin    3. Acute kidney injury superimposed on CKD (CMS/HCC)  -bump upward as of 009173, GFR decreased     4. Physical deconditioning  - PT and OT     5. Heart failure with preserved ejection fraction, unspecified HF chronicity (CMS/HCC)  6. Atrial fibrillation, unspecified type (CMS/HCC)  - stable no sx of decompensation   - no sx of bleeding     7. Parkinson's disease without dyskinesia or fluctuating manifestations  - stable on sinemet    8. Other superficial bite of lip, initial encounter  - slowly healing     9. Hyponatremia  - 135 as of 841683, monitor    10. Hypothyroidism, unspecified type  - stable     11. Hyperparathyroidism (CMS/HCC)  - stable      Discharge Condition: edema and retention persist   Discharge Disposition:  home   Discharge Meds: SEE ATTACHED LIST      Discharge Instructions:  Transitions of Care Critical Issues: needs fu on renal/edema/retention  Imaging follow-up: none  Lab Monitoring Needed: BMP within 1 week to monitor renal   Specialists Follow-Up Needed:  cardio, renal uro    Future Appointments: consult [facility]

## 2024-03-14 ENCOUNTER — NURSING HOME VISIT (OUTPATIENT)
Dept: POST ACUTE CARE | Facility: EXTERNAL LOCATION | Age: 79
End: 2024-03-14
Payer: COMMERCIAL

## 2024-03-14 DIAGNOSIS — R33.9 URINE RETENTION: Primary | ICD-10-CM

## 2024-03-14 DIAGNOSIS — Z75.8 DISCHARGE PLANNING ISSUES: ICD-10-CM

## 2024-03-14 DIAGNOSIS — R60.1 ANASARCA: ICD-10-CM

## 2024-03-14 PROCEDURE — 99316 NF DSCHRG MGMT 30 MIN+: CPT | Performed by: NURSE PRACTITIONER

## 2024-03-14 ASSESSMENT — ENCOUNTER SYMPTOMS
DIFFICULTY URINATING: 1
LIGHT-HEADEDNESS: 0
SHORTNESS OF BREATH: 0

## 2024-03-14 NOTE — LETTER
Patient: Brady Ortega  : 1945    Encounter Date: 2024    Reason for visit: Urine retention    Subjective  HPI:Admitted for low blood pressures and went to the ICU for severe sepsis. Had BEKA with ATN on ckd3b. Seen by nephrology and cardiology. Propranolol stopped due to bradycardia. Kidney function improved. Gabapentin dose reduced. Treated for cellulitis of R leg. Had elevated troponin on admission. Also had hyponatremia, hypervolemia, anemia, long standing persistent afib. D/bri to [facility]  for rehab on renal diet, 2 gram sodium restriction and 1.2L fluid restriction.   Seen today for urine retention and discharge planning issues related to urine retention as well as edema.  Egoz-ba-fiky done by MD yesterday and approval given for extended stay through 3/15/2024.  Patient has been requiring intermittent straight cath 3 out of 4 days for a.m. urine output.  His retention has been up to 900 mL with output of over 1000 with straight cath.  Discussed with patient, he would prefer not to have Denny reinserted and says that he is willing to learn straight cath procedure. Also discussed the potential stand when he is trying to void to eliminate more urine, but he cannot do this alone. Discussed with nursing will attempt to teach patient intermittent straight cath on twice daily basis.  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  I also asked nursing to ensure that home care can see patient within 24 hours postdischarge to assess  status.  Weight status unconfirmed yet today.        Review of Systems   Respiratory:  Negative for shortness of breath.    Cardiovascular:  Positive for leg swelling. Negative for chest pain.   Genitourinary:  Positive for difficulty urinating.   Neurological:  Negative for light-headedness.       Objective  VS: reviewed in point click care Current Vitals   BP: 123/71 mmHg  3/14/2024 10:28    Temp:97.2  °F  3/14/2024 10:28    Pulse:56 bpm  3/14/2024 10:28    Weight:152.4 Lbs  3/13/2024 14:10  Resp:18 Breaths/min  3/14/2024 10:28  BS:  O2:94 %  3/14/2024 10:28  Pain:0  3/14/2024 12:11    Physical Exam  Constitutional:       General: He is not in acute distress.  Pulmonary:      Effort: Pulmonary effort is normal.   Abdominal:      General: Abdomen is flat.   Musculoskeletal:         General: Deformity (Hands bilaterally) present.      Right lower leg: Edema present.      Left lower leg: Edema present.   Skin:     General: Skin is warm and dry.   Neurological:      Mental Status: He is alert. Mental status is at baseline.       Lab work [id number] abnormals only sodium 135, BUN 47, creatinine 1.9 (prior 3/8/2024 37/1.3), BUN/creatinine ratio 25, GFR 34 (prior 3/8/2024 53).    Assessment/Plan    1. Urine retention  - plan bid straight caths for home, nursing to instruct     2. Anasarca  - wt status yet undetermined today, will fu with nursing    3.discharge planning issues  - Will need to be instructed on straight cath, request for home care to see patient within 24 hours to assess  status     med review  - reviewed in [facility]    Code status  [place] code status: FULL    Dispo: Patient will need to go home on intermittent straight caths twice daily and will need instruction by nursing.    ANEESH Morillo  03/14/24      Electronically Signed By: ANEESH Morillo   3/14/24  1:09 PM

## 2024-03-14 NOTE — PROGRESS NOTES
Reason for visit: Urine retention    Subjective   HPI:Admitted for low blood pressures and went to the ICU for severe sepsis. Had BEKA with ATN on ckd3b. Seen by nephrology and cardiology. Propranolol stopped due to bradycardia. Kidney function improved. Gabapentin dose reduced. Treated for cellulitis of R leg. Had elevated troponin on admission. Also had hyponatremia, hypervolemia, anemia, long standing persistent afib. D/bri to [facility] 2/22 for rehab on renal diet, 2 gram sodium restriction and 1.2L fluid restriction.   Seen today for urine retention and discharge planning issues related to urine retention as well as edema.  Bokg-eh-gavg done by MD yesterday and approval given for extended stay through 3/15/2024.  Patient has been requiring intermittent straight cath 3 out of 4 days for a.m. urine output.  His retention has been up to 900 mL with output of over 1000 with straight cath.  Discussed with patient, he would prefer not to have Denny reinserted and says that he is willing to learn straight cath procedure. Also discussed the potential stand when he is trying to void to eliminate more urine, but he cannot do this alone. Discussed with nursing will attempt to teach patient intermittent straight cath on twice daily basis.  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  I also asked nursing to ensure that home care can see patient within 24 hours postdischarge to assess  status.  Weight status unconfirmed yet today.        Review of Systems   Respiratory:  Negative for shortness of breath.    Cardiovascular:  Positive for leg swelling. Negative for chest pain.   Genitourinary:  Positive for difficulty urinating.   Neurological:  Negative for light-headedness.       Objective   VS: reviewed in point click care Current Vitals   BP: 123/71 mmHg  3/14/2024 10:28    Temp:97.2 °F  3/14/2024 10:28    Pulse:56 bpm  3/14/2024 10:28    Weight:152.4  Lbs  3/13/2024 14:10  Resp:18 Breaths/min  3/14/2024 10:28  BS:  O2:94 %  3/14/2024 10:28  Pain:0  3/14/2024 12:11    Physical Exam  Constitutional:       General: He is not in acute distress.  Pulmonary:      Effort: Pulmonary effort is normal.   Abdominal:      General: Abdomen is flat.   Musculoskeletal:         General: Deformity (Hands bilaterally) present.      Right lower leg: Edema present.      Left lower leg: Edema present.   Skin:     General: Skin is warm and dry.   Neurological:      Mental Status: He is alert. Mental status is at baseline.       Lab work [id number] abnormals only sodium 135, BUN 47, creatinine 1.9 (prior 3/8/2024 37/1.3), BUN/creatinine ratio 25, GFR 34 (prior 3/8/2024 53).    Assessment/Plan     1. Urine retention  - plan bid straight caths for home, nursing to instruct     2. Anasarca  - wt status yet undetermined today, will fu with nursing    3.discharge planning issues  - Will need to be instructed on straight cath, request for home care to see patient within 24 hours to assess  status     med review  - reviewed in [facility]    Code status  [place] code status: FULL    Dispo: Patient will need to go home on intermittent straight caths twice daily and will need instruction by nursing.    XI Morillo-CNP  03/14/24    Review of labs done at [place]   2/23/24  Glucose 108, na 132, k 4.7, cl 97, bicarb 18, bun 80, creatinine 2.9, gfr 21, calcium 8.3,   Wbc 9.5, hgb 11.9, hematocrit 36.0, mcv 91.5, plts 166    2/26/24  Glucose 78, na 138, k 4.3, cl 100, bicarb 25, bun 68, creatinine 2.0, gfr 32, calcium 7.7, albumin 3.0, phos 3.3  Wbc 7.8, hgb 11.1, hematocrit 33.1, mcv 90.2, plts 186  .5    2/29/24  Glucose 78, na 140, k 4.3, cl 103, bicarb 26, bun 44, creatinine 1.5, gfr 45, calcium 7.6    3/1/24  Glucose 76, na 139, k 4.2, cl 102, bicarb 26, bun 44, creatinine 1.5, gfr 45, calcium 7.6, albumin 3.0, phos 2.6  Wbc 5.4, hgb 10.4, hematocrit 31.8, mcv 90.8, plts 234  BNP  657.7    3/4/24  Glucose 73, na 141, k 4.1, cl 102, bicarb 23, bun 35, creatinine 1.3, gfr 45, calcium 7.6    3/8/24  Glucose 81, na 141, k 4.5, cl 101, bicarb 30, bun 37, creatinine 1.3, gfr 53, calcium 8.3, albumin 3.1, phos 2.9  Wbc 5.4, hgb 10.3, hematocrit 31.3, mcv 90.4, plts 272  .8    3/11/24  Glucose 72, na 135, k 4.7, cl 98, bicarb 24, bun 47, creatinine 1.9, gfr 34, calcium 8.5    3/14/24  Glucose 81, na 142, k 4.3, cl 103, bicarb 25, bun 47, creatinine 1.6, gfr 42, calcium 8.4, albumin 3.2, phos 3.1

## 2024-03-18 ENCOUNTER — TELEPHONE (OUTPATIENT)
Dept: UROLOGY | Facility: HOSPITAL | Age: 79
End: 2024-03-18
Payer: COMMERCIAL

## 2024-03-18 ENCOUNTER — LAB REQUISITION (OUTPATIENT)
Dept: LAB | Facility: LAB | Age: 79
End: 2024-03-18
Payer: COMMERCIAL

## 2024-03-18 DIAGNOSIS — R39.9 LOWER URINARY TRACT SYMPTOMS (LUTS): Primary | ICD-10-CM

## 2024-03-18 DIAGNOSIS — R39.9 UTI SYMPTOMS: ICD-10-CM

## 2024-03-18 DIAGNOSIS — N39.0 URINARY TRACT INFECTION, SITE NOT SPECIFIED: ICD-10-CM

## 2024-03-18 LAB
APPEARANCE UR: ABNORMAL
BILIRUB UR STRIP.AUTO-MCNC: NEGATIVE MG/DL
COLOR UR: YELLOW
GLUCOSE UR STRIP.AUTO-MCNC: NEGATIVE MG/DL
KETONES UR STRIP.AUTO-MCNC: NEGATIVE MG/DL
LEUKOCYTE ESTERASE UR QL STRIP.AUTO: ABNORMAL
NITRITE UR QL STRIP.AUTO: NEGATIVE
PH UR STRIP.AUTO: 5 [PH]
PROT UR STRIP.AUTO-MCNC: ABNORMAL MG/DL
RBC # UR STRIP.AUTO: ABNORMAL /UL
RBC #/AREA URNS AUTO: >20 /HPF
SP GR UR STRIP.AUTO: 1.01
UROBILINOGEN UR STRIP.AUTO-MCNC: <2 MG/DL
WBC #/AREA URNS AUTO: >50 /HPF
WBC CLUMPS #/AREA URNS AUTO: ABNORMAL /HPF

## 2024-03-18 PROCEDURE — 87186 SC STD MICRODIL/AGAR DIL: CPT

## 2024-03-18 PROCEDURE — 87086 URINE CULTURE/COLONY COUNT: CPT

## 2024-03-18 PROCEDURE — 81001 URINALYSIS AUTO W/SCOPE: CPT

## 2024-03-18 RX ORDER — TAMSULOSIN HYDROCHLORIDE 0.4 MG/1
0.4 CAPSULE ORAL DAILY
Qty: 90 CAPSULE | Refills: 2 | Status: CANCELLED | OUTPATIENT
Start: 2024-03-18 | End: 2024-12-13

## 2024-03-18 RX ORDER — TAMSULOSIN HYDROCHLORIDE 0.4 MG/1
0.4 CAPSULE ORAL DAILY
Qty: 90 CAPSULE | Refills: 2 | Status: SHIPPED | OUTPATIENT
Start: 2024-03-18 | End: 2024-12-13

## 2024-03-18 RX ORDER — CIPROFLOXACIN 500 MG/1
500 TABLET ORAL 2 TIMES DAILY
Qty: 10 TABLET | Refills: 0 | Status: SHIPPED | OUTPATIENT
Start: 2024-03-18 | End: 2024-03-25 | Stop reason: HOSPADM

## 2024-03-18 NOTE — TELEPHONE ENCOUNTER
"Returned call to patient's wife. Per wife, the patient urine is \"murky.\" No other symptoms other than him having the shakes this morning, but he also has Parkinson's and hadn't had his medication as of yet. Per wife he is afebrile. Orders for a urine culture placed and home nurse is coming to straight cath the patient to collect a sample. Patient was discharged from [place] on tamsulosin so rx sent to continue. Patient's wife states she has been straight catheterization patient BID as discussed with Dr. Hankins and his output started over 200 but has started to gradually decrease. Patient's wife aware once he consistently has output less than 100 that she can discontinue straight catheterization. Patient's wife to call back after home nurse does assessment to discuss need for antibiotics prior to the culture resulting. Patient's wife verbalized understanding and all questions were answered.    Radha Nicole RN    "

## 2024-03-18 NOTE — TELEPHONE ENCOUNTER
Returned call to patient's wife. Home nurse came and patient was afebrile. He was seeming to improve but since the shakes have returned. Discussed with Dr. Hankins and orders for Cipro BID until the culture results placed. Patient's wife verbalized understanding and all questions were answered.    Radha Nicole RN

## 2024-03-19 ENCOUNTER — APPOINTMENT (OUTPATIENT)
Dept: CARDIOLOGY | Facility: HOSPITAL | Age: 79
DRG: 871 | End: 2024-03-19
Payer: COMMERCIAL

## 2024-03-19 ENCOUNTER — CLINICAL SUPPORT (OUTPATIENT)
Dept: EMERGENCY MEDICINE | Facility: HOSPITAL | Age: 79
DRG: 871 | End: 2024-03-19
Payer: COMMERCIAL

## 2024-03-19 ENCOUNTER — APPOINTMENT (OUTPATIENT)
Dept: RADIOLOGY | Facility: HOSPITAL | Age: 79
DRG: 871 | End: 2024-03-19
Payer: COMMERCIAL

## 2024-03-19 ENCOUNTER — TELEPHONE (OUTPATIENT)
Dept: UROLOGY | Facility: HOSPITAL | Age: 79
End: 2024-03-19
Payer: COMMERCIAL

## 2024-03-19 ENCOUNTER — HOSPITAL ENCOUNTER (INPATIENT)
Facility: HOSPITAL | Age: 79
LOS: 6 days | Discharge: HOME HEALTH | DRG: 871 | End: 2024-03-25
Attending: EMERGENCY MEDICINE | Admitting: INTERNAL MEDICINE
Payer: COMMERCIAL

## 2024-03-19 DIAGNOSIS — Z71.89 GOALS OF CARE, COUNSELING/DISCUSSION: ICD-10-CM

## 2024-03-19 DIAGNOSIS — R65.21: ICD-10-CM

## 2024-03-19 DIAGNOSIS — R33.9 URINE RETENTION: ICD-10-CM

## 2024-03-19 DIAGNOSIS — I50.32 CHRONIC HEART FAILURE WITH PRESERVED EJECTION FRACTION (MULTI): ICD-10-CM

## 2024-03-19 DIAGNOSIS — A41.9 SEPSIS WITH ACUTE ORGAN DYSFUNCTION AND SEPTIC SHOCK, DUE TO UNSPECIFIED ORGANISM, UNSPECIFIED ORGAN DYSFUNCTION TYPE (MULTI): ICD-10-CM

## 2024-03-19 DIAGNOSIS — R57.9 SHOCK (MULTI): Primary | ICD-10-CM

## 2024-03-19 DIAGNOSIS — A41.52: ICD-10-CM

## 2024-03-19 DIAGNOSIS — I50.9 ACUTE ON CHRONIC HEART FAILURE, UNSPECIFIED HEART FAILURE TYPE (MULTI): ICD-10-CM

## 2024-03-19 DIAGNOSIS — E83.39 HYPOPHOSPHATEMIA: ICD-10-CM

## 2024-03-19 DIAGNOSIS — R65.21 SEPSIS WITH ACUTE ORGAN DYSFUNCTION AND SEPTIC SHOCK, DUE TO UNSPECIFIED ORGANISM, UNSPECIFIED ORGAN DYSFUNCTION TYPE (MULTI): ICD-10-CM

## 2024-03-19 DIAGNOSIS — R53.81 PHYSICAL DECONDITIONING: ICD-10-CM

## 2024-03-19 DIAGNOSIS — Z75.8 DISCHARGE PLANNING ISSUES: ICD-10-CM

## 2024-03-19 DIAGNOSIS — R39.9 LOWER URINARY TRACT SYMPTOMS (LUTS): ICD-10-CM

## 2024-03-19 LAB
ALBUMIN SERPL BCP-MCNC: 3 G/DL (ref 3.4–5)
ALP SERPL-CCNC: 104 U/L (ref 33–136)
ALT SERPL W P-5'-P-CCNC: 3 U/L (ref 10–52)
ANION GAP BLDV CALCULATED.4IONS-SCNC: 11 MMOL/L (ref 10–25)
ANION GAP BLDV CALCULATED.4IONS-SCNC: 8 MMOL/L (ref 10–25)
ANION GAP SERPL CALC-SCNC: 17 MMOL/L (ref 10–20)
AORTIC VALVE MEAN GRADIENT: 13 MMHG
AORTIC VALVE PEAK VELOCITY: 2.51 M/S
APTT PPP: 39 SECONDS (ref 27–38)
AST SERPL W P-5'-P-CCNC: 16 U/L (ref 9–39)
AV PEAK GRADIENT: 25.2 MMHG
AVA (PEAK VEL): 1.35 CM2
AVA (VTI): 1.62 CM2
BASE EXCESS BLDV CALC-SCNC: 1.6 MMOL/L (ref -2–3)
BASE EXCESS BLDV CALC-SCNC: 1.7 MMOL/L (ref -2–3)
BASE EXCESS BLDV CALC-SCNC: 1.8 MMOL/L (ref -2–3)
BASOPHILS # BLD AUTO: 0.04 X10*3/UL (ref 0–0.1)
BASOPHILS NFR BLD AUTO: 0.2 %
BILIRUB SERPL-MCNC: 1 MG/DL (ref 0–1.2)
BNP SERPL-MCNC: 552 PG/ML (ref 0–99)
BODY TEMPERATURE: 37 DEGREES CELSIUS
BUN SERPL-MCNC: 55 MG/DL (ref 6–23)
CA-I BLDV-SCNC: 1.07 MMOL/L (ref 1.1–1.33)
CA-I BLDV-SCNC: 1.08 MMOL/L (ref 1.1–1.33)
CALCIUM SERPL-MCNC: 8 MG/DL (ref 8.6–10.6)
CARDIAC TROPONIN I PNL SERPL HS: 234 NG/L (ref 0–53)
CARDIAC TROPONIN I PNL SERPL HS: 237 NG/L (ref 0–53)
CHLORIDE BLDV-SCNC: 98 MMOL/L (ref 98–107)
CHLORIDE BLDV-SCNC: 99 MMOL/L (ref 98–107)
CHLORIDE SERPL-SCNC: 98 MMOL/L (ref 98–107)
CHLORIDE UR-SCNC: <15 MMOL/L
CHLORIDE/CREATININE (MMOL/G) IN URINE: NORMAL
CO2 SERPL-SCNC: 26 MMOL/L (ref 21–32)
CREAT SERPL-MCNC: 2.34 MG/DL (ref 0.5–1.3)
CREAT UR-MCNC: 79 MG/DL (ref 20–370)
EGFRCR SERPLBLD CKD-EPI 2021: 27 ML/MIN/1.73M*2
EJECTION FRACTION APICAL 4 CHAMBER: 35.9
EJECTION FRACTION: 48 %
EOSINOPHIL # BLD AUTO: 0.03 X10*3/UL (ref 0–0.4)
EOSINOPHIL NFR BLD AUTO: 0.1 %
ERYTHROCYTE [DISTWIDTH] IN BLOOD BY AUTOMATED COUNT: 15.3 % (ref 11.5–14.5)
GLOBAL LONGITUDINAL STRAIN: 12.9 %
GLUCOSE BLDV-MCNC: 116 MG/DL (ref 74–99)
GLUCOSE BLDV-MCNC: 138 MG/DL (ref 74–99)
GLUCOSE SERPL-MCNC: 94 MG/DL (ref 74–99)
HCO3 BLDV-SCNC: 25.7 MMOL/L (ref 22–26)
HCO3 BLDV-SCNC: 25.8 MMOL/L (ref 22–26)
HCO3 BLDV-SCNC: 26.6 MMOL/L (ref 22–26)
HCT VFR BLD AUTO: 29.9 % (ref 41–52)
HCT VFR BLD EST: 31 % (ref 41–52)
HCT VFR BLD EST: 31 % (ref 41–52)
HGB BLD-MCNC: 10 G/DL (ref 13.5–17.5)
HGB BLDV-MCNC: 10.3 G/DL (ref 13.5–17.5)
HGB BLDV-MCNC: 10.4 G/DL (ref 13.5–17.5)
IMM GRANULOCYTES # BLD AUTO: 0.12 X10*3/UL (ref 0–0.5)
IMM GRANULOCYTES NFR BLD AUTO: 0.6 % (ref 0–0.9)
INHALED O2 CONCENTRATION: 21 %
INR PPP: 3.2 (ref 0.9–1.1)
LACTATE BLDV-SCNC: 2 MMOL/L (ref 0.4–2)
LACTATE BLDV-SCNC: 2.3 MMOL/L (ref 0.4–2)
LACTATE SERPL-SCNC: 2 MMOL/L (ref 0.4–2)
LACTATE SERPL-SCNC: 2.5 MMOL/L (ref 0.4–2)
LEFT ATRIUM VOLUME AREA LENGTH INDEX BSA: 50.7 ML/M2
LEFT VENTRICLE INTERNAL DIMENSION DIASTOLE: 4.5 CM (ref 3.5–6)
LEFT VENTRICULAR OUTFLOW TRACT DIAMETER: 2.22 CM
LYMPHOCYTES # BLD AUTO: 0.78 X10*3/UL (ref 0.8–3)
LYMPHOCYTES NFR BLD AUTO: 3.8 %
MCH RBC QN AUTO: 28.3 PG (ref 26–34)
MCHC RBC AUTO-ENTMCNC: 33.4 G/DL (ref 32–36)
MCV RBC AUTO: 85 FL (ref 80–100)
MITRAL VALVE E/E' RATIO: 14.85
MONOCYTES # BLD AUTO: 1.34 X10*3/UL (ref 0.05–0.8)
MONOCYTES NFR BLD AUTO: 6.5 %
NEUTROPHILS # BLD AUTO: 18.22 X10*3/UL (ref 1.6–5.5)
NEUTROPHILS NFR BLD AUTO: 88.8 %
NRBC BLD-RTO: 0 /100 WBCS (ref 0–0)
OXYHGB MFR BLDV: 51.9 % (ref 45–75)
OXYHGB MFR BLDV: 69.9 % (ref 45–75)
OXYHGB MFR BLDV: 74.2 % (ref 45–75)
PCO2 BLDV: 37 MM HG (ref 41–51)
PCO2 BLDV: 38 MM HG (ref 41–51)
PCO2 BLDV: 42 MM HG (ref 41–51)
PH BLDV: 7.41 PH (ref 7.33–7.43)
PH BLDV: 7.44 PH (ref 7.33–7.43)
PH BLDV: 7.45 PH (ref 7.33–7.43)
PLATELET # BLD AUTO: 250 X10*3/UL (ref 150–450)
PO2 BLDV: 34 MM HG (ref 35–45)
PO2 BLDV: 45 MM HG (ref 35–45)
PO2 BLDV: 45 MM HG (ref 35–45)
POTASSIUM BLDV-SCNC: 3.4 MMOL/L (ref 3.5–5.3)
POTASSIUM BLDV-SCNC: 3.5 MMOL/L (ref 3.5–5.3)
POTASSIUM SERPL-SCNC: 3.5 MMOL/L (ref 3.5–5.3)
POTASSIUM UR-SCNC: 38 MMOL/L
POTASSIUM/CREAT UR-RTO: 48 MMOL/G CREAT
PROT SERPL-MCNC: 5.5 G/DL (ref 6.4–8.2)
PROTHROMBIN TIME: 37 SECONDS (ref 9.8–12.8)
Q ONSET: 214 MS
QRS COUNT: 11 BEATS
QRS DURATION: 136 MS
QT INTERVAL: 522 MS
QTC CALCULATION(BAZETT): 534 MS
QTC FREDERICIA: 530 MS
R AXIS: 137 DEGREES
RBC # BLD AUTO: 3.53 X10*6/UL (ref 4.5–5.9)
RIGHT VENTRICLE FREE WALL PEAK S': 8.05 CM/S
RIGHT VENTRICLE PEAK SYSTOLIC PRESSURE: 57.3 MMHG
SAO2 % BLDV: 53 % (ref 45–75)
SAO2 % BLDV: 72 % (ref 45–75)
SAO2 % BLDV: 76 % (ref 45–75)
SODIUM BLDV-SCNC: 130 MMOL/L (ref 136–145)
SODIUM BLDV-SCNC: 131 MMOL/L (ref 136–145)
SODIUM SERPL-SCNC: 137 MMOL/L (ref 136–145)
SODIUM UR-SCNC: 18 MMOL/L
SODIUM/CREAT UR-RTO: 23 MMOL/G CREAT
T AXIS: 23 DEGREES
T OFFSET: 475 MS
T4 FREE SERPL-MCNC: 1.11 NG/DL (ref 0.78–1.48)
TRICUSPID ANNULAR PLANE SYSTOLIC EXCURSION: 1.5 CM
TSH SERPL-ACNC: 7.36 MIU/L (ref 0.44–3.98)
VENTRICULAR RATE: 63 BPM
WBC # BLD AUTO: 20.5 X10*3/UL (ref 4.4–11.3)

## 2024-03-19 PROCEDURE — 96365 THER/PROPH/DIAG IV INF INIT: CPT | Mod: 59

## 2024-03-19 PROCEDURE — 2500000005 HC RX 250 GENERAL PHARMACY W/O HCPCS

## 2024-03-19 PROCEDURE — 84484 ASSAY OF TROPONIN QUANT: CPT | Performed by: STUDENT IN AN ORGANIZED HEALTH CARE EDUCATION/TRAINING PROGRAM

## 2024-03-19 PROCEDURE — 85610 PROTHROMBIN TIME: CPT | Performed by: EMERGENCY MEDICINE

## 2024-03-19 PROCEDURE — 71045 X-RAY EXAM CHEST 1 VIEW: CPT | Mod: FOREIGN READ | Performed by: RADIOLOGY

## 2024-03-19 PROCEDURE — 2500000005 HC RX 250 GENERAL PHARMACY W/O HCPCS: Performed by: STUDENT IN AN ORGANIZED HEALTH CARE EDUCATION/TRAINING PROGRAM

## 2024-03-19 PROCEDURE — 82436 ASSAY OF URINE CHLORIDE: CPT

## 2024-03-19 PROCEDURE — 84484 ASSAY OF TROPONIN QUANT: CPT

## 2024-03-19 PROCEDURE — 84443 ASSAY THYROID STIM HORMONE: CPT | Performed by: STUDENT IN AN ORGANIZED HEALTH CARE EDUCATION/TRAINING PROGRAM

## 2024-03-19 PROCEDURE — 83880 ASSAY OF NATRIURETIC PEPTIDE: CPT | Performed by: STUDENT IN AN ORGANIZED HEALTH CARE EDUCATION/TRAINING PROGRAM

## 2024-03-19 PROCEDURE — 85025 COMPLETE CBC W/AUTO DIFF WBC: CPT | Performed by: STUDENT IN AN ORGANIZED HEALTH CARE EDUCATION/TRAINING PROGRAM

## 2024-03-19 PROCEDURE — 96366 THER/PROPH/DIAG IV INF ADDON: CPT

## 2024-03-19 PROCEDURE — 93356 MYOCRD STRAIN IMG SPCKL TRCK: CPT | Performed by: INTERNAL MEDICINE

## 2024-03-19 PROCEDURE — 96367 TX/PROPH/DG ADDL SEQ IV INF: CPT

## 2024-03-19 PROCEDURE — 2020000001 HC ICU ROOM DAILY

## 2024-03-19 PROCEDURE — 2500000004 HC RX 250 GENERAL PHARMACY W/ HCPCS (ALT 636 FOR OP/ED)

## 2024-03-19 PROCEDURE — 3E033XZ INTRODUCTION OF VASOPRESSOR INTO PERIPHERAL VEIN, PERCUTANEOUS APPROACH: ICD-10-PCS | Performed by: INTERNAL MEDICINE

## 2024-03-19 PROCEDURE — 93306 TTE W/DOPPLER COMPLETE: CPT | Performed by: INTERNAL MEDICINE

## 2024-03-19 PROCEDURE — 84132 ASSAY OF SERUM POTASSIUM: CPT

## 2024-03-19 PROCEDURE — 99291 CRITICAL CARE FIRST HOUR: CPT | Mod: 25 | Performed by: EMERGENCY MEDICINE

## 2024-03-19 PROCEDURE — 93356 MYOCRD STRAIN IMG SPCKL TRCK: CPT

## 2024-03-19 PROCEDURE — 2500000004 HC RX 250 GENERAL PHARMACY W/ HCPCS (ALT 636 FOR OP/ED): Performed by: STUDENT IN AN ORGANIZED HEALTH CARE EDUCATION/TRAINING PROGRAM

## 2024-03-19 PROCEDURE — 84132 ASSAY OF SERUM POTASSIUM: CPT | Performed by: STUDENT IN AN ORGANIZED HEALTH CARE EDUCATION/TRAINING PROGRAM

## 2024-03-19 PROCEDURE — 93005 ELECTROCARDIOGRAM TRACING: CPT

## 2024-03-19 PROCEDURE — 82805 BLOOD GASES W/O2 SATURATION: CPT

## 2024-03-19 PROCEDURE — 2500000001 HC RX 250 WO HCPCS SELF ADMINISTERED DRUGS (ALT 637 FOR MEDICARE OP)

## 2024-03-19 PROCEDURE — 84439 ASSAY OF FREE THYROXINE: CPT | Performed by: STUDENT IN AN ORGANIZED HEALTH CARE EDUCATION/TRAINING PROGRAM

## 2024-03-19 PROCEDURE — 71250 CT THORAX DX C-: CPT

## 2024-03-19 PROCEDURE — 36620 INSERTION CATHETER ARTERY: CPT

## 2024-03-19 PROCEDURE — 74176 CT ABD & PELVIS W/O CONTRAST: CPT | Mod: FOREIGN READ | Performed by: RADIOLOGY

## 2024-03-19 PROCEDURE — 71250 CT THORAX DX C-: CPT | Mod: FOREIGN READ | Performed by: RADIOLOGY

## 2024-03-19 PROCEDURE — 96361 HYDRATE IV INFUSION ADD-ON: CPT

## 2024-03-19 PROCEDURE — 87040 BLOOD CULTURE FOR BACTERIA: CPT | Performed by: STUDENT IN AN ORGANIZED HEALTH CARE EDUCATION/TRAINING PROGRAM

## 2024-03-19 PROCEDURE — 36415 COLL VENOUS BLD VENIPUNCTURE: CPT | Performed by: STUDENT IN AN ORGANIZED HEALTH CARE EDUCATION/TRAINING PROGRAM

## 2024-03-19 PROCEDURE — 83605 ASSAY OF LACTIC ACID: CPT | Performed by: STUDENT IN AN ORGANIZED HEALTH CARE EDUCATION/TRAINING PROGRAM

## 2024-03-19 PROCEDURE — 71045 X-RAY EXAM CHEST 1 VIEW: CPT

## 2024-03-19 PROCEDURE — 96374 THER/PROPH/DIAG INJ IV PUSH: CPT | Mod: 59

## 2024-03-19 PROCEDURE — 36415 COLL VENOUS BLD VENIPUNCTURE: CPT

## 2024-03-19 PROCEDURE — 93308 TTE F-UP OR LMTD: CPT | Performed by: STUDENT IN AN ORGANIZED HEALTH CARE EDUCATION/TRAINING PROGRAM

## 2024-03-19 PROCEDURE — 99223 1ST HOSP IP/OBS HIGH 75: CPT | Performed by: STUDENT IN AN ORGANIZED HEALTH CARE EDUCATION/TRAINING PROGRAM

## 2024-03-19 PROCEDURE — 83735 ASSAY OF MAGNESIUM: CPT

## 2024-03-19 RX ORDER — VANCOMYCIN HYDROCHLORIDE 750 MG/150ML
750 INJECTION, SOLUTION INTRAVENOUS ONCE
Status: COMPLETED | OUTPATIENT
Start: 2024-03-19 | End: 2024-03-19

## 2024-03-19 RX ORDER — AZELASTINE 1 MG/ML
2 SPRAY, METERED NASAL 2 TIMES DAILY
Status: DISCONTINUED | OUTPATIENT
Start: 2024-03-19 | End: 2024-03-25 | Stop reason: HOSPADM

## 2024-03-19 RX ORDER — LIDOCAINE 560 MG/1
1 PATCH PERCUTANEOUS; TOPICAL; TRANSDERMAL DAILY
Status: DISCONTINUED | OUTPATIENT
Start: 2024-03-19 | End: 2024-03-25 | Stop reason: HOSPADM

## 2024-03-19 RX ORDER — CARBIDOPA AND LEVODOPA 25; 100 MG/1; MG/1
0.5 TABLET ORAL
Status: DISCONTINUED | OUTPATIENT
Start: 2024-03-19 | End: 2024-03-25 | Stop reason: HOSPADM

## 2024-03-19 RX ORDER — ATORVASTATIN CALCIUM 20 MG/1
20 TABLET, FILM COATED ORAL DAILY
Status: DISCONTINUED | OUTPATIENT
Start: 2024-03-19 | End: 2024-03-21

## 2024-03-19 RX ORDER — VANCOMYCIN HYDROCHLORIDE 1 G/20ML
INJECTION, POWDER, LYOPHILIZED, FOR SOLUTION INTRAVENOUS DAILY PRN
Status: DISCONTINUED | OUTPATIENT
Start: 2024-03-19 | End: 2024-03-20

## 2024-03-19 RX ORDER — CARBIDOPA AND LEVODOPA 25; 100 MG/1; MG/1
1 TABLET ORAL 4 TIMES DAILY
Status: DISCONTINUED | OUTPATIENT
Start: 2024-03-19 | End: 2024-03-19

## 2024-03-19 RX ORDER — NOREPINEPHRINE BITARTRATE/D5W 8 MG/250ML
PLASTIC BAG, INJECTION (ML) INTRAVENOUS
Status: COMPLETED
Start: 2024-03-19 | End: 2024-03-19

## 2024-03-19 RX ORDER — POTASSIUM CHLORIDE 1.5 G/1.58G
40 POWDER, FOR SOLUTION ORAL ONCE
Status: COMPLETED | OUTPATIENT
Start: 2024-03-19 | End: 2024-03-19

## 2024-03-19 RX ORDER — CARBIDOPA AND LEVODOPA 25; 100 MG/1; MG/1
2 TABLET ORAL
Status: DISCONTINUED | OUTPATIENT
Start: 2024-03-19 | End: 2024-03-25 | Stop reason: HOSPADM

## 2024-03-19 RX ORDER — PANTOPRAZOLE SODIUM 40 MG/1
40 TABLET, DELAYED RELEASE ORAL
Status: DISCONTINUED | OUTPATIENT
Start: 2024-03-20 | End: 2024-03-25 | Stop reason: HOSPADM

## 2024-03-19 RX ORDER — LEVOTHYROXINE SODIUM 88 UG/1
88 TABLET ORAL
Status: DISCONTINUED | OUTPATIENT
Start: 2024-03-20 | End: 2024-03-25 | Stop reason: HOSPADM

## 2024-03-19 RX ORDER — CINACALCET 30 MG/1
30 TABLET, FILM COATED ORAL DAILY
Status: DISCONTINUED | OUTPATIENT
Start: 2024-03-19 | End: 2024-03-19

## 2024-03-19 RX ORDER — VANCOMYCIN HYDROCHLORIDE 500 MG/100ML
500 INJECTION, SOLUTION INTRAVENOUS ONCE
Status: COMPLETED | OUTPATIENT
Start: 2024-03-19 | End: 2024-03-19

## 2024-03-19 RX ORDER — CARBIDOPA AND LEVODOPA 25; 100 MG/1; MG/1
2.5 TABLET, EXTENDED RELEASE ORAL ONCE
Status: COMPLETED | OUTPATIENT
Start: 2024-03-19 | End: 2024-03-19

## 2024-03-19 RX ORDER — IPRATROPIUM BROMIDE 21 UG/1
2 SPRAY, METERED NASAL 2 TIMES DAILY
Status: DISCONTINUED | OUTPATIENT
Start: 2024-03-19 | End: 2024-03-25 | Stop reason: HOSPADM

## 2024-03-19 RX ORDER — NOREPINEPHRINE BITARTRATE/D5W 8 MG/250ML
0-3.3 PLASTIC BAG, INJECTION (ML) INTRAVENOUS CONTINUOUS
Status: DISCONTINUED | OUTPATIENT
Start: 2024-03-19 | End: 2024-03-21

## 2024-03-19 RX ORDER — POLYETHYLENE GLYCOL 3350 17 G/17G
17 POWDER, FOR SOLUTION ORAL DAILY
Status: DISCONTINUED | OUTPATIENT
Start: 2024-03-19 | End: 2024-03-20

## 2024-03-19 RX ORDER — CARBIDOPA AND LEVODOPA 25; 100 MG/1; MG/1
1 TABLET ORAL DAILY
Status: DISCONTINUED | OUTPATIENT
Start: 2024-03-19 | End: 2024-03-19

## 2024-03-19 RX ADMIN — PIPERACILLIN SODIUM AND TAZOBACTAM SODIUM 4.5 G: 4; .5 INJECTION, SOLUTION INTRAVENOUS at 14:11

## 2024-03-19 RX ADMIN — NOREPINEPHRINE BITARTRATE 0.04 MCG/KG/MIN: 8 INJECTION, SOLUTION INTRAVENOUS at 15:00

## 2024-03-19 RX ADMIN — VANCOMYCIN HYDROCHLORIDE 500 MG: 500 INJECTION, SOLUTION INTRAVENOUS at 14:42

## 2024-03-19 RX ADMIN — APIXABAN 2.5 MG: 2.5 TABLET, FILM COATED ORAL at 22:29

## 2024-03-19 RX ADMIN — SODIUM CHLORIDE 1000 ML: 9 INJECTION, SOLUTION INTRAVENOUS at 14:12

## 2024-03-19 RX ADMIN — PIPERACILLIN SODIUM AND TAZOBACTAM SODIUM 2.25 G: 2; .25 INJECTION, SOLUTION INTRAVENOUS at 18:30

## 2024-03-19 RX ADMIN — CARBIDOPA AND LEVODOPA 2.5 TABLET: 25; 100 TABLET, EXTENDED RELEASE ORAL at 22:30

## 2024-03-19 RX ADMIN — CARBIDOPA AND LEVODOPA 2 TABLET: 25; 100 TABLET ORAL at 18:44

## 2024-03-19 RX ADMIN — POTASSIUM CHLORIDE 40 MEQ: 1.5 POWDER, FOR SOLUTION ORAL at 19:55

## 2024-03-19 RX ADMIN — NOREPINEPHRINE BITARTRATE 0.02 MCG/KG/MIN: 8 INJECTION, SOLUTION INTRAVENOUS at 14:43

## 2024-03-19 RX ADMIN — IPRATROPIUM BROMIDE 2 SPRAY: 21 SPRAY, METERED NASAL at 22:30

## 2024-03-19 RX ADMIN — SODIUM CHLORIDE 1000 ML: 9 INJECTION, SOLUTION INTRAVENOUS at 14:29

## 2024-03-19 RX ADMIN — VANCOMYCIN HYDROCHLORIDE 750 MG: 750 INJECTION, SOLUTION INTRAVENOUS at 14:42

## 2024-03-19 RX ADMIN — AZELASTINE HYDROCHLORIDE 2 SPRAY: 137 SPRAY, METERED NASAL at 22:30

## 2024-03-19 RX ADMIN — PIPERACILLIN SODIUM AND TAZOBACTAM SODIUM 2.25 G: 2; .25 INJECTION, SOLUTION INTRAVENOUS at 23:34

## 2024-03-19 ASSESSMENT — LIFESTYLE VARIABLES
HAVE PEOPLE ANNOYED YOU BY CRITICIZING YOUR DRINKING: NO
EVER HAD A DRINK FIRST THING IN THE MORNING TO STEADY YOUR NERVES TO GET RID OF A HANGOVER: NO
EVER FELT BAD OR GUILTY ABOUT YOUR DRINKING: NO
HAVE YOU EVER FELT YOU SHOULD CUT DOWN ON YOUR DRINKING: NO

## 2024-03-19 ASSESSMENT — PAIN SCALES - GENERAL
PAINLEVEL_OUTOF10: 0 - NO PAIN

## 2024-03-19 ASSESSMENT — PAIN - FUNCTIONAL ASSESSMENT
PAIN_FUNCTIONAL_ASSESSMENT: 0-10
PAIN_FUNCTIONAL_ASSESSMENT: 0-10

## 2024-03-19 ASSESSMENT — COLUMBIA-SUICIDE SEVERITY RATING SCALE - C-SSRS
2. HAVE YOU ACTUALLY HAD ANY THOUGHTS OF KILLING YOURSELF?: NO
6. HAVE YOU EVER DONE ANYTHING, STARTED TO DO ANYTHING, OR PREPARED TO DO ANYTHING TO END YOUR LIFE?: NO
1. IN THE PAST MONTH, HAVE YOU WISHED YOU WERE DEAD OR WISHED YOU COULD GO TO SLEEP AND NOT WAKE UP?: NO

## 2024-03-19 NOTE — CONSULTS
Vancomycin Dosing by Pharmacy- INITIAL    Brady Ortega is a 78 y.o. year old male who Pharmacy has been consulted for vancomycin dosing for other UTI . Based on the patient's indication and renal status this patient will be dosed based on a goal trough/random level of 15-20.     Renal function is currently declining, BEKA on CKD 3.    Visit Vitals  /70 (BP Location: Right arm, Patient Position: Lying)   Pulse 77   Temp 36.8 °C (98.2 °F) (Temporal)   Resp 18        Lab Results   Component Value Date    CREATININE 2.34 (H) 03/19/2024        Patient weight is 65.8 kg.       No intake/output data recorded.  [unfilled]    Lab Results   Component Value Date    PATIENTTEMP 37.0 03/19/2024    PATIENTTEMP 37.0 03/19/2024          Assessment/Plan     Patient has already been given a loading dose of 1250 mg.  Will initiate vancomycin dose by level.     Follow-up level will be ordered on 3/20 at 1400 unless clinically indicated sooner.  Will continue to monitor renal function daily while on vancomycin and order serum creatinine at least every 48 hours if not already ordered.  Follow for continued vancomycin needs, clinical response, and signs/symptoms of toxicity.       Leisa Chamberlain, PharmD

## 2024-03-19 NOTE — CONSULTS
Inpatient consult to Cardiology  Consult performed by: Josh Pack MD  Consult ordered by: Rivera Loredo DO        Reason for consult: CARDIOGENIC SHOCK      History Of Present Illness:    Brady Ortega is a 78 y.o. 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 Cardiology consulted for CARDIOGENIC SHOCK (lactate 2.3, PH 7.44). Patient is s/p 1000 ml bolus, on room air, EF at bedside with apical sparing, overall low normal, bi-atrial enlargement, and no collapsible IVC).    H/o bacteremia secondary to skin infx in October s/p 6 week levofloxacin.  H/o RLE cellulitis 2/24 DC to nursing home. At nursing home developed dysuria, started on PO Cipro yesterday. Presented today to ER due to worsening chills, rigors.     Afib on appropriate dose-reduced Eliquis 2.5mg BID (age >80, Cr >1.5)  Chronic HFpEF with echo c/f amyloidosis, LVEF 64%, severely and diffusely increased wall thickness and low TDI velocities suggestive of infiltrative disease on echo 10/2023.     TTE 9/5/23  CONCLUSIONS:   - Technically difficult exam due to suboptimal positioning.   - Exam indication: LV function   - The left ventricle is mildly dilated. There is moderate concentric left   ventricular hypertrophy. Left ventricular systolic function is normal. EF = 54 ±   5% (2D biplane) Left ventricular diastolic function was not evaluated due to AF.   On 2D speckle tracking there is a pattern of relative sparing of longitudinal   strain in the apical compared to the mid and basal segments which can be seen in   cardiac amyloidosis.  "The relative regional strain ratio is 1.5 (a score of greater    than 1 is sensitive and specific for the diagnosis of cardiac amyloidosis).   Clinical correlation advised.   - The right ventricle is normal in size. Right ventricular systolic function is   low normal.   - 1-2+ MR.   - There is moderate (2+) tricuspid valve regurgitation.   - Estimated right ventricular systolic pressure is 43 mmHg consistent with mild   pulmonary hypertension. Estimated right atrial pressure is 15 mmHg based on IVC   assessment.   - Exam was compared with the prior  echocardiographic exam performed on   10/25/21. Increase in MR/TR/AI.     TTE 2016:  CONCLUSIONS:  1. The left ventricular systolic function is normal with a 60% estimated ejection fraction.  2. Spectral Doppler shows an impaired relaxation pattern of left ventricular diastolic filling.  3. The left atrium is mild to moderately dilated.  4. There is aortic valve annular calcification.  5. There is mild aortic valve regurgitation.       Last Recorded Vitals:  Vitals:    03/19/24 1402 03/19/24 1418 03/19/24 1439 03/19/24 1443   BP: (!) 77/48 (!) 80/48 (!) 82/40 80/52   Pulse: 70 61 62 66   Resp: 16 17 17    Temp: 36.3 °C (97.3 °F)      TempSrc: Temporal      SpO2: 97% 98% 97%    Weight: 65.8 kg (145 lb)      Height: 1.6 m (5' 3\")          Last Labs:  CBC - No results in last year.  _ _ _    _      CMP - No results in last year.  _ _ _ --- _   _ _ _ _      PTT - No results in last year.  _   _ _     No results found for: \"TROPHS\", \"BNP\", \"HGBA1C\", \"LDLCALC\", \"VLDL\"   Last I/O:  No intake/output data recorded.    Past Cardiology Tests (Last 3 Years):  EKG:  No results found for this or any previous visit from the past 1095 days.    Echo:  No results found for this or any previous visit from the past 1095 days.    Ejection Fractions:  No results found for: \"EF\"  Cath:  No results found for this or any previous visit from the past 1095 days.    Stress Test:  No results found " for this or any previous visit from the past 1095 days.    Cardiac Imaging:  No results found for this or any previous visit from the past 1095 days.      Past Medical History:  He has a past medical history of Dry eye syndrome of left lacrimal gland (10/30/2015), Dry eye syndrome of left lacrimal gland (10/30/2015), Hordeolum internum left lower eyelid (12/04/2015), Noninfective gastroenteritis and colitis, unspecified, Other bursitis of knee, unspecified knee, Personal history of other diseases of the respiratory system, and Personal history of other endocrine, nutritional and metabolic disease.    Past Surgical History:  He has a past surgical history that includes Back surgery (11/15/2013); Hernia repair (11/15/2013); Vasectomy (11/15/2013); Total hip arthroplasty (11/15/2013); Lung lobectomy (01/13/2017); and Other surgical history (01/13/2017).      Social History:  He reports that he quit smoking about 59 years ago. His smoking use included cigarettes. He has been exposed to tobacco smoke. He has never used smokeless tobacco. He reports that he does not drink alcohol and does not use drugs.    Family History:  No family history on file.     Allergies:  Pollen extracts, Bactrim [sulfamethoxazole-trimethoprim], Risedronate, and Sulfa (sulfonamide antibiotics)    Inpatient Medications:  Scheduled medications   Medication Dose Route Frequency    sodium chloride  1,000 mL intravenous Once    vancomycin  750 mg intravenous Once    Followed by    vancomycin  500 mg intravenous Once     PRN medications   Medication     Continuous Medications   Medication Dose Last Rate    norepinephrine  0-3.3 mcg/kg/min 0.02 mcg/kg/min (03/19/24 1443)     Outpatient Medications:  Current Outpatient Medications   Medication Instructions    acetaminophen (TYLENOL) 1,000 mg, oral, 2 times daily    apixaban (ELIQUIS) 2.5 mg, oral, 2 times daily    atorvastatin (LIPITOR) 20 mg, oral, Daily    azelastine (Astelin) 137 mcg (0.1 %) nasal  spray 2 sprays, Each Nostril, 2 times daily, Use in each nostril as directed    carbidopa-levodopa (Sinemet)  mg tablet 1 tablet, oral, As directed    cholecalciferol (VITAMIN D-3) 25 mcg, oral, Daily    cinacalcet (SENSIPAR) 30 mg, oral, Daily, Take with food or shortly afer a meal. Swallow tablet whole; do not break or divide.    ciprofloxacin (CIPRO) 500 mg, oral, 2 times daily    coenzyme Q-10 200 mg, oral, Daily RT    diclofenac sodium (VOLTAREN) 4 g, Topical, 2 times daily PRN, To lower back     famotidine (Pepcid) 20 mg tablet oral, Nightly    gabapentin (NEURONTIN) 100 mg, oral, Nightly    gabapentin (NEURONTIN) 100 mg, oral, Daily PRN    glucosamine-chondroit-vit C-Mn 500-400 mg capsule 2 capsules, oral, Daily    ipratropium (Atrovent) 21 mcg (0.03 %) nasal spray 2 sprays, Each Nostril, 2 times daily    levothyroxine (SYNTHROID, LEVOXYL) 88 mcg, oral, Daily before breakfast    methocarbamol (ROBAXIN) 500 mg, oral, 2 times daily PRN    metOLazone (ZAROXOLYN) 2.5 mg, oral, Every other day, 30 mins prior to torsemide dose    multivitamin tablet 1 tablet, oral, Daily    olopatadine (Patanol) 0.1 % ophthalmic solution 1 drop, Both Eyes, 2 times daily    tadalafil (CIALIS) 5 mg, oral, Daily, Take in AM    tamsulosin (FLOMAX) 0.4 mg, oral, Daily    terazosin (HYTRIN) 5 mg, oral, Nightly PRN    torsemide (Demadex) 20 mg tablet Daily, 20mg  alternating with 40 mg        Physical Exam:  General: well appearing, no acute distress  Cardiac: irregular rate and rhythm, no murmurs, rubs or gallops  Pulmonary: Clear to auscultation bilaterally, normal respiratory effort  Peripheral pulses: intact, 2+  Extremities: No peripheral edema        Assessment/Plan   Urosepsis: h/o dysuria, UA dirty, rigors/chills and recently straight cath/had sherwood. Notable h/o bacteremia from skin infection in oct 2023  H/o Suspected cardiac amyloidosis (apical sparing pattern on echo, low voltage QRS on EKG)  Concern for mixed cardiogenic  and septic/distributive shock  Paroxysmal AF on low dose apixaban     Recommendation:  -On levo 0.08, appears euvolemic at this time. IVC appears somewhat dilated and non collapsible, would avoid significant addition of fluids  -Blood cx, Rx for septic shock   -Potentially would benefit from PAC, PAC-based shock management given underlying amyloid  -Agree with cardiac biomarkers (troponin, BNP)  -Amyloid workup might include:  -Serum protein immunofixation, urine protein immunofixation, or serum free light chain ratio analysis  -CMR/PYP scan  -Recommend admission to CICU/HFICU or MICU w cardiology consult  -Avoid beta blockers or vasodilators   -Consider advanced HF consult    Thank you for this very interesting consult.     Patient was seen and discussed with attending Dr. Renee Pack MD  PGY-6 Cardiovascular Disease Fellow  [employer]  [facility]    EP consult pager: [phone] (MON-FRI 7a-6p; SAT-SUN 7a-2p)  EP device nurse pager: [phone] (MON-FRI 7a-6p; SAT-SUN 7a-2p)  Gen Cards consult pager: [phone] (MON-FRI 7a-6p; SAT-SUN 7a-2p)  CICU fellow pager: [phone] (covers EP and General Cardiology Consults after hours)  Advanced Heart Failure Consult Pager: [phone] anytime  CICU Fellow Pager: [phone] anytime  Endovascular /Limb Salvage Team Pager: [phone] for day coverage (8am-5pm)  Interventional Cardiology Fellow Pager: [phone] for night and weekend coverage  Structural Heart Team Pager: [phone] anytime  Night coverage: [facility] [id number]        Code Status:  No Order        Josh Pack MD

## 2024-03-19 NOTE — ED TRIAGE NOTES
Pt from home brought to ED with hypotension and lethargy. Pt recently discharged home after having sepsis, Pt awake and alert upon arrival, denies any pain, no N/V. Recent treatment for a UTI and home health nurse noted hematuria.

## 2024-03-19 NOTE — ED PROVIDER NOTES
History of Present Illness   CC: Hypotension     History provided by: Patient and EMS  Limitations to History: None    HPI:  Brady Ortega is a 78 y.o. male with history of parkinsonism, CKD stage III, A-fib with slow ventricular response on Eliquis, hypertension with slow, CAD, history of lung cancer status post left lower lobe lobectomy who was recently admitted for sepsis, ATN, discharged 2 days ago from acute rehab to a SNF, had urine specimen at his SNF a few days ago showing signs concerning for UTI, was started on ciprofloxacin, today had decreased responsiveness at his SNF, EMS was called, patient was noted to be hypotensive, brought to the emergency department.  Patient without any significant complaints at this time, states that he has no pain in his chest, back, abdomen, denies abdominal pain, nausea, vomiting.  Has felt chills, but no measured fevers.  Denies any falls or trauma.  Denies any shortness of breath.  Reports compliance with all of his medications.    External Records Reviewed: Reviewed discharge summary from 2/22/2024    Physical Exam   Triage vitals:  T 36.3 °C (97.3 °F)  HR 70  BP (!) 77/48  RR 16  O2 97 % None (Room air)    Vital signs reviewed in nursing triage note, EMR flow sheets, and at patient's bedside.   General: Awake, alert, in no acute distress  Eyes: Gaze conjugate.  No scleral icterus or injection  HENT: Normo-cephalic, atraumatic. No stridor.  CV: Regular rate, Regular rhythm. Radial pulses 2+ bilaterally.  Right DP pulse 2+, left DP pulse 1+, visualized with pulse-wave Doppler on point-of-care ultrasound.  1+ bilateral lower extreme edema.  Resp: Breathing non-labored, speaking in full sentences.  Clear to auscultation bilaterally.  No wheezes, rales, rhonchi.  GI: Soft, non-distended, non-tender. No rebound or guarding.  No CVA tenderness bilaterally  MSK/Extremities: No gross bony deformities. Moving all extremities  Skin: Warm. Appropriate color.  No rashes  noted  Neuro: Alert. Oriented. Face symmetric. Speech is fluent.  Moving all extremities appropriately..  Sensation intact light touch in all 4 extremities.    ED Course & Medical Decision Making   ED Course:  ED Course as of 03/19/24 1539   Tue Mar 19, 2024   1415 Blood Gas Venous Full Panel Unsolicited(!)  Mild respiratory alkalosis, mild lactate elevation [SH]   1442 EKG obtained at 1/4/2007, demonstrating atrial fibrillation with slow ventricular response, rate of 63, rightward axis, slightly widened QRS, prolonged QTc interval, no ST segment or T wave signs of ischemia. [SH]   1452 Discussed patient with the cardiology fellow who says there will be delay in him being able to evaluate the patient, recommends admission to an ICU, he does not think there would be a significant difference between admission to the MICU versus the ICU versus heart failure ICU. [SH]      ED Course User Index  [SH] Jb Oquendo MD         Diagnoses as of 03/19/24 1539   Shock (CMS/HCC)   Sepsis with acute organ dysfunction and septic shock, due to unspecified organism, unspecified organ dysfunction type (CMS/HCC)   Acute on chronic heart failure, unspecified heart failure type (CMS/HCC)       Differential diagnoses considered include but are not limited to: Septic shock, cardiogenic shock, neurogenic shock,, dehydration, hypovolemic shock    Social Determinants Limiting Care: None identified    MDM:  78 y.o. male presenting to the emergency department hypotensive.  On arrival vital signs notable for hypertension, patient not tachycardic.  Exam concerning for shock.  Was recently diagnosed with UTI, so concern for potential septic shock.  Patient given 1 L of IV fluids on pressure bag, but has lower extremity edema.  Point-of-care ultrasound performed after completion of IV fluids revealed plethoric IVC, reduced ejection fraction concerning for mixed cardiogenic shock with septic shock.  Given his plethoric IVC, we did not feel  comfortable administering additional IV fluids.  Reperfusion exam was performed at that time, he continues to have cap refill hovering right around 2 seconds, mentation is appropriate, but MAP is less than 65.  Decision was made to place patient on Levophed.  I discussed patient with cardiology fellow given my concern for potential cardiogenic component of his sepsis and shock.  He agrees that patient will require ICU for admission, states he can either go to medical or cardiac intensive care and feels that the care the patient will receive would be appropriate either.  I did discuss the patient's clinical status with his wife.  She confirmed the history that was provided by EMS.  Due to bed availability in the medical ICU compared to the cardiac ICU, I discussed the patient with the cardiac ICU attending who accepted him for admission.  We will obtain CT chest on pelvis without contrast on the way up, and labs are currently pending as the patient is transported up to the ICU.    Disposition   As a result of their workup, the patient will require admission to the hospital.  The patient was informed of their diagnosis.  Patient was given the opportunity to ask questions and answered them.  Patient agreed to be admitted to the hospital.    Procedures     Performed by: Jb Oquendo MD  Authorized by: Jb Oquendo MD  Cardiac Indications: hypotension                Procedure: Cardiac Ultrasound    Findings:   Views: parasternal long, parasternal short, apical four and subxiphoid  Effusion: The pericardial space was visualized and was positive for a PERICARDIAL EFFUSION.  Activity: Ventricular contractions were visualized.  LV: LV systolic function was DECREASED.  RV: RV size was NORMAL.    Impression:  Cardiac: The focused cardiac ultrasound exam had ABNORMAL findings as specified.    Comments: Plethoric, non-collapsing IVC      Patient seen and discussed with ED attending physician.    Bryant Oquendo MD  PGY 3  Emergency Medicine         Jb Oquendo MD  Resident  03/19/24 [id number]

## 2024-03-19 NOTE — H&P
History Of Present Illness  Mr. Brady Ortega is an 78 year old male with PMHX of Parkinsonism, CKD stage 3 (baseline 1.3-1.6), Afib on Eliquis, HTN, CAD, Hx of lung Ca s/p LLL lobectomy, Chronic Diastolic HF 2/2 suspected cardiac amyloidosis with no workup, and recent admission to [facility] 2/16-2/22 for Sepsis 2/2 cellulitis of RLL who presented to the [facility] ED form [facility] with hypotension and lethargy. He also complained of chills but denied any objective fevers.  He was recently started on Cipro for a UTI at the [facility] but was found to be hypotensive so was sent to the ED. In the ED initial vitals showed Temp 97.3, HR 70, RR 16, BP 77/48, satting 97% on RA. UA was concerning for infection with large leukocyte esterase, >50 WBC, RBC. Initial VBG showed pH 7.44, PCOI2 38, PO2 45, Lactate 2.3 He was given 1L of LR with slight improvement into the 80s/40s still with appropriate mentation. Repeat VBG showed improved of the lactate to 2.0 but he remained persistently hypotensive and was started on Levo. EKG showed Afib with HR of 63. CXR showed unchanged left basilar PTX and cardiomegaly. A CT CA&P was obtained prior to arrival.   Cardiology was consulted down in the ED due to concern of cardiogenic shock with patient's history of suspected cardiac amyloidosis. Bedside POCUS revealed Normal low normal EF with Biatrial enlargement. On arrival to the CICU patient was mentating appropriately and was A&O x4. Was on Levo at 0.08 that was able to be weaned 0.04. Story provided by wife who was present at bedside. States he was recently discharged from [facility] to home where he started to have some shaking chills that were different than his normal Parkinson tremor. He also had some cloudy urine and wife asked for nurse to collect urine and was reportedly abnormal and was started on a course of Cipro. Received one dose last night and another this am. Denied any fever, lightheadedness, dizziness, heart palpitations, chest pain,  nausea, vomiting, diarrhea, hematuria at home or any other acute concerns. Patient was more drowsy per wife yesterday and this am.     TTE: 10/2023 CONCLUSIONS: - Exam indication: Hypotension - The left ventricle is normal in size. Left ventricular systolic function is normal. EF = 64 ± 5% (2D biplane) There is severely and diffusely increased wall thickness and very low TDI velocities, suggesting infiltrative disease (such as amyloidosis). SV = 35 mL, CO is calculated 2.1 L/min (restrictive cardiomyopathy). - The right ventricle is moderately dilated. Right ventricular systolic function is moderately decreased. RV free wall is also thickened (7.7 mm) - The left atrial cavity is moderately dilated. - The right atrial cavity is mildly dilated. - There is mild (1+) mitral valve regurgitation. - There is moderate (2+) tricuspid valve regurgitation. Tricuspid valve with thichened chordae and tethered leaflets, resulting in poor coaptation. - There is mild (1+) aortic valve regurgitation. - Estimated right ventricular systolic pressure is 38 mmHg consistent with mild pulmonary hypertension. Estimated right atrial pressure is 15 mmHg based on IVC assessment. - Trace pericardial effusion - Left pleural effusion. - Exam was compared with the prior CC echocardiographic exam performed on 9/5/23. ([facility]) similar findings. Of note, prior study included LV strain with an apical sparing pattern consistent with amyloidosis.     TTE 2021: 2021 CONCLUSIONS: - Technically difficult exam due to body habitus and suboptimal positioning. - Exam indication: Abnormal ECG - The left ventricle is normal in size. There is mild concentric left ventricular hypertrophy. Left ventricular systolic function is normal. EF = 58 ± 5% (2D biplane) Grade II left ventricular diastolic dysfunction. - The right ventricle is normal in size. Right ventricular systolic function is normal. - The left atrial cavity is mildly dilated. - Trivial AR. - Exam  was compared with the prior  echocardiographic exam performed on 2020 There is no significant change.      Stress Testin CONCLUSIONS:  1. SPECT Perfusion Study: Normal.  2. There is no scintigraphic evidence for inducible ischemia.  3. No evidence of scarred myocardium.  4. Functional capacity N/A (pharmacological).  5. Left ventricle is mildly dilated. The left ventricle systolic function is normal.  6. Right ventricle is mildly dilated. The right ventricle systolic function is mildly decreased.  7. This is a low risk scan.      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 10/30/2015    Dry eye syndrome of left lacrimal gland    Hordeolum internum left lower eyelid 2015    Hordeolum internum of left lower eyelid    Noninfective gastroenteritis and colitis, unspecified     Colitis    Other bursitis of knee, unspecified knee     Knee bursitis    Personal history of other diseases of the respiratory system     History of asthma    Personal history of other endocrine, nutritional and metabolic disease     History of hyperlipidemia       Surgical History  Past Surgical History:   Procedure Laterality Date    BACK SURGERY  11/15/2013    Back Surgery    HERNIA REPAIR  11/15/2013    Hernia Repair    LUNG LOBECTOMY  2017    Lung Lobectomy    OTHER SURGICAL HISTORY  2017    Wedge Resection Of Lung    TOTAL HIP ARTHROPLASTY  11/15/2013    Hip Replacement    VASECTOMY  11/15/2013    Surgery Vas Deferens Vasectomy        Social History  He reports that he quit smoking about 59 years ago. His smoking use included cigarettes. He has been exposed to tobacco smoke. He has never used smokeless tobacco. He reports that he does not drink alcohol and does not use drugs.    Family History  No family history on file.     Allergies  Pollen extracts,  "Bactrim [sulfamethoxazole-trimethoprim], Risedronate, and Sulfa (sulfonamide antibiotics)       Physical Exam  GENERAL:  In no acute distress  NEUROLOGIC:  A and O x 3. Cranial nerves 2 through 12 grossly intact with no gait disturbances. Intact motor and sensory systems, with normal reflexes and coordination.    HEENT:  Pupils are equal, round, and reactive to light and accommodation. Extraocular motion intact.    MOUTH: Dry mucus membranes, no lesions observed  CARDIAC: S1 + S2, RRR, no M/R/G.    LUNGS: CTA BL.  No wheezes or coarse breath sounds. Symmetric respirations. No increased work of breathing.   ABDOMEN: Soft, non-tender to palpation. No organomegaly. Normal BS in 4Q, No rebound or guarding.  EXTREMITIES:  Moving x4 equally and spontaneously with no joint warmth or swelling.    SKIN: Clean, warm, dry, and intact with normal skin turgor.  PSYCH: Appropriate mood and behavior.       Last Recorded Vitals  Blood pressure 107/70, pulse 77, temperature 36.8 °C (98.2 °F), temperature source Temporal, resp. rate 18, height 1.6 m (5' 3\"), weight 65.8 kg (145 lb), SpO2 98 %.    Relevant Results  Scheduled medications  apixaban, 2.5 mg, oral, BID  atorvastatin, 20 mg, oral, Daily  azelastine, 2 spray, Each Nostril, BID  carbidopa-levodopa, 0.5 tablet, oral, Daily  carbidopa-levodopa, 2 tablet, oral, 4 times per day  ipratropium, 2 spray, Each Nostril, BID  [START ON 3/20/2024] levothyroxine, 88 mcg, oral, Daily before breakfast  [START ON 3/20/2024] pantoprazole, 40 mg, oral, Daily before breakfast  perflutren lipid microspheres, 0.5-10 mL of dilution, intravenous, Once in imaging  polyethylene glycol, 17 g, oral, Daily      Continuous medications  norepinephrine, 0-3.3 mcg/kg/min, Last Rate: 0.06 mcg/kg/min (03/19/24 1645)      PRN medications  Results for orders placed or performed during the hospital encounter of 03/19/24 (from the past 24 hour(s))   Blood Gas Venous Full Panel Unsolicited   Result Value Ref " Range    POCT pH, Venous 7.44 (H) 7.33 - 7.43 pH    POCT pCO2, Venous 38 (L) 41 - 51 mm Hg    POCT pO2, Venous 45 35 - 45 mm Hg    POCT SO2, Venous 72 45 - 75 %    POCT Oxy Hemoglobin, Venous 69.9 45.0 - 75.0 %    POCT Hematocrit Calculated, Venous 31.0 (L) 41.0 - 52.0 %    POCT Sodium, Venous 131 (L) 136 - 145 mmol/L    POCT Potassium, Venous 3.5 3.5 - 5.3 mmol/L    POCT Chloride, Venous 98 98 - 107 mmol/L    POCT Ionized Calicum, Venous 1.08 (L) 1.10 - 1.33 mmol/L    POCT Glucose, Venous 116 (H) 74 - 99 mg/dL    POCT Lactate, Venous 2.3 (H) 0.4 - 2.0 mmol/L    POCT Base Excess, Venous 1.6 -2.0 - 3.0 mmol/L    POCT HCO3 Calculated, Venous 25.8 22.0 - 26.0 mmol/L    POCT Hemoglobin, Venous 10.3 (L) 13.5 - 17.5 g/dL    POCT Anion Gap, Venous 11.0 10.0 - 25.0 mmol/L    Patient Temperature 37.0 degrees Celsius   CBC and Auto Differential   Result Value Ref Range    WBC 20.5 (H) 4.4 - 11.3 x10*3/uL    nRBC 0.0 0.0 - 0.0 /100 WBCs    RBC 3.53 (L) 4.50 - 5.90 x10*6/uL    Hemoglobin 10.0 (L) 13.5 - 17.5 g/dL    Hematocrit 29.9 (L) 41.0 - 52.0 %    MCV 85 80 - 100 fL    MCH 28.3 26.0 - 34.0 pg    MCHC 33.4 32.0 - 36.0 g/dL    RDW 15.3 (H) 11.5 - 14.5 %    Platelets 250 150 - 450 x10*3/uL    Neutrophils % 88.8 40.0 - 80.0 %    Immature Granulocytes %, Automated 0.6 0.0 - 0.9 %    Lymphocytes % 3.8 13.0 - 44.0 %    Monocytes % 6.5 2.0 - 10.0 %    Eosinophils % 0.1 0.0 - 6.0 %    Basophils % 0.2 0.0 - 2.0 %    Neutrophils Absolute 18.22 (H) 1.60 - 5.50 x10*3/uL    Immature Granulocytes Absolute, Automated 0.12 0.00 - 0.50 x10*3/uL    Lymphocytes Absolute 0.78 (L) 0.80 - 3.00 x10*3/uL    Monocytes Absolute 1.34 (H) 0.05 - 0.80 x10*3/uL    Eosinophils Absolute 0.03 0.00 - 0.40 x10*3/uL    Basophils Absolute 0.04 0.00 - 0.10 x10*3/uL   Lactate   Result Value Ref Range    Lactate 2.5 (H) 0.4 - 2.0 mmol/L   Troponin I, High Sensitivity   Result Value Ref Range    Troponin I, High Sensitivity 237 (HH) 0 - 53 ng/L   B-Type  Natriuretic Peptide   Result Value Ref Range     (H) 0 - 99 pg/mL   TSH with reflex to Free T4 if abnormal   Result Value Ref Range    Thyroid Stimulating Hormone 7.36 (H) 0.44 - 3.98 mIU/L   Coagulation Screen   Result Value Ref Range    Protime 37.0 (H) 9.8 - 12.8 seconds    INR 3.2 (H) 0.9 - 1.1    aPTT 39 (H) 27 - 38 seconds   Transthoracic Echo (TTE) Complete   Result Value Ref Range    AV pk ilda 2.51 m/s    AV mn grad 13.0 mmHg    LVOT diam 2.22 cm    LV biplane EF 48 %    MV avg E/e' ratio 14.85     LA vol index A/L 50.7 ml/m2    Tricuspid annular plane systolic excursion 1.5 cm    RV free wall pk S' 8.05 cm/s    LV GLS 12.9 %    LVIDd 4.50 cm    RVSP 57.3 mmHg    Aortic Valve Area by Continuity of VTI 1.62 cm2    Aortic Valve Area by Continuity of Peak Velocity 1.35 cm2    AV pk grad 25.2 mmHg    LV A4C EF 35.9      Transthoracic Echo (TTE) Complete    Result Date: 3/19/2024   [facility], [address] [place] [address]                Tel [phone] and Fax [phone] TRANSTHORACIC ECHOCARDIOGRAM REPORT  Patient Name:      MAGNUS Alegre Physician:    [id number] Melissa Villalba MD Study Date:        3/19/2024            Ordering Provider:    [id number] JOSH PACK MRN/PID:           [id number]             Fellow: Accession#:        [id number]         Nurse: Date of Birth/Age: 1945 / 78 years Sonographer:          Abilio Marhsall                                                               CHANEL Gender:            M                    Additional Staff:     [id number] Josh Pack MD Height:            160.02 cm            Admit Date:           3/19/2024 Weight:            65.77 kg             Admission Status:     Inpatient - STAT BSA / BMI:         1.69 m2 / 25.69      Encounter#:           [id number]                    kg/m2                                          Department Location:  [facility] Blood Pressure: 89 /67 mmHg Study Type:    TRANSTHORACIC ECHO (TTE) COMPLETE Diagnosis/ICD: Chronic diastolic (congestive) heart failure (CHF)-I50.32 Indication:    amyloidosis, shock CPT Code:      Echo Complete w Full Doppler-18165; Myocardial Strain                Imaging-[id number] Patient History: Pertinent History: Slow afib, parkinsons, CKD III, s/p left lower lobe                    lobectomy, amyloidosis. Study Detail: The following Echo studies were performed: 2D, M-Mode, Doppler,               color flow and Strain. Technically challenging study due to               patient lying in supine position. Agitated saline used as a               contrast agent for intraseptal flow evaluation.  PHYSICIAN INTERPRETATION: Left Ventricle: The left ventricular systolic function is low normal, with an estimated ejection fraction of 50-55%. The patient is in atrial fibrillation which may influence the estimate of left ventricular function and transvalvular flows. There are no regional wall motion abnormalities. The left ventricular cavity size is normal. There is severe concentric left ventricular hypertrophy. Left Ventricular Global Longitudinal Strain - 12.9 %. Spectral Doppler shows an abnormal pattern of left ventricular diastolic filling. There are elevated left atrial and left ventricular end diastolic pressures. Apical sparing in the apex is noted with impairment of GLS consistent with Amyloid. Left Atrium: The left atrium is severely dilated. A bubble study using agitated saline was performed. Bubble study is positive. A large PFO (> 20 bubbles) was demonstrated. There is evidence of an atrial septal aneurysm. Right Ventricle: The right ventricle is mildly enlarged. There is mildly increased right ventriclar wall thickness. There is mildly reduced right ventricular systolic function. Right Atrium: The right atrium is moderately dilated. Aortic Valve: The aortic  valve appears structurally normal. There is mild aortic valve cusp calcification. There is mild aortic valve regurgitation. The peak instantaneous gradient of the aortic valve is 25.2 mmHg. The mean gradient of the aortic valve is 13.0 mmHg. PHT is < 400 ms because of elevated LV filling pressures. Mitral Valve: The mitral valve is normal in structure. There is mild mitral valve regurgitation which is anteriorly directed. Tricuspid Valve: The tricuspid valve is structurally normal. There is moderate tricuspid regurgitation. The Doppler estimated RVSP is moderately elevated at 57.2 mmHg. Pulmonic Valve: The pulmonic valve is structurally normal. There is trace pulmonic valve regurgitation. Pericardium: There is a small pericardial effusion. Pericardial effusion is localized around RA and LV lateral wall. Aorta: The aortic root is normal. Systemic Veins: The inferior vena cava appears dilated. There is less than 50% IVC collapse with inspiration. In comparison to the previous echocardiogram(s): Compared with study from 12/22/2016, there is progression of LVH and RVH. Now it is more consistent with cardiac amyloid.  CONCLUSIONS:  1. Left ventricular systolic function is low normal with a 50-55% estimated ejection fraction.  2. Apical sparing in the apex is noted with impairment of GLS consistent with Amyloid.  3. Spectral Doppler shows an abnormal pattern of left ventricular diastolic filling.  4. There are elevated left atrial and left ventricular end diastolic pressures.  5. There is severe concentric left ventricular hypertrophy.  6. There is mildly reduced right ventricular systolic function.  7. The left atrium is severely dilated.  8. The right atrium is moderately dilated.  9. Pericardial effusion is localized around RA and LV lateral wall. 10. Moderate tricuspid regurgitation visualized. 11. Moderately elevated right ventricular systolic pressure. 12. Mild aortic valve regurgitation. 13. A bubble study using  agitated saline was performed. Bubble study is positive. A large PFO (> 20 bubbles) was demonstrated. 14. Atrial septal aneurysm present. 15. The patient is in atrial fibrillation which may influence the estimate of left ventricular function and transvalvular flows. 16. Compared with study from 12/22/2016, there is progression of LVH and RVH. Now it is more consistent with cardiac amyloid. QUANTITATIVE DATA SUMMARY: 2D MEASUREMENTS:                           Normal Ranges: Ao Root d:     3.30 cm    (2.0-3.7cm) LAs:           4.70 cm    (2.7-4.0cm) IVSd:          1.70 cm    (0.6-1.1cm) LVPWd:         1.70 cm    (0.6-1.1cm) LVIDd:         4.50 cm    (3.9-5.9cm) LVIDs:         3.60 cm LV Mass Index: 198.6 g/m2 LV % FS        20.0 % LA VOLUME:                               Normal Ranges: LA Vol A4C:        102.4 ml   (22+/-6mL/m2) LA Vol A2C:        70.2 ml LA Vol BP:         85.5 ml LA Vol Index A4C:  60.7ml/m2 LA Vol Index A2C:  41.6 ml/m2 LA Vol Index BP:   50.7 ml/m2 LA Area A4C:       28.5 cm2 LA Area A2C:       23.4 cm2 LA Major Axis A4C: 6.7 cm LA Major Axis A2C: 6.6 cm LA Volume Index:   50.6 ml/m2 AORTA MEASUREMENTS:                    Normal Ranges: Asc Ao, d: 3.80 cm (2.1-3.4cm) LV SYSTOLIC FUNCTION BY 2D PLANIMETRY (MOD):                                          Normal Ranges: EF-A4C View:                      35.9 % (>=55%) EF-A2C View:                      59.4 % EF-Biplane:                       48.0 % Global Longitudinal Strain (GLS): 12.9 % LV DIASTOLIC FUNCTION:                        Normal Ranges: MV Peak E:    1.24 m/s (0.7-1.2 m/s) MV e'         0.08 m/s (>8.0) MV lateral e' 0.11 m/s MV medial e'  0.06 m/s E/e' Ratio:   14.85    (<8.0) a'            0.04 m/s MV DT:        164 msec (150-240 msec) MITRAL VALVE:                 Normal Ranges: MV DT: 164 msec (150-240msec) MITRAL INSUFFICIENCY:                          Normal Ranges: PISA Radius:  0.5 cm MR Alias Mark: 38.5 cm/s MR Flow Rt:   60.48 ml/s  AORTIC VALVE:                                    Normal Ranges: AoV Vmax:                2.51 m/s  (<=1.7m/s) AoV Peak P.2 mmHg (<20mmHg) AoV Mean P.0 mmHg (1.7-11.5mmHg) LVOT Max Mark:            0.88 m/s  (<=1.1m/s) AoV VTI:                 39.30 cm  (18-25cm) LVOT VTI:                16.40 cm LVOT Diameter:           2.22 cm   (1.8-2.4cm) AoV Area, VTI:           1.62 cm2  (2.5-5.5cm2) AoV Area,Vmax:           1.35 cm2  (2.5-4.5cm2) AoV Dimensionless Index: 0.42  RIGHT VENTRICLE: RV Basal 4.00 cm RV Mid   2.30 cm RV Major 7.9 cm TAPSE:   14.6 mm RV s'    0.08 m/s TRICUSPID VALVE/RVSP:                             Normal Ranges: Peak TR Velocity: 3.25 m/s RV Syst Pressure: 57.2 mmHg (< 30mmHg) IVC Diam:         2.90 cm PULMONIC VALVE:                      Normal Ranges: PV Max Mark: 0.8 m/s  (0.6-0.9m/s) PV Max P.7 mmHg  [id number] Melissa Villalba MD Electronically signed on 3/19/2024 at 5:32:00 PM  ** Final **     CT chest abdomen pelvis wo IV contrast    Result Date: 3/19/2024  STUDY: CT Chest, Abdomen, and Pelvis without IV Contrast; 2024 at 4:29 PM INDICATION: Shock, unclear etiology. COMPARISON: XR chest of same date, 24. CT chest 24, 23, 22. ACCESSION NUMBER(S): [id number] ORDERING CLINICIAN: HOLLY RUEDA TECHNIQUE: CT of the chest, abdomen, and pelvis was performed.  Contiguous axial images were obtained at 3 mm slice thickness through the chest, abdomen, and pelvis.  Coronal and sagittal reconstructions at 3 mm slice thickness were performed.  No intravenous contrast was administered.  FINDINGS: Please note that the evaluation of vessels, lymph nodes and organs is limited without intravenous contrast. CHEST: MEDIASTINUM: The heart is enlarged and has a small pericardial effusion.  Coronary artery calcifications are identified.  LUNGS/PLEURA: There is small bilateral pleural effusions with associated atelectasis.  The airways are patent. Lungs  are clear without consolidation, interstitial disease, or suspicious nodules. LYMPH NODES: Thoracic lymph nodes are not enlarged. ABDOMEN:  LIVER: No hepatomegaly.  The liver has an irregular capsule.  Normal attenuation.  BILE DUCTS: No intrahepatic or extrahepatic biliary ductal dilatation.  GALLBLADDER: The gallbladder is unremarkable. STOMACH: No abnormalities identified.  PANCREAS: No masses or ductal dilatation.  SPLEEN: No splenomegaly or focal splenic lesion.  ADRENAL GLANDS: No thickening or nodules.  KIDNEYS AND URETERS: Kidneys are normal in size and location.  There is a nonobstructing 4 mm stone in the left kidney. There is a stable hyperdense cyst in the right kidney.  PELVIS:  BLADDER: No abnormalities identified.  REPRODUCTIVE ORGANS: No abnormalities identified.  BOWEL: No abnormalities identified. There is a large amount of stool in the colon.  VESSELS: No abnormalities identified.  Abdominal aorta is normal in caliber.  PERITONEUM/RETROPERITONEUM/LYMPH NODES: No free fluid.  No pneumoperitoneum. There is mild edema throughout the mesentery and subcutaneous fat. No lymphadenopathy.  ABDOMINAL WALL: No abnormalities identified. SOFT TISSUES: No abnormalities identified.  BONES: No acute fracture or aggressive osseous lesion.    CHEST CT: There is cardiomegaly with a small pericardial effusion. There are small bilateral pleural effusions with associated atelectasis. CT A/P: There is an irregular capsule in the liver which may represent cirrhosis. There is mild edema throughout the mesentery and subcutaneous fat. Large amount of stool in the colon. Stable hyperdense cyst in the right kidney. Nonobstructing 4 mm stone in the left kidney. Signed by Eulogio Beauchamp MD    XR chest 1 view    Result Date: 3/19/2024  STUDY: Chest Radiograph;  3/19/2024 at 2:31 PM. INDICATION: Sepsis workup. COMPARISON: CT chest 3/6/2024. ACCESSION NUMBER(S): [id number] ORDERING CLINICIAN: HOLLY RUEDA TECHNIQUE:  Frontal  chest was obtained at 14:31 hours. FINDINGS: CARDIOMEDIASTINAL SILHOUETTE: Cardiomediastinal silhouette is normal in size and configuration.  LUNGS: There is apparent residual left basilar pneumothorax which appears similar to prior study.  Lungs demonstrate chronic changes.  There is no acute airspace opacity.  ABDOMEN: No remarkable upper abdominal findings.  BONES: No acute osseous changes.    Left basilar pneumothorax appears unchanged compared with prior study.  Cardiomegaly appears stable.  Chronic changes.  No acute process. Signed by Jeison Hernandez, DO         Assessment/Plan   Principal Problem:    Shock (CMS/HCC)  Active Problems:    Physical deconditioning    Atrial fibrillation (CMS/HCC)    Parkinson's disease without dyskinesia or fluctuating manifestations    Heart failure with preserved ejection fraction (CMS/[facility])    Mr. Brady Ortega is an 78 year old male with PMHX of Parkinsonism, CKD stage 3 (baseline 1.3-1.6), Afib on Eliquis, HTN, CAD, Hx of lung Ca s/p LLL lobectomy, Chronic Diastolic HF 2/2 suspected cardiac amyloidosis with no workup, and recent admission to [facility] 2/16-2/22 for Sepsis 2/2 cellulitis of RLL who presented to the [facility] ED from home with hypotension and lethargy. UA concerning for UTI with elevated WBC.     Plan:     #Neuro  #Parkinsonism  - Will resume home carbidopa/levodopa 25/100 QID    #Acute Encephalopathy  - Likely in the setting of UTI  - Patient A&Ox4 on exam on arrival to CICU     #CV  #Shock  #Hx of HFpEF   #HTN  #CAD  #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   - Differential: Strongly suspect Septic shock 2/2 UTI vs hypovolemic vs cardiogenic   - trop 237,   - UA concerning for UTI, ucx and blood cultures collected  - WBC 20.5, afebrile   - lactate improved from 2.3à2.0 after 1L LR   - Appears dry on exam with clear lungs   - Patient took normal home dose of Torsemide 40 mg this am- will usually alternate 20 and 40 mg of Torsemide each day   - Has not been  taking metolozone at home per wife   - Warm on exam, bedside POCUS with normal low normal EF  - Lactate improved with 1L LR  - Does not appears to be in low output state   - Has had SPEP, UPEP, and kappa jessica checked outpt and ruled out AL amyloid, would be reasonable to pursue outpatient PYP scan   - Was given Vanc and Zosyn in the ED, will continue for now and deescalate based on culture results and sensitivities.     #Afib  - HR well controlled  - Continue Eliquis 2.5 mg BID     #Pulm  #Hx of Lung cancer s/p LLL lobectomy in 2018  - Residual PTX seen on CT and CXR  - Stable and satting well on RA     #Renal  #BEKA on CKD stage 3  #BPH  - Baseline Cr 1.3-1.6  - Cr elevated at 2.34 on arrival   - Was started on Flomax  and Cialis   - Will place sherwood for urinary retention   - Will monitor kidney function closely.   - Will send urine lytes       #GI  #Chronic constipation   -  Will start Miralax scheduled     #ID  #Septic Shock 2/2 UTI as above     #Endo  #Hypothyroidism  -Continue home levothyroxine  - TSH 7.36, checking free t4    #Hyperparathyroidism  - Was recently told to stop cincalcet per patients wife    F: Got 1L of LR in ED, cautious given HFpEF  E: As needed  N: Cardiac diet  A: PIVs  DVT ppx: Home Eliquis  GI ppx: PPI  Abx: Vanc/Zosyn  O2: RA  Gtts: Levo   Code Status: Full Code confirmed on arrival   NOK: Elsa Ortega [phone]    Patient to be seen and staffed with attending in the am, recommendations not final until note signed.    David Monet MD

## 2024-03-19 NOTE — TELEPHONE ENCOUNTER
Returned call to patient's wife. Patient states home care nurse was out and patient's BP and oxygen level was low. 911 was called and patient transported to [facility]. Dr. Hankins notified.    Radha Nicole RN

## 2024-03-20 ENCOUNTER — APPOINTMENT (OUTPATIENT)
Dept: RADIOLOGY | Facility: HOSPITAL | Age: 79
DRG: 871 | End: 2024-03-20
Payer: COMMERCIAL

## 2024-03-20 LAB
ALBUMIN SERPL BCP-MCNC: 2.9 G/DL (ref 3.4–5)
ANION GAP BLDA CALCULATED.4IONS-SCNC: 9 MMO/L (ref 10–25)
ANION GAP SERPL CALC-SCNC: 15 MMOL/L (ref 10–20)
BASE EXCESS BLDA CALC-SCNC: 0.7 MMOL/L (ref -2–3)
BASOPHILS # BLD AUTO: 0.05 X10*3/UL (ref 0–0.1)
BASOPHILS NFR BLD AUTO: 0.3 %
BODY TEMPERATURE: 37 DEGREES CELSIUS
BUN SERPL-MCNC: 52 MG/DL (ref 6–23)
CA-I BLDA-SCNC: 0.98 MMOL/L (ref 1.1–1.33)
CALCIUM SERPL-MCNC: 7.7 MG/DL (ref 8.6–10.6)
CHLORIDE BLDA-SCNC: 101 MMOL/L (ref 98–107)
CHLORIDE SERPL-SCNC: 98 MMOL/L (ref 98–107)
CO2 SERPL-SCNC: 25 MMOL/L (ref 21–32)
CREAT SERPL-MCNC: 2.45 MG/DL (ref 0.5–1.3)
EGFRCR SERPLBLD CKD-EPI 2021: 26 ML/MIN/1.73M*2
EOSINOPHIL # BLD AUTO: 0.04 X10*3/UL (ref 0–0.4)
EOSINOPHIL NFR BLD AUTO: 0.2 %
ERYTHROCYTE [DISTWIDTH] IN BLOOD BY AUTOMATED COUNT: 15 % (ref 11.5–14.5)
GLUCOSE BLDA-MCNC: 122 MG/DL (ref 74–99)
GLUCOSE SERPL-MCNC: 97 MG/DL (ref 74–99)
HCO3 BLDA-SCNC: 22.9 MMOL/L (ref 22–26)
HCT VFR BLD AUTO: 28.8 % (ref 41–52)
HCT VFR BLD EST: 31 % (ref 41–52)
HGB BLD-MCNC: 10 G/DL (ref 13.5–17.5)
HGB BLDA-MCNC: 10.2 G/DL (ref 13.5–17.5)
IMM GRANULOCYTES # BLD AUTO: 0.14 X10*3/UL (ref 0–0.5)
IMM GRANULOCYTES NFR BLD AUTO: 0.7 % (ref 0–0.9)
INHALED O2 CONCENTRATION: 21 %
LACTATE BLDA-SCNC: 1 MMOL/L (ref 0.4–2)
LYMPHOCYTES # BLD AUTO: 0.77 X10*3/UL (ref 0.8–3)
LYMPHOCYTES NFR BLD AUTO: 3.9 %
MAGNESIUM SERPL-MCNC: 1.53 MG/DL (ref 1.6–2.4)
MAGNESIUM SERPL-MCNC: 1.97 MG/DL (ref 1.6–2.4)
MCH RBC QN AUTO: 28.7 PG (ref 26–34)
MCHC RBC AUTO-ENTMCNC: 34.7 G/DL (ref 32–36)
MCV RBC AUTO: 83 FL (ref 80–100)
MONOCYTES # BLD AUTO: 1.15 X10*3/UL (ref 0.05–0.8)
MONOCYTES NFR BLD AUTO: 5.8 %
NEUTROPHILS # BLD AUTO: 17.72 X10*3/UL (ref 1.6–5.5)
NEUTROPHILS NFR BLD AUTO: 89.1 %
NRBC BLD-RTO: 0 /100 WBCS (ref 0–0)
OXYHGB MFR BLDA: 94.9 % (ref 94–98)
PCO2 BLDA: 28 MM HG (ref 38–42)
PH BLDA: 7.52 PH (ref 7.38–7.42)
PHOSPHATE SERPL-MCNC: 2.1 MG/DL (ref 2.5–4.9)
PLATELET # BLD AUTO: 227 X10*3/UL (ref 150–450)
PO2 BLDA: 77 MM HG (ref 85–95)
POTASSIUM BLDA-SCNC: 3.8 MMOL/L (ref 3.5–5.3)
POTASSIUM SERPL-SCNC: 3.6 MMOL/L (ref 3.5–5.3)
RBC # BLD AUTO: 3.48 X10*6/UL (ref 4.5–5.9)
SAO2 % BLDA: 98 % (ref 94–100)
SODIUM BLDA-SCNC: 129 MMOL/L (ref 136–145)
SODIUM SERPL-SCNC: 134 MMOL/L (ref 136–145)
WBC # BLD AUTO: 19.9 X10*3/UL (ref 4.4–11.3)

## 2024-03-20 PROCEDURE — 37799 UNLISTED PX VASCULAR SURGERY: CPT

## 2024-03-20 PROCEDURE — 85025 COMPLETE CBC W/AUTO DIFF WBC: CPT

## 2024-03-20 PROCEDURE — 2500000004 HC RX 250 GENERAL PHARMACY W/ HCPCS (ALT 636 FOR OP/ED)

## 2024-03-20 PROCEDURE — 97530 THERAPEUTIC ACTIVITIES: CPT | Mod: GO

## 2024-03-20 PROCEDURE — 2020000001 HC ICU ROOM DAILY

## 2024-03-20 PROCEDURE — 97530 THERAPEUTIC ACTIVITIES: CPT | Mod: GP

## 2024-03-20 PROCEDURE — 2500000002 HC RX 250 W HCPCS SELF ADMINISTERED DRUGS (ALT 637 FOR MEDICARE OP, ALT 636 FOR OP/ED)

## 2024-03-20 PROCEDURE — 2500000002 HC RX 250 W HCPCS SELF ADMINISTERED DRUGS (ALT 637 FOR MEDICARE OP, ALT 636 FOR OP/ED): Performed by: STUDENT IN AN ORGANIZED HEALTH CARE EDUCATION/TRAINING PROGRAM

## 2024-03-20 PROCEDURE — 97166 OT EVAL MOD COMPLEX 45 MIN: CPT | Mod: GO

## 2024-03-20 PROCEDURE — 71045 X-RAY EXAM CHEST 1 VIEW: CPT | Performed by: RADIOLOGY

## 2024-03-20 PROCEDURE — 2500000005 HC RX 250 GENERAL PHARMACY W/O HCPCS

## 2024-03-20 PROCEDURE — 2500000001 HC RX 250 WO HCPCS SELF ADMINISTERED DRUGS (ALT 637 FOR MEDICARE OP)

## 2024-03-20 PROCEDURE — 99291 CRITICAL CARE FIRST HOUR: CPT | Performed by: INTERNAL MEDICINE

## 2024-03-20 PROCEDURE — 83735 ASSAY OF MAGNESIUM: CPT

## 2024-03-20 PROCEDURE — 97162 PT EVAL MOD COMPLEX 30 MIN: CPT | Mod: GP

## 2024-03-20 PROCEDURE — 84132 ASSAY OF SERUM POTASSIUM: CPT

## 2024-03-20 PROCEDURE — 71045 X-RAY EXAM CHEST 1 VIEW: CPT

## 2024-03-20 PROCEDURE — 2500000004 HC RX 250 GENERAL PHARMACY W/ HCPCS (ALT 636 FOR OP/ED): Performed by: STUDENT IN AN ORGANIZED HEALTH CARE EDUCATION/TRAINING PROGRAM

## 2024-03-20 RX ORDER — MAGNESIUM SULFATE HEPTAHYDRATE 40 MG/ML
2 INJECTION, SOLUTION INTRAVENOUS ONCE
Status: COMPLETED | OUTPATIENT
Start: 2024-03-20 | End: 2024-03-20

## 2024-03-20 RX ORDER — POLYETHYLENE GLYCOL 3350 17 G/17G
17 POWDER, FOR SOLUTION ORAL 2 TIMES DAILY
Status: DISCONTINUED | OUTPATIENT
Start: 2024-03-20 | End: 2024-03-25 | Stop reason: HOSPADM

## 2024-03-20 RX ORDER — TAMSULOSIN HYDROCHLORIDE 0.4 MG/1
0.4 CAPSULE ORAL DAILY
Status: DISCONTINUED | OUTPATIENT
Start: 2024-03-20 | End: 2024-03-25 | Stop reason: HOSPADM

## 2024-03-20 RX ORDER — ACETAMINOPHEN 325 MG/1
650 TABLET ORAL EVERY 6 HOURS PRN
Status: DISCONTINUED | OUTPATIENT
Start: 2024-03-20 | End: 2024-03-25 | Stop reason: HOSPADM

## 2024-03-20 RX ADMIN — CARBIDOPA AND LEVODOPA 2 TABLET: 25; 100 TABLET ORAL at 18:44

## 2024-03-20 RX ADMIN — CARBIDOPA AND LEVODOPA 0.5 TABLET: 25; 100 TABLET ORAL at 20:09

## 2024-03-20 RX ADMIN — IPRATROPIUM BROMIDE 2 SPRAY: 21 SPRAY, METERED NASAL at 20:08

## 2024-03-20 RX ADMIN — POLYETHYLENE GLYCOL 3350 17 G: 17 POWDER, FOR SOLUTION ORAL at 20:08

## 2024-03-20 RX ADMIN — TAMSULOSIN HYDROCHLORIDE 0.4 MG: 0.4 CAPSULE ORAL at 13:59

## 2024-03-20 RX ADMIN — CARBIDOPA AND LEVODOPA 2 TABLET: 25; 100 TABLET ORAL at 14:00

## 2024-03-20 RX ADMIN — MAGNESIUM SULFATE HEPTAHYDRATE 2 G: 40 INJECTION, SOLUTION INTRAVENOUS at 03:02

## 2024-03-20 RX ADMIN — ACETAMINOPHEN 650 MG: 325 TABLET ORAL at 01:39

## 2024-03-20 RX ADMIN — PANTOPRAZOLE SODIUM 40 MG: 40 TABLET, DELAYED RELEASE ORAL at 08:00

## 2024-03-20 RX ADMIN — PIPERACILLIN SODIUM AND TAZOBACTAM SODIUM 2.25 G: 2; .25 INJECTION, SOLUTION INTRAVENOUS at 13:00

## 2024-03-20 RX ADMIN — POLYETHYLENE GLYCOL 3350 17 G: 17 POWDER, FOR SOLUTION ORAL at 08:01

## 2024-03-20 RX ADMIN — IPRATROPIUM BROMIDE 2 SPRAY: 21 SPRAY, METERED NASAL at 08:02

## 2024-03-20 RX ADMIN — AZELASTINE HYDROCHLORIDE 2 SPRAY: 137 SPRAY, METERED NASAL at 08:02

## 2024-03-20 RX ADMIN — LIDOCAINE 1 PATCH: 4 PATCH TOPICAL at 08:01

## 2024-03-20 RX ADMIN — AZELASTINE HYDROCHLORIDE 2 SPRAY: 137 SPRAY, METERED NASAL at 20:08

## 2024-03-20 RX ADMIN — PIPERACILLIN SODIUM AND TAZOBACTAM SODIUM 2.25 G: 2; .25 INJECTION, SOLUTION INTRAVENOUS at 05:30

## 2024-03-20 RX ADMIN — APIXABAN 2.5 MG: 2.5 TABLET, FILM COATED ORAL at 20:08

## 2024-03-20 RX ADMIN — APIXABAN 2.5 MG: 2.5 TABLET, FILM COATED ORAL at 08:01

## 2024-03-20 RX ADMIN — CARBIDOPA AND LEVODOPA 2 TABLET: 25; 100 TABLET ORAL at 10:53

## 2024-03-20 RX ADMIN — CARBIDOPA AND LEVODOPA 2 TABLET: 25; 100 TABLET ORAL at 08:00

## 2024-03-20 RX ADMIN — ATORVASTATIN CALCIUM 20 MG: 20 TABLET, FILM COATED ORAL at 20:08

## 2024-03-20 RX ADMIN — LEVOTHYROXINE SODIUM 88 MCG: 0.09 TABLET ORAL at 08:00

## 2024-03-20 RX ADMIN — PIPERACILLIN SODIUM AND TAZOBACTAM SODIUM 2.25 G: 2; .25 INJECTION, SOLUTION INTRAVENOUS at 18:44

## 2024-03-20 ASSESSMENT — ACTIVITIES OF DAILY LIVING (ADL)
BATHING_ASSISTANCE: MODERATE
ADL_ASSISTANCE: NEEDS ASSISTANCE

## 2024-03-20 ASSESSMENT — PAIN - FUNCTIONAL ASSESSMENT
PAIN_FUNCTIONAL_ASSESSMENT: 0-10

## 2024-03-20 ASSESSMENT — COGNITIVE AND FUNCTIONAL STATUS - GENERAL
STANDING UP FROM CHAIR USING ARMS: TOTAL
EATING MEALS: A LITTLE
CLIMB 3 TO 5 STEPS WITH RAILING: TOTAL
TURNING FROM BACK TO SIDE WHILE IN FLAT BAD: TOTAL
HELP NEEDED FOR BATHING: A LOT
WALKING IN HOSPITAL ROOM: TOTAL
MOBILITY SCORE: 6
DAILY ACTIVITIY SCORE: 15
MOVING FROM LYING ON BACK TO SITTING ON SIDE OF FLAT BED WITH BEDRAILS: TOTAL
DRESSING REGULAR LOWER BODY CLOTHING: A LOT
PERSONAL GROOMING: A LITTLE
DRESSING REGULAR UPPER BODY CLOTHING: A LITTLE
TOILETING: A LOT
MOVING TO AND FROM BED TO CHAIR: TOTAL

## 2024-03-20 ASSESSMENT — PAIN SCALES - GENERAL
PAINLEVEL_OUTOF10: 0 - NO PAIN

## 2024-03-20 NOTE — HOSPITAL COURSE
Patient admitted to the CICU for septic shock on a poor substrate given cardiac amyloid. Patient started on broad spectrum Abx (vanc, pip/tazo) and later vancomycin was stopped d/t gram negative bacteria on urine.  Patient has been off of pressors since 3/21.  Urine culture speciated for pansensitive Pseudomonas.  Blood cultures returned negative.  Zosyn (3/19-3/22) was continued and changed to p.o. ciprofloxacin to complete his course for complicated UTI. Palliative care was consulted for ongoing goals of care and home-going plans.  Family wishing patient to go home after discharge.  Urology evaluated the patient and despite recurrent infections, the patient will be discharged with a Denny catheter with outpatient follow-up. Patient transferred to the floor and received oral antibiotics. He was given one dose of torsemide 20 mg for diuresis given peripheral edema. He had noted improvement in his kidney function. The patient was noted to have a low phosphorus level, and he was given a dose prior to discharge with outpatient follow-up.     To Do:  [ ] Renal function panel in 1 week.     Follow-up:  [ ] PCP for post-hospitalization follow-up. Please note, Jardiance held at discharge given urinary tract infections.   [ ] Cardiology follow-up for further work-up of amyloidosis  [ ] Urology follow-up for further urinary care.   [ ] Outpatient follow-up with palliative care.

## 2024-03-20 NOTE — PROGRESS NOTES
Vancomycin Dosing by Pharmacy- Cessation of Therapy    Consult to pharmacy for vancomycin dosing has been discontinued by the prescriber, pharmacy will sign off at this time.    Please call pharmacy if there are further questions or re-enter a consult if vancomycin is resumed.     Cathy Beasley [facility]

## 2024-03-20 NOTE — PROCEDURES
Arterial Line Insertion    Date/Time: 3/19/2024 9:49 PM    Performed by: Mike Houston MD  Authorized by: Rigoberto Hernández MD    Consent:     Consent obtained:  Written    Consent given by:  Spouse    Risks, benefits, and alternatives were discussed: yes      Risks discussed:  Bleeding, repeat procedure, infection and pain  Universal protocol:     Procedure explained and questions answered to patient or proxy's satisfaction: yes      Relevant documents present and verified: yes      Test results available: yes      Imaging studies available: yes      Required blood products, implants, devices, and special equipment available: yes      Site/side marked: yes      Immediately prior to procedure, a time out was called: yes      Patient identity confirmed:  Verbally with patient and hospital-assigned identification number  Indications:     Indications: hemodynamic monitoring    Pre-procedure details:     Skin preparation:  Chlorhexidine  Sedation:     Sedation type:  None  Anesthesia:     Anesthesia method:  Local infiltration    Local anesthetic:  Lidocaine 1% w/o epi  Procedure details:     Location:  L radial    Placement technique:  Seldinger    Transducer: waveform confirmed    Post-procedure details:     Post-procedure:  Sutured and sterile dressing applied    Procedure completion:  Tolerated

## 2024-03-20 NOTE — PROGRESS NOTES
Physical Therapy    Physical Therapy Evaluation & Treatment    Patient Name: Brady Ortega  MRN: [id number]  Today's Date: 3/20/2024   Time Calculation  Start Time: 1150  Stop Time: 1222  Time Calculation (min): 32 min    Assessment/Plan   PT Assessment  PT Assessment Results: Decreased strength, Decreased endurance, Impaired balance, Decreased mobility, Decreased coordination  Rehab Prognosis: Excellent  End of Session Communication: Bedside nurse  End of Session Patient Position: Bed, 3 rail up, Alarm off, not on at start of session   IP OR SWING BED PT PLAN  Inpatient or Swing Bed: Inpatient  PT Plan  Treatment/Interventions: Bed mobility, Transfer training, Gait training, Stair training, Balance training, Neuromuscular re-education, Strengthening, Endurance training, Therapeutic exercise, Therapeutic activity  PT Plan: Skilled PT  PT Frequency: 4 times per week  PT Discharge Recommendations: Moderate intensity level of continued care  PT Recommended Transfer Status: Assist x2  PT - OK to Discharge: Yes    Subjective     General Visit Information:  General  Reason for Referral: 77 y/o male admitted for hypotension and lethargy being treated for urosepsis shock  Past Medical History Relevant to Rehab: Parkinsonism, CKD stage 3 (baseline 1.3-1.6), Afib on Eliquis, HTN, CAD, Hx of lung Ca s/p LLL lobectomy, HFpEF (EF 50-55% March/2024) 2/2 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 Arterial line, Denny, Levo .08 mcg, Telemetry.    Home Living:  Home Living  Type of Home: House  Lives With:  (Wife)  Home Adaptive Equipment: Cane, Walker rolling or standard  Home Layout: Stairs to alternate level with  rails, Bed/bath upstairs  Alternate Level Stairs-Rails:  (one side)  Alternate Level Stairs-Number of Steps: 12  Home Access: Stairs to enter with rails  Entrance Stairs-Rails:  (one side)  Entrance Stairs-Number of Steps: 3  Bathroom Shower/Tub: Walk-in shower  Bathroom Equipment: Grab bars in shower, Shower chair with back    Prior Level of Function:  Prior Function Per Pt/Caregiver Report  Level of [place]: Independent with ADLs and functional transfers  Receives Help From: Family  Ambulatory Assistance: Independent (At baseline (prior to October hospitalizations)- community ambulator, using cane. (+) fall- 1 in the last 3 weeks with posterior LOB. Has recently using a FWW since being in/out of the hospital. Has R drop foot (Chronic) and wears a brace for R ankle.)  Vocational: Retired  Leisure: Likes photography. Normally very active at [employer]- doing Alv[employer], cycling classes  Prior Function Comments: Pt has just returned home on Saturday from SNF    Precautions:  Precautions  Medical Precautions: Cardiac precautions, Fall precautions    Vital Signs:  Vital Signs  Heart Rate:  (PRE: 86; POST: 96)  SpO2:  (PRE: 95%; POST: 97%)  BP:  (PRE:117/48, POST: 121/55)    Objective     Pain:  Pain Assessment  Pain Assessment: 0-10  Pain Score: 0 - No pain    Cognition:  Cognition  Overall Cognitive Status: Within Functional Limits  Arousal/Alertness: Appropriate responses to stimuli  Orientation Level: Oriented X4  Following Commands: Follows all commands and directions without difficulty    General Assessments:   Activity Tolerance  Early Mobility/Exercise Safety Screen: Proceed with mobilization - No exclusion criteria met    Sensation  Light Touch:  (intact sensation in B LEs)    Strength  Strength Comments: R ankle DF 0/5, R ankle PF 2/5, R LE otherwise 3/5, L LE grossly 3/5.  Strength  Strength Comments: R ankle DF 0/5, R ankle PF 2/5, R LE otherwise 3/5, L LE grossly 3/5.    Static Sitting Balance  Static  Sitting-Balance Support: No upper extremity supported, Feet supported  Static Sitting-Level of Assistance: Contact guard (cues for anterior lean)  Dynamic Sitting Balance  Dynamic Sitting-Balance Support: No upper extremity supported, Feet supported  Dynamic Sitting-Comments: Tere-modAx1    Static Standing Balance  Static Standing-Balance Support: Bilateral upper extremity supported  Static Standing-Level of Assistance: Moderate assistance (x2 person)  Dynamic Standing Balance  Dynamic Standing-Balance Support: Bilateral upper extremity supported  Dynamic Standing-Comments: ModAx2    Functional Assessments:  Bed Mobility  Bed Mobility: Yes  Bed Mobility 1  Bed Mobility 1: Supine to sitting  Level of Assistance 1: Moderate assistance (x2 person; cues for sequencing; increased time to compete)  Bed Mobility Comments 1: HOB elevated  Bed Mobility 2  Bed Mobility  2: Sitting to supine  Level of Assistance 2: Moderate assistance (x2 person; cues for sequencing)  Bed Mobility Comments 2: HOB flat    Transfers  Transfer: Yes  Transfer 1  Transfer From 1: Sit to  Transfer to 1: Stand  Technique 1: Sit to stand  Transfer Level of Assistance 1: Moderate assistance (x2 person; cues for sequencing; B HHA)  Trials/Comments 1: Pt retropulsive throughout  Transfers 2  Transfer From 2: Stand to  Transfer to 2: Sit  Technique 2: Stand to sit  Transfer Level of Assistance 2: Moderate assistance (x2 person; cues for sequencing; decreased eccetric control)    Extremity/Trunk Assessments:  RLE   RLE : Within Functional Limits  LLE   LLE : Within Functional Limits    Treatments:  Therapeutic Activity  Therapeutic Activity Performed: Yes  Therapeutic Activity 1: Pt sat EOB total of 10 mintues with Tere-modax1 with retropulsion for dynamic sitting. Cues for posture correction. Worked on x 5 mintues of fully upright posture with demonstration and paired pursed lip breathing. Tactile cues given.  Therapeutic Activity 2: Pt performed  sit<->stand with B HHA and modAx2 with pt having difficulty anteriorly shifting weight.    Outcome Measures:  [facility] Basic Mobility  Turning from your back to your side while in a flat bed without using bedrails: Total  Moving from lying on your back to sitting on the side of a flat bed without using bedrails: Total  Moving to and from bed to chair (including a wheelchair): Total  Standing up from a chair using your arms (e.g. wheelchair or bedside chair): Total  To walk in hospital room: Total  Climbing 3-5 steps with railing: Total  Basic Mobility - Total Score: 6    Confusion Assessment Method-ICU (CAM-ICU)  Feature 1: Acute Onset or Fluctuating Course: Negative  Overall CAM-ICU: Negative    FSS-ICU  Ambulation: Walks <50 feet with any assistance x1 or walks any distance with assistance x2 people  Rolling: Moderate assistance (performs 50 - 74% of task)  Sitting: Moderate assistance (performs 50 - 74% of task)  Transfer Sit-to-Stand: Total assistance (performs 25% or requires another person)  Transfer Supine-to-Sit: Total assistance (performs 25% or requires another person)  Total Score: 9    ICU Mobility Screen  Early Mobility/Exercise Safety Screen: Proceed with mobilization - No exclusion criteria met  E = Exercise and Early Mobility  Early Mobility/Exercise Safety Screen: Proceed with mobilization - No exclusion criteria met  Current Activity: Standing    Encounter Problems       Encounter Problems (Active)       Balance       Pt will be able to score >24 on Tinetti for low risk of falls (Progressing)       Start:  03/20/24    Expected End:  04/03/24               Mobility       Patient will ambulate >100 ft with FWW and CGA  (Progressing)       Start:  03/20/24    Expected End:  04/03/24            Patient will ascend and descend 12 stairs with handrail and cane with CGA (Progressing)       Start:  03/20/24    Expected End:  04/03/24               PT Transfers       Patient will perform bed mobility with  supervision (Progressing)       Start:  03/20/24    Expected End:  04/03/24            Patient will transfer sit to and from stand with LRAD and CGA (Progressing)       Start:  03/20/24    Expected End:  04/03/24                 Education Documentation  Mobility Training, taught by Enid Cisse PT at 3/20/2024  6:20 PM.  Learner: Patient  Readiness: Acceptance  Method: Explanation  Response: Verbalizes Understanding    Education Comments  No comments found.    Signed by Enid Cisse DPT

## 2024-03-20 NOTE — ED PROCEDURE NOTE
Procedure  Critical Care    Performed by: Rivera Loredo DO  Authorized by: Syed Ga MD    Critical care provider statement:     Critical care time (minutes):  45    Critical care time was exclusive of:  Separately billable procedures and treating other patients and teaching time    Critical care was necessary to treat or prevent imminent or life-threatening deterioration of the following conditions:  Sepsis, shock, dehydration and cardiac failure    Critical care was time spent personally by me on the following activities:  Blood draw for specimens, development of treatment plan with patient or surrogate, discussions with consultants, evaluation of patient's response to treatment, examination of patient, obtaining history from patient or surrogate, review of old charts, re-evaluation of patient's condition, pulse oximetry, ordering and review of radiographic studies, ordering and review of laboratory studies and ordering and performing treatments and interventions               Rivera Loredo DO  03/20/24 1512

## 2024-03-20 NOTE — PROGRESS NOTES
"Brady Ortega is a 78 y.o. male on day 1 of admission presenting with Shock (CMS/HCC).    Subjective     - Patient was briefly out of pressors yesterday (for around 2 hours), but unfortunately had to be re-started, so an a-line was placed and a central line was placed as well  - Patient spiked a fever of 38.7 Since he was already cultured, he was given acetaminophen  - Patient in the AM without complains. Asked for a warm blanket      Objective     Physical Exam  Constitutional:       Appearance: He is ill-appearing (chronically).   HENT:      Head: Normocephalic and atraumatic.   Eyes:      Extraocular Movements: Extraocular movements intact.      Pupils: Pupils are equal, round, and reactive to light.   Cardiovascular:      Rate and Rhythm: Normal rate. Rhythm irregular.      Heart sounds: Murmur heard.      No friction rub. No gallop.   Pulmonary:      Effort: Pulmonary effort is normal. No respiratory distress.      Breath sounds: No stridor. No wheezing or rales.   Abdominal:      General: Abdomen is flat. Bowel sounds are normal.      Palpations: Abdomen is soft.   Musculoskeletal:      Comments: Mild tremors noted. Rigidity on movement   Skin:     General: Skin is warm.      Capillary Refill: Capillary refill takes less than 2 seconds.   Neurological:      General: No focal deficit present.      Mental Status: He is alert.      Comments: Oriented x1         Last Recorded Vitals  Blood pressure 95/60, pulse 82, temperature 37.7 °C (99.9 °F), resp. rate 19, height 1.6 m (5' 3\"), weight 65.7 kg (144 lb 13.5 oz), SpO2 96 %.  Intake/Output last 3 Shifts:  I/O last 3 completed shifts:  In: 1963.7 (29.9 mL/kg) [P.O.:360; I.V.:153.7 (2.3 mL/kg); IV Piggyback:1450]  Out: 900 (13.7 mL/kg) [Urine:900 (0.4 mL/kg/hr)]  Weight: 65.7 kg     Relevant Results    Scheduled medications  apixaban, 2.5 mg, oral, BID  atorvastatin, 20 mg, oral, Daily  azelastine, 2 spray, Each Nostril, BID  carbidopa-levodopa, 0.5 tablet, " oral, Daily  carbidopa-levodopa, 2 tablet, oral, 4 times per day  ipratropium, 2 spray, Each Nostril, BID  levothyroxine, 88 mcg, oral, Daily before breakfast  lidocaine, 1 patch, transdermal, Daily  pantoprazole, 40 mg, oral, Daily before breakfast  perflutren lipid microspheres, 0.5-10 mL of dilution, intravenous, Once in imaging  piperacillin-tazobactam, 2.25 g, intravenous, q6h  polyethylene glycol, 17 g, oral, Daily  tamsulosin, 0.4 mg, oral, Daily      Continuous medications  norepinephrine, 0-3.3 mcg/kg/min, Last Rate: 0.08 mcg/kg/min (03/20/24 0910)      PRN medications  PRN medications: acetaminophen     Results for orders placed or performed during the hospital encounter of 03/19/24 (from the past 24 hour(s))   Blood Gas Venous Full Panel Unsolicited   Result Value Ref Range    POCT pH, Venous 7.44 (H) 7.33 - 7.43 pH    POCT pCO2, Venous 38 (L) 41 - 51 mm Hg    POCT pO2, Venous 45 35 - 45 mm Hg    POCT SO2, Venous 72 45 - 75 %    POCT Oxy Hemoglobin, Venous 69.9 45.0 - 75.0 %    POCT Hematocrit Calculated, Venous 31.0 (L) 41.0 - 52.0 %    POCT Sodium, Venous 131 (L) 136 - 145 mmol/L    POCT Potassium, Venous 3.5 3.5 - 5.3 mmol/L    POCT Chloride, Venous 98 98 - 107 mmol/L    POCT Ionized Calicum, Venous 1.08 (L) 1.10 - 1.33 mmol/L    POCT Glucose, Venous 116 (H) 74 - 99 mg/dL    POCT Lactate, Venous 2.3 (H) 0.4 - 2.0 mmol/L    POCT Base Excess, Venous 1.6 -2.0 - 3.0 mmol/L    POCT HCO3 Calculated, Venous 25.8 22.0 - 26.0 mmol/L    POCT Hemoglobin, Venous 10.3 (L) 13.5 - 17.5 g/dL    POCT Anion Gap, Venous 11.0 10.0 - 25.0 mmol/L    Patient Temperature 37.0 degrees Celsius   ECG 12 lead   Result Value Ref Range    Ventricular Rate 63 BPM    QRS Duration 136 ms    QT Interval 522 ms    QTC Calculation(Bazett) 534 ms    R Axis 137 degrees    T Axis 23 degrees    QRS Count 11 beats    Q Onset 214 ms    T Offset 475 ms    QTC Fredericia 530 ms   CBC and Auto Differential   Result Value Ref Range    WBC 20.5 (H)  4.4 - 11.3 x10*3/uL    nRBC 0.0 0.0 - 0.0 /100 WBCs    RBC 3.53 (L) 4.50 - 5.90 x10*6/uL    Hemoglobin 10.0 (L) 13.5 - 17.5 g/dL    Hematocrit 29.9 (L) 41.0 - 52.0 %    MCV 85 80 - 100 fL    MCH 28.3 26.0 - 34.0 pg    MCHC 33.4 32.0 - 36.0 g/dL    RDW 15.3 (H) 11.5 - 14.5 %    Platelets 250 150 - 450 x10*3/uL    Neutrophils % 88.8 40.0 - 80.0 %    Immature Granulocytes %, Automated 0.6 0.0 - 0.9 %    Lymphocytes % 3.8 13.0 - 44.0 %    Monocytes % 6.5 2.0 - 10.0 %    Eosinophils % 0.1 0.0 - 6.0 %    Basophils % 0.2 0.0 - 2.0 %    Neutrophils Absolute 18.22 (H) 1.60 - 5.50 x10*3/uL    Immature Granulocytes Absolute, Automated 0.12 0.00 - 0.50 x10*3/uL    Lymphocytes Absolute 0.78 (L) 0.80 - 3.00 x10*3/uL    Monocytes Absolute 1.34 (H) 0.05 - 0.80 x10*3/uL    Eosinophils Absolute 0.03 0.00 - 0.40 x10*3/uL    Basophils Absolute 0.04 0.00 - 0.10 x10*3/uL   Comprehensive Metabolic Panel   Result Value Ref Range    Glucose 94 74 - 99 mg/dL    Sodium 137 136 - 145 mmol/L    Potassium 3.5 3.5 - 5.3 mmol/L    Chloride 98 98 - 107 mmol/L    Bicarbonate 26 21 - 32 mmol/L    Anion Gap 17 10 - 20 mmol/L    Urea Nitrogen 55 (H) 6 - 23 mg/dL    Creatinine 2.34 (H) 0.50 - 1.30 mg/dL    eGFR 27 (L) >60 mL/min/1.73m*2    Calcium 8.0 (L) 8.6 - 10.6 mg/dL    Albumin 3.0 (L) 3.4 - 5.0 g/dL    Alkaline Phosphatase 104 33 - 136 U/L    Total Protein 5.5 (L) 6.4 - 8.2 g/dL    AST 16 9 - 39 U/L    Bilirubin, Total 1.0 0.0 - 1.2 mg/dL    ALT 3 (L) 10 - 52 U/L   Lactate   Result Value Ref Range    Lactate 2.5 (H) 0.4 - 2.0 mmol/L   Blood Culture    Specimen: Peripheral Venipuncture; Blood culture   Result Value Ref Range    Blood Culture Loaded on Instrument - Culture in progress    Blood Culture    Specimen: Peripheral Venipuncture; Blood culture   Result Value Ref Range    Blood Culture Loaded on Instrument - Culture in progress    Troponin I, High Sensitivity   Result Value Ref Range    Troponin I, High Sensitivity 237 (HH) 0 - 53 ng/L    B-Type Natriuretic Peptide   Result Value Ref Range     (H) 0 - 99 pg/mL   TSH with reflex to Free T4 if abnormal   Result Value Ref Range    Thyroid Stimulating Hormone 7.36 (H) 0.44 - 3.98 mIU/L   Thyroxine, Free   Result Value Ref Range    Thyroxine, Free 1.11 0.78 - 1.48 ng/dL   Magnesium   Result Value Ref Range    Magnesium 1.53 (L) 1.60 - 2.40 mg/dL   Coagulation Screen   Result Value Ref Range    Protime 37.0 (H) 9.8 - 12.8 seconds    INR 3.2 (H) 0.9 - 1.1    aPTT 39 (H) 27 - 38 seconds   Transthoracic Echo (TTE) Complete   Result Value Ref Range    AV pk ilda 2.51 m/s    AV mn grad 13.0 mmHg    LVOT diam 2.22 cm    LV biplane EF 48 %    MV avg E/e' ratio 14.85     LA vol index A/L 50.7 ml/m2    Tricuspid annular plane systolic excursion 1.5 cm    RV free wall pk S' 8.05 cm/s    LV GLS 12.9 %    LVIDd 4.50 cm    RVSP 57.3 mmHg    Aortic Valve Area by Continuity of VTI 1.62 cm2    Aortic Valve Area by Continuity of Peak Velocity 1.35 cm2    AV pk grad 25.2 mmHg    LV A4C EF 35.9    Electrocardiogram, 12-lead PRN ACS symptoms   Result Value Ref Range    Ventricular Rate 78 BPM    Atrial Rate 78 BPM    QRS Duration 128 ms    QT Interval 430 ms    QTC Calculation(Bazett) 490 ms    R Axis 133 degrees    T Axis -18 degrees    QRS Count 13 beats    Q Onset 215 ms    T Offset 430 ms    QTC Fredericia 469 ms   Lactate   Result Value Ref Range    Lactate 2.0 0.4 - 2.0 mmol/L   Troponin I, High Sensitivity   Result Value Ref Range    Troponin I, High Sensitivity 234 (HH) 0 - 53 ng/L   Blood Gas Venous   Result Value Ref Range    POCT pH, Venous 7.45 (H) 7.33 - 7.43 pH    POCT pCO2, Venous 37 (L) 41 - 51 mm Hg    POCT pO2, Venous 45 35 - 45 mm Hg    POCT SO2, Venous 76 (H) 45 - 75 %    POCT Oxy Hemoglobin, Venous 74.2 45.0 - 75.0 %    POCT Base Excess, Venous 1.8 -2.0 - 3.0 mmol/L    POCT HCO3 Calculated, Venous 25.7 22.0 - 26.0 mmol/L    Patient Temperature 37.0 degrees Celsius    FiO2 21 %   Urine electrolytes    Result Value Ref Range    Sodium, Urine Random 18 mmol/L    Sodium/Creatinine Ratio 23 Not established. mmol/g Creat    Potassium, Urine Random 38 mmol/L    Potassium/Creatinine Ratio 48 Not established mmol/g Creat    Chloride, Urine Random <15 mmol/L    Chloride/Creatinine Ratio      Creatinine, Urine Random 79.0 20.0 - 370.0 mg/dL   Blood Gas Arterial Full Panel   Result Value Ref Range    POCT pH, Arterial 7.52 (H) 7.38 - 7.42 pH    POCT pCO2, Arterial 28 (L) 38 - 42 mm Hg    POCT pO2, Arterial 77 (L) 85 - 95 mm Hg    POCT SO2, Arterial 98 94 - 100 %    POCT Oxy Hemoglobin, Arterial 94.9 94.0 - 98.0 %    POCT Hematocrit Calculated, Arterial 31.0 (L) 41.0 - 52.0 %    POCT Sodium, Arterial 129 (L) 136 - 145 mmol/L    POCT Potassium, Arterial 3.8 3.5 - 5.3 mmol/L    POCT Chloride, Arterial 101 98 - 107 mmol/L    POCT Ionized Calcium, Arterial 0.98 (L) 1.10 - 1.33 mmol/L    POCT Glucose, Arterial 122 (H) 74 - 99 mg/dL    POCT Lactate, Arterial 1.0 0.4 - 2.0 mmol/L    POCT Base Excess, Arterial 0.7 -2.0 - 3.0 mmol/L    POCT HCO3 Calculated, Arterial 22.9 22.0 - 26.0 mmol/L    POCT Hemoglobin, Arterial 10.2 (L) 13.5 - 17.5 g/dL    POCT Anion Gap, Arterial 9 (L) 10 - 25 mmo/L    Patient Temperature 37.0 degrees Celsius    FiO2 21 %   CBC and Auto Differential   Result Value Ref Range    WBC 19.9 (H) 4.4 - 11.3 x10*3/uL    nRBC 0.0 0.0 - 0.0 /100 WBCs    RBC 3.48 (L) 4.50 - 5.90 x10*6/uL    Hemoglobin 10.0 (L) 13.5 - 17.5 g/dL    Hematocrit 28.8 (L) 41.0 - 52.0 %    MCV 83 80 - 100 fL    MCH 28.7 26.0 - 34.0 pg    MCHC 34.7 32.0 - 36.0 g/dL    RDW 15.0 (H) 11.5 - 14.5 %    Platelets 227 150 - 450 x10*3/uL    Neutrophils % 89.1 40.0 - 80.0 %    Immature Granulocytes %, Automated 0.7 0.0 - 0.9 %    Lymphocytes % 3.9 13.0 - 44.0 %    Monocytes % 5.8 2.0 - 10.0 %    Eosinophils % 0.2 0.0 - 6.0 %    Basophils % 0.3 0.0 - 2.0 %    Neutrophils Absolute 17.72 (H) 1.60 - 5.50 x10*3/uL    Immature Granulocytes Absolute,  Automated 0.14 0.00 - 0.50 x10*3/uL    Lymphocytes Absolute 0.77 (L) 0.80 - 3.00 x10*3/uL    Monocytes Absolute 1.15 (H) 0.05 - 0.80 x10*3/uL    Eosinophils Absolute 0.04 0.00 - 0.40 x10*3/uL    Basophils Absolute 0.05 0.00 - 0.10 x10*3/uL   Magnesium   Result Value Ref Range    Magnesium 1.97 1.60 - 2.40 mg/dL   Renal Function Panel   Result Value Ref Range    Glucose 97 74 - 99 mg/dL    Sodium 134 (L) 136 - 145 mmol/L    Potassium 3.6 3.5 - 5.3 mmol/L    Chloride 98 98 - 107 mmol/L    Bicarbonate 25 21 - 32 mmol/L    Anion Gap 15 10 - 20 mmol/L    Urea Nitrogen 52 (H) 6 - 23 mg/dL    Creatinine 2.45 (H) 0.50 - 1.30 mg/dL    eGFR 26 (L) >60 mL/min/1.73m*2    Calcium 7.7 (L) 8.6 - 10.6 mg/dL    Phosphorus 2.1 (L) 2.5 - 4.9 mg/dL    Albumin 2.9 (L) 3.4 - 5.0 g/dL        Electrocardiogram, 12-lead PRN ACS symptoms    Result Date: 3/20/2024  Atrial fibrillation Right bundle branch block Septal infarct (cited on or before 22-DEC-2016) Abnormal ECG When compared with ECG of 19-MAR-2024 14:07, Right bundle branch block has replaced Nonspecific intraventricular block    XR chest 1 view    Result Date: 3/20/2024  Interpreted By:  Nhung Cruz and Afshari Mirak Sohrab STUDY: XR CHEST 1 VIEW;  3/20/2024 3:59 am   INDICATION: Signs/Symptoms:CVC placement rij.   COMPARISON: X-ray 03/19/2020   ACCESSION NUMBER(S): [id number]   ORDERING CLINICIAN: CHANA SAMUEL   FINDINGS: AP radiograph of the chest was provided.   Right IJ approach central venous catheter tip projects over right atrium.   CARDIOMEDIASTINAL SILHOUETTE: Cardiomediastinal silhouette is stable in size and configuration, enlarged.   LUNGS: Again noted are prominent perihilar interstitial markings. There are hazy opacities in bilateral lung bases with obscuration of the costophrenic angles.   ABDOMEN: No remarkable upper abdominal findings.   BONES: No acute osseous changes.       1. Cardiomegaly with pulmonary edema. 2. Bilateral pleural effusions with  adjacent atelectasis/infiltrate.   I personally reviewed the images/study and I agree with the findings as stated by resident physician Dr. Blaine Bowser . This study was interpreted at [facility], [place], [place].   MACRO: None   Signed by: Nhung Cruz 3/20/2024 8:32 AM Dictation workstation:   [id number]    ECG 12 lead    Result Date: 3/19/2024  Atrial fibrillation Right axis deviation Nonspecific intraventricular block Cannot rule out Anteroseptal infarct (cited on or before 22-DEC-2016) Abnormal ECG When compared with ECG of 22-DEC-2016 10:53, Significant changes have occurred See ED provider note for full interpretation and clinical correlation Confirmed by Kelley Santos ([id number]) on 3/19/2024 10:53:37 PM    Transthoracic Echo (TTE) Complete    Result Date: 3/19/2024   [facility], [address] [place] [address]                Tel [phone] and Fax [phone] TRANSTHORACIC ECHOCARDIOGRAM REPORT  Patient Name:      MAGNUS Alegre Physician:    [id number] Melissa Vilallba MD Study Date:        3/19/2024            Ordering Provider:    [id number] JOSH PACK MRN/PID:           [id number]             Fellow: Accession#:        [id number]         Nurse: Date of Birth/Age: 1945 / 78 years Sonographer:          Abilio Marshall                                                               [facility] Gender:            M                    Additional Staff:     [id number] Josh Pack MD Height:            160.02 cm            Admit Date:           3/19/2024 Weight:            65.77 kg             Admission Status:     Inpatient - STAT BSA / BMI:         1.69 m2 / 25.69      Encounter#:           [id number]                    kg/m2                                         Department Location:  [facility] Blood Pressure: 89 /67  mmHg Study Type:    TRANSTHORACIC ECHO (TTE) COMPLETE Diagnosis/ICD: Chronic diastolic (congestive) heart failure (CHF)-I50.32 Indication:    amyloidosis, shock CPT Code:      Echo Complete w Full Doppler-10135; Myocardial Strain                Imaging-[id number] Patient History: Pertinent History: Slow afib, parkinsons, CKD III, s/p left lower lobe                    lobectomy, amyloidosis. Study Detail: The following Echo studies were performed: 2D, M-Mode, Doppler,               color flow and Strain. Technically challenging study due to               patient lying in supine position. Agitated saline used as a               contrast agent for intraseptal flow evaluation.  PHYSICIAN INTERPRETATION: Left Ventricle: The left ventricular systolic function is low normal, with an estimated ejection fraction of 50-55%. The patient is in atrial fibrillation which may influence the estimate of left ventricular function and transvalvular flows. There are no regional wall motion abnormalities. The left ventricular cavity size is normal. There is severe concentric left ventricular hypertrophy. Left Ventricular Global Longitudinal Strain - 12.9 %. Spectral Doppler shows an abnormal pattern of left ventricular diastolic filling. There are elevated left atrial and left ventricular end diastolic pressures. Apical sparing in the apex is noted with impairment of GLS consistent with Amyloid. Left Atrium: The left atrium is severely dilated. A bubble study using agitated saline was performed. Bubble study is positive. A large PFO (> 20 bubbles) was demonstrated. There is evidence of an atrial septal aneurysm. Right Ventricle: The right ventricle is mildly enlarged. There is mildly increased right ventriclar wall thickness. There is mildly reduced right ventricular systolic function. Right Atrium: The right atrium is moderately dilated. Aortic Valve: The aortic valve appears structurally normal. There is mild aortic valve cusp  calcification. There is mild aortic valve regurgitation. The peak instantaneous gradient of the aortic valve is 25.2 mmHg. The mean gradient of the aortic valve is 13.0 mmHg. PHT is < 400 ms because of elevated LV filling pressures. Mitral Valve: The mitral valve is normal in structure. There is mild mitral valve regurgitation which is anteriorly directed. Tricuspid Valve: The tricuspid valve is structurally normal. There is moderate tricuspid regurgitation. The Doppler estimated RVSP is moderately elevated at 57.2 mmHg. Pulmonic Valve: The pulmonic valve is structurally normal. There is trace pulmonic valve regurgitation. Pericardium: There is a small pericardial effusion. Pericardial effusion is localized around RA and LV lateral wall. Aorta: The aortic root is normal. Systemic Veins: The inferior vena cava appears dilated. There is less than 50% IVC collapse with inspiration. In comparison to the previous echocardiogram(s): Compared with study from 12/22/2016, there is progression of LVH and RVH. Now it is more consistent with cardiac amyloid.  CONCLUSIONS:  1. Left ventricular systolic function is low normal with a 50-55% estimated ejection fraction.  2. Apical sparing in the apex is noted with impairment of GLS consistent with Amyloid.  3. Spectral Doppler shows an abnormal pattern of left ventricular diastolic filling.  4. There are elevated left atrial and left ventricular end diastolic pressures.  5. There is severe concentric left ventricular hypertrophy.  6. There is mildly reduced right ventricular systolic function.  7. The left atrium is severely dilated.  8. The right atrium is moderately dilated.  9. Pericardial effusion is localized around RA and LV lateral wall. 10. Moderate tricuspid regurgitation visualized. 11. Moderately elevated right ventricular systolic pressure. 12. Mild aortic valve regurgitation. 13. A bubble study using agitated saline was performed. Bubble study is positive. A large PFO  (> 20 bubbles) was demonstrated. 14. Atrial septal aneurysm present. 15. The patient is in atrial fibrillation which may influence the estimate of left ventricular function and transvalvular flows. 16. Compared with study from 12/22/2016, there is progression of LVH and RVH. Now it is more consistent with cardiac amyloid. QUANTITATIVE DATA SUMMARY: 2D MEASUREMENTS:                           Normal Ranges: Ao Root d:     3.30 cm    (2.0-3.7cm) LAs:           4.70 cm    (2.7-4.0cm) IVSd:          1.70 cm    (0.6-1.1cm) LVPWd:         1.70 cm    (0.6-1.1cm) LVIDd:         4.50 cm    (3.9-5.9cm) LVIDs:         3.60 cm LV Mass Index: 198.6 g/m2 LV % FS        20.0 % LA VOLUME:                               Normal Ranges: LA Vol A4C:        102.4 ml   (22+/-6mL/m2) LA Vol A2C:        70.2 ml LA Vol BP:         85.5 ml LA Vol Index A4C:  60.7ml/m2 LA Vol Index A2C:  41.6 ml/m2 LA Vol Index BP:   50.7 ml/m2 LA Area A4C:       28.5 cm2 LA Area A2C:       23.4 cm2 LA Major Axis A4C: 6.7 cm LA Major Axis A2C: 6.6 cm LA Volume Index:   50.6 ml/m2 AORTA MEASUREMENTS:                    Normal Ranges: Asc Ao, d: 3.80 cm (2.1-3.4cm) LV SYSTOLIC FUNCTION BY 2D PLANIMETRY (MOD):                                          Normal Ranges: EF-A4C View:                      35.9 % (>=55%) EF-A2C View:                      59.4 % EF-Biplane:                       48.0 % Global Longitudinal Strain (GLS): 12.9 % LV DIASTOLIC FUNCTION:                        Normal Ranges: MV Peak E:    1.24 m/s (0.7-1.2 m/s) MV e'         0.08 m/s (>8.0) MV lateral e' 0.11 m/s MV medial e'  0.06 m/s E/e' Ratio:   14.85    (<8.0) a'            0.04 m/s MV DT:        164 msec (150-240 msec) MITRAL VALVE:                 Normal Ranges: MV DT: 164 msec (150-240msec) MITRAL INSUFFICIENCY:                          Normal Ranges: PISA Radius:  0.5 cm MR Alias Mark: 38.5 cm/s MR Flow Rt:   60.48 ml/s AORTIC VALVE:                                    Normal Ranges: AoV  Vmax:                2.51 m/s  (<=1.7m/s) AoV Peak P.2 mmHg (<20mmHg) AoV Mean P.0 mmHg (1.7-11.5mmHg) LVOT Max Mark:            0.88 m/s  (<=1.1m/s) AoV VTI:                 39.30 cm  (18-25cm) LVOT VTI:                16.40 cm LVOT Diameter:           2.22 cm   (1.8-2.4cm) AoV Area, VTI:           1.62 cm2  (2.5-5.5cm2) AoV Area,Vmax:           1.35 cm2  (2.5-4.5cm2) AoV Dimensionless Index: 0.42  RIGHT VENTRICLE: RV Basal 4.00 cm RV Mid   2.30 cm RV Major 7.9 cm TAPSE:   14.6 mm RV s'    0.08 m/s TRICUSPID VALVE/RVSP:                             Normal Ranges: Peak TR Velocity: 3.25 m/s RV Syst Pressure: 57.2 mmHg (< 30mmHg) IVC Diam:         2.90 cm PULMONIC VALVE:                      Normal Ranges: PV Max Mark: 0.8 m/s  (0.6-0.9m/s) PV Max P.7 mmHg  [id number] Melissa Villalba MD Electronically signed on 3/19/2024 at 5:32:00 PM  ** Final **     CT chest abdomen pelvis wo IV contrast    Result Date: 3/19/2024  STUDY: CT Chest, Abdomen, and Pelvis without IV Contrast; 2024 at 4:29 PM INDICATION: Shock, unclear etiology. COMPARISON: XR chest of same date, 24. CT chest 24, 23, 22. ACCESSION NUMBER(S): [id number] ORDERING CLINICIAN: HOLLY RUEDA TECHNIQUE: CT of the chest, abdomen, and pelvis was performed.  Contiguous axial images were obtained at 3 mm slice thickness through the chest, abdomen, and pelvis.  Coronal and sagittal reconstructions at 3 mm slice thickness were performed.  No intravenous contrast was administered.  FINDINGS: Please note that the evaluation of vessels, lymph nodes and organs is limited without intravenous contrast. CHEST: MEDIASTINUM: The heart is enlarged and has a small pericardial effusion.  Coronary artery calcifications are identified.  LUNGS/PLEURA: There is small bilateral pleural effusions with associated atelectasis.  The airways are patent. Lungs are clear without consolidation, interstitial disease, or suspicious  nodules. LYMPH NODES: Thoracic lymph nodes are not enlarged. ABDOMEN:  LIVER: No hepatomegaly.  The liver has an irregular capsule.  Normal attenuation.  BILE DUCTS: No intrahepatic or extrahepatic biliary ductal dilatation.  GALLBLADDER: The gallbladder is unremarkable. STOMACH: No abnormalities identified.  PANCREAS: No masses or ductal dilatation.  SPLEEN: No splenomegaly or focal splenic lesion.  ADRENAL GLANDS: No thickening or nodules.  KIDNEYS AND URETERS: Kidneys are normal in size and location.  There is a nonobstructing 4 mm stone in the left kidney. There is a stable hyperdense cyst in the right kidney.  PELVIS:  BLADDER: No abnormalities identified.  REPRODUCTIVE ORGANS: No abnormalities identified.  BOWEL: No abnormalities identified. There is a large amount of stool in the colon.  VESSELS: No abnormalities identified.  Abdominal aorta is normal in caliber.  PERITONEUM/RETROPERITONEUM/LYMPH NODES: No free fluid.  No pneumoperitoneum. There is mild edema throughout the mesentery and subcutaneous fat. No lymphadenopathy.  ABDOMINAL WALL: No abnormalities identified. SOFT TISSUES: No abnormalities identified.  BONES: No acute fracture or aggressive osseous lesion.    CHEST CT: There is cardiomegaly with a small pericardial effusion. There are small bilateral pleural effusions with associated atelectasis. CT A/P: There is an irregular capsule in the liver which may represent cirrhosis. There is mild edema throughout the mesentery and subcutaneous fat. Large amount of stool in the colon. Stable hyperdense cyst in the right kidney. Nonobstructing 4 mm stone in the left kidney. Signed by Eulogio Beauchamp MD    XR chest 1 view    Result Date: 3/19/2024  STUDY: Chest Radiograph;  3/19/2024 at 2:31 PM. INDICATION: Sepsis workup. COMPARISON: CT chest 3/6/2024. ACCESSION NUMBER(S): [id number] ORDERING CLINICIAN: HOLLY RUEDA TECHNIQUE:  Frontal chest was obtained at 14:31 hours. FINDINGS: CARDIOMEDIASTINAL  SILHOUETTE: Cardiomediastinal silhouette is normal in size and configuration.  LUNGS: There is apparent residual left basilar pneumothorax which appears similar to prior study.  Lungs demonstrate chronic changes.  There is no acute airspace opacity.  ABDOMEN: No remarkable upper abdominal findings.  BONES: No acute osseous changes.    Left basilar pneumothorax appears unchanged compared with prior study.  Cardiomegaly appears stable.  Chronic changes.  No acute process. Signed by Jeison Hernandez DO    Point of Care Ultrasound    Result Date: 3/19/2024  Jb Oquendo MD     3/19/2024 11:50 PM Performed by: Jb Oquendo MD Authorized by: Jb Oquenod MD  Cardiac Indications: hypotension Procedure: Cardiac Ultrasound Findings:  Views: parasternal long, parasternal short, apical four and subxiphoid Effusion: The pericardial space was visualized and was positive for a PERICARDIAL EFFUSION. Activity: Ventricular contractions were visualized. LV: LV systolic function was DECREASED. RV: RV size was NORMAL. Impression: Cardiac: The focused cardiac ultrasound exam had ABNORMAL findings as specified. Comments: Plethoric, non-collapsing IVC       This patient currently has cardiac telemetry ordered; if you would like to modify or discontinue the telemetry order, click here to go to the orders activity to modify/discontinue the order.  This patient has a central line   Reason for the central line remaining today? Parenteral medication    This patient has a urinary catheter   Reason for the urinary catheter remaining today? critically ill patient who need accurate urinary output measurements          Assessment/Plan   Mr. Brady Ortega is an 78 year old male with PMHX of Parkinsonism, CKD stage 3 (baseline 1.3-1.6), Afib on Eliquis, HTN, CAD, Hx of lung Ca s/p LLL lobectomy, HFpEF (EF 50-55% March/2024) 2/2 suspected cardiac amyloidosis, and recent admission to [facility] 2/16-2/22 for Sepsis 2/2 cellulitis of RLL who  presented to the [facility] ED from home with hypotension and lethargy being treated for urosepsis shock w/ poor substrace     3/20 Updates:  - Stop vanc  - ID consult  - c/w to follow cultures     #Neuro  #Parkinsonism  - Will resume home carbidopa/levodopa 25/100 QID    #Acute Encephalopathy  - Likely in the setting of UTI  - Patient A&Ox4 on exam on arrival to CICU     #CV    #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   #HFpEF (EF 50-55% March/2024)  #HTN  #CAD  :: trop 237 and flat,   :: on home atorva, metolazone 2.5mg, torsemide 20mg alternating with 40mg  :: TTE 3/19: LVEF 50-55%; apical sparing of the apex, severe LVH; severe LA dilation; moderate RA dilation, moderate TR; + bubble study 2/2 PFO  :: SPEP, UPEP, and kappa jessica checked outpt and ruled out AL amyloid @ [facility]  :: ongoing discussions with wife and patient in regards to next steps, given low likelyhood of benefit in this patient  - will likely pursue PYP scan as outpatient or once patient improves  - if still on pressors tomorrow, will start midodrine (vasoplegia from parkinsons)  - once more stable, will start diuresis     #Afib  :: HR well controlled  - Continue Eliquis 2.5 mg BID     #Pulm  #Hx of Lung cancer s/p LLL lobectomy in 2018  :: Residual PTX seen on CT and CXR  - Stable and satting well on RA     #Renal  #BEKA on CKD stage 3  #BPH  :: Baseline Cr 1.3-1.6  :: Cr elevated at 2.34 on arrival   :: FeNa consistent with pre-renal, likely from poor renal perfusion from systemic venodilation  - will continue to monitor  - start home tamzulosin 0.4mg PO  - will start tadalafil in the next few days  - will re-address sherwood daily    #GI  #Chronic constipation   -  Will start Miralax scheduled     #ID  #Septic Shock 2/2 UTI  :: UA concerning for UTI + UTI symptoms + febrile + leukocytosis  :: prior bacteremia w/ Streptococcus dysgalactiae Oct/2023  :: no positive urine cultures from prior  :: urine culture: gram negative bacilli. Pending further  sensitivities  :: CT chest mostly un-revealing \  - stop vanc  - c/w pip/tazo until further speciation and resistance  - ID consult per wife request given multiple sepsis episodes  - will continue to follow cultures    #Endo  #Hypothyroidism  ::TSH 7.36, fT4 1.11 wnl  -Continue home levothyroxine 88mcg    #Hyperparathyroidism  - Was recently told to stop cincalcet per patients wife    F: PRN  E: PRN  N: Cardiac diet  A: R radial A-line; RIJ  DVT ppx: Home Eliquis  GI ppx: PPI  Abx: Pip/tazo  O2: RA  Gtts: Levo   Code Status: DNAR/DNI confirmed on arrival   NOK: Rachidemeka Jordan [phone]            Abilio Fonseca MD

## 2024-03-20 NOTE — PROGRESS NOTES
Occupational Therapy    Evaluation and Treatment    Patient Name: Brady Ortega  MRN: [id number]  Today's Date: 3/20/2024  Time Calculation  Start Time: 1151  Stop Time: 1223  Time Calculation (min): 32 min    Assessment  IP OT Assessment  OT Assessment: Pt needed extensive assist fro all functional task completion. Decreased strength, coordination, balance, strength, and endurance. Would benefit from skilled serives to maximize functional potential.  Prognosis: Good  Evaluation/Treatment Tolerance: Patient limited by fatigue  End of Session Communication: Bedside nurse  End of Session Patient Position: Bed, 3 rail up, Alarm off, not on at start of session  Plan:  Treatment Interventions: ADL retraining, Functional transfer training, UE strengthening/ROM, Endurance training, Neuromuscular reeducation, Fine motor coordination activities  OT Frequency: 3 times per week  OT Discharge Recommendations: Moderate intensity level of continued care  Equipment Recommended upon Discharge:  (TBD)  OT Recommended Transfer Status: Assist of 2  OT - OK to Discharge: Yes (Per OT POC)    Subjective   Current Problem:  1. Shock (CMS/HCC)  Point of Care Ultrasound    Point of Care Ultrasound    Insert arterial line    Arterial Line Insertion    CANCELED: Transthoracic Echo (TTE) Complete    CANCELED: Transthoracic Echo (TTE) Complete      2. Chronic heart failure with preserved ejection fraction (CMS/HCC)  Transthoracic Echo (TTE) Complete    Transthoracic Echo (TTE) Complete      3. Sepsis with acute organ dysfunction and septic shock, due to unspecified organism, unspecified organ dysfunction type (CMS/HCC)        4. 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 Needs extensive assist for all functional mobility tasks. He puts forth good effort to his tolerance. Observed B hand ulnar drift and IP triggering. Resting tremors. Reports chronic RLE drop-foot. Wife plans to bring in brace and shoe.   Precautions:  Medical Precautions: Cardiac precautions, Fall precautions  Vital Signs:  Heart Rate: 86 (99)  Resp: 18 (21)  SpO2: 97 % (97)  BP: (!) 115/47 (114/54)  MAP (mmHg): 67 (71)  BP Method: Arterial line  Pain:  Pain Assessment  Pain Assessment: 0-10  Pain Score: 0 - No pain  Lines/Tubes/Drains:  CVC 03/20/24 Triple lumen Right Internal jugular (Active)   Number of days: 0       Arterial Line 03/19/24 Left Radial (Active)   Number of days: 0       Urethral Catheter Coude 14 Fr. (Active)   Number of days: 0         Objective   Cognition:  Overall Cognitive Status: Within Functional Limits  Orientation Level: Oriented X4     Confusion Assessment Method (CAM)  Acute Onset and Fluctuating Course (1A): No  Chapa Agitation Sedation Scale  Chapa Agitation Sedation Scale (RASS): Alert and calm  Home Living:  Type of Home: House  Lives With: Spouse  Home Adaptive Equipment: Cane  Home Layout: Multi-level, 1/2 bath on main level, Bed/bath upstairs  Home Access: Stairs to enter with rails  Entrance Stairs-Number of Steps: 3  Bathroom Shower/Tub: Walk-in shower  Bathroom Equipment: Grab bars in shower, Shower chair with back   Prior Function:  Level of [place]: Independent with ADLs and functional transfers  Receives Help From: Family  ADL Assistance: Needs  assistance (Wife assists as needed for LB dressing and bathing)  Homemaking Assistance: Needs assistance (Wife completes. ALso, home health set to come and assist.)  Ambulatory Assistance: Independent  Vocational: Retired  Leisure: Enjoys photography. Was working out regularly prior to Oct 2023  Prior Function Comments: Just recently returned home from SNF. Has needed increased assist for all I/ADLs since Oct.  IADL History:     ADL:  Eating Assistance: Minimal  Eating Deficit: Setup, Supervision/safety, Increased time to complete, Adaptive utensils (Comment)  Grooming Assistance: Minimal  Bathing Assistance: Moderate (Anticipate)  UE Dressing Assistance: Minimal  LE Dressing Assistance: Maximal (Anticipate)  Toileting Assistance with Device: Maximal  Activity Tolerance:  Endurance: Decreased tolerance for upright activites  Early Mobility/Exercise Safety Screen: Proceed with mobilization - No exclusion criteria met  Balance:  Dynamic Sitting Balance  Dynamic Sitting-Comments:  (Fair. Retropulsive in sitting.)  Static Sitting Balance  Static Sitting-Level of Assistance: Moderate assistance  Static Standing Balance  Static Standing-Level of Assistance: Maximum assistance (x2)  Bed Mobility/Transfers: Bed Mobility/Transfers: Bed Mobility  Bed Mobility: Yes  Bed Mobility 1  Bed Mobility 1: Supine to sitting, Sitting to supine  Level of Assistance 1: Moderate assistance (x2)  Bed Mobility Comments 1: HOB raised and cues for hand placement and technique  Functional Mobility  Functional Mobility Performed: Yes  Functional Mobility 1  Device 1: No device  Assistance 1: Moderate assistance, Arm in arm assistance, Maximum assistance (x2)  Comments 1: 4 steps each forwrd/reverse and lateral. Retro insteanding and needed assist to correct.   and Transfers  Transfer: Yes  Transfer 1  Transfer From 1: Sit to  Transfer to 1: Stand  Technique 1: Sit to stand, Stand to sit  Trials/Comments 1: MOD A x2 progressing to MIN A x2 arm in  arm for 2 trials sit<>stand. Cues for posture and technique.  IADL's:      Vision:     and Vision - Complex Assessment  Ocular Range of Motion: Within Functional Limits  Sensation:  Sensation Comment: Reports some neuropathy B hands. Intact to light touch.  Strength:  Strength Comments: BUE strength overall 3/5  Perception:  Inattention/Neglect: Appears intact  Coordination:  Coordination Comment: Tremors observed   Hand Function:  Hand Function  Gross Grasp: Functional  Coordination: Impaired (Uses AE for feeding d/t tremors. Wife reports he has difficuilty holding cups.)      Outcome Measures: [facility] Daily Activity  Putting on and taking off regular lower body clothing: A lot  Bathing (including washing, rinsing, drying): A lot  Putting on and taking off regular upper body clothing: A little  Toileting, which includes using toilet, bedpan or urinal: A lot  Taking care of personal grooming such as brushing teeth: A little  Eating Meals: A little  Daily Activity - Total Score: 15    Confusion Assessment Method-ICU (CAM-ICU)  Feature 3: Altered Level of Consciousness: Negative   ICU Mobility Screen  Early Mobility/Exercise Safety Screen: Proceed with mobilization - No exclusion criteria met,   [place]apa Agitation Sedation Scale  [place] Agitation Sedation Scale (RASS): Alert and calm      Education Documentation  Body Mechanics, taught by Juana Dean OT at 3/20/2024  3:26 PM.  Learner: Patient  Readiness: Acceptance  Method: Explanation  Response: Verbalizes Understanding    ADL Training, taught by Juana Dean OT at 3/20/2024  3:26 PM.  Learner: Patient  Readiness: Acceptance  Method: Explanation  Response: Verbalizes Understanding    Education Comments  No comments found.        Goals:   Encounter Problems       Encounter Problems (Active)       ADLs       Patient will complete lower body dressing with MIN A for donning and doffing all LE clothes in order to increase Indep with task participation.         Start:  03/20/24    Expected End:  04/10/24            Patient will complete toileting, including clothing management and hygiene, with MIN A in order to maximize functional Indep with task completion.        Start:  03/20/24    Expected End:  04/10/24               BALANCE       Pt will increase static/dynamic sit/stand to Good to increase safety and indep with functional task completion.         Start:  03/20/24    Expected End:  04/10/24               EXERCISE/STRENGTHENING       Pt will increase overall BUE strength to 4/5 in order to increase functional task participation.        Start:  03/20/24    Expected End:  04/10/24            Pt will demo increased BUE fine motor/bimanual coordination in order to achieve MOD I with functional task completion.        Start:  03/20/24    Expected End:  04/10/24            Pt will increase endurance to tolerate 15-20min of activity with no more than 1 rest break in order to increase ability to engage in ADL completion.        Start:  03/20/24    Expected End:  04/10/24               TRANSFERS       Patient will complete functional transfers using least restrictive device with MIN A in order to maximize functional potential and increase safety.        Start:  03/20/24    Expected End:  04/10/24                   Treatment Completed on Evaluation      Therapy/Activity:     Therapeutic Activity  Therapeutic Activity Performed: Yes  Therapeutic Activity 1: Static EOB sit for approx 5min with MOD A progressing to CGA. Retropulsive and often needing cues and assist to correct.  Therapeutic Activity 2: Seated postural control tasks. Retro in sitting. Cues and assit to correct.  Therapeutic Activity 3: Static stand for approx 1min x2 with MAX A x2. Rretropulsive at times and needed verbal and tactile cues to correct.          03/20/24 at 3:27 PM   Juana Dean, OT   Rehab Office: [phone]

## 2024-03-21 LAB
ABO GROUP (TYPE) IN BLOOD: NORMAL
ALBUMIN SERPL BCP-MCNC: 2.6 G/DL (ref 3.4–5)
ALP SERPL-CCNC: 84 U/L (ref 33–136)
ALT SERPL W P-5'-P-CCNC: 5 U/L (ref 10–52)
ANION GAP SERPL CALC-SCNC: 13 MMOL/L (ref 10–20)
ANTIBODY SCREEN: NORMAL
APTT PPP: 37 SECONDS (ref 27–38)
AST SERPL W P-5'-P-CCNC: 24 U/L (ref 9–39)
BACTERIA UR CULT: ABNORMAL
BASOPHILS # BLD AUTO: 0.03 X10*3/UL (ref 0–0.1)
BASOPHILS NFR BLD AUTO: 0.3 %
BILIRUB DIRECT SERPL-MCNC: 0.3 MG/DL (ref 0–0.3)
BILIRUB SERPL-MCNC: 0.8 MG/DL (ref 0–1.2)
BUN SERPL-MCNC: 48 MG/DL (ref 6–23)
CALCIUM SERPL-MCNC: 7.4 MG/DL (ref 8.6–10.6)
CHLORIDE SERPL-SCNC: 99 MMOL/L (ref 98–107)
CO2 SERPL-SCNC: 24 MMOL/L (ref 21–32)
CREAT SERPL-MCNC: 2.27 MG/DL (ref 0.5–1.3)
EGFRCR SERPLBLD CKD-EPI 2021: 28 ML/MIN/1.73M*2
EOSINOPHIL # BLD AUTO: 0.51 X10*3/UL (ref 0–0.4)
EOSINOPHIL NFR BLD AUTO: 4.5 %
ERYTHROCYTE [DISTWIDTH] IN BLOOD BY AUTOMATED COUNT: 15.1 % (ref 11.5–14.5)
GLUCOSE SERPL-MCNC: 97 MG/DL (ref 74–99)
HCT VFR BLD AUTO: 27.5 % (ref 41–52)
HGB BLD-MCNC: 9.2 G/DL (ref 13.5–17.5)
IMM GRANULOCYTES # BLD AUTO: 0.06 X10*3/UL (ref 0–0.5)
IMM GRANULOCYTES NFR BLD AUTO: 0.5 % (ref 0–0.9)
INR PPP: 2.3 (ref 0.9–1.1)
LYMPHOCYTES # BLD AUTO: 0.85 X10*3/UL (ref 0.8–3)
LYMPHOCYTES NFR BLD AUTO: 7.5 %
MAGNESIUM SERPL-MCNC: 1.91 MG/DL (ref 1.6–2.4)
MCH RBC QN AUTO: 27.7 PG (ref 26–34)
MCHC RBC AUTO-ENTMCNC: 33.5 G/DL (ref 32–36)
MCV RBC AUTO: 83 FL (ref 80–100)
MONOCYTES # BLD AUTO: 1.07 X10*3/UL (ref 0.05–0.8)
MONOCYTES NFR BLD AUTO: 9.4 %
NEUTROPHILS # BLD AUTO: 8.81 X10*3/UL (ref 1.6–5.5)
NEUTROPHILS NFR BLD AUTO: 77.8 %
NRBC BLD-RTO: 0 /100 WBCS (ref 0–0)
PHOSPHATE SERPL-MCNC: 2.6 MG/DL (ref 2.5–4.9)
PLATELET # BLD AUTO: 182 X10*3/UL (ref 150–450)
POTASSIUM SERPL-SCNC: 3.5 MMOL/L (ref 3.5–5.3)
PROT SERPL-MCNC: 4.7 G/DL (ref 6.4–8.2)
PROTHROMBIN TIME: 26.5 SECONDS (ref 9.8–12.8)
RBC # BLD AUTO: 3.32 X10*6/UL (ref 4.5–5.9)
RH FACTOR (ANTIGEN D): NORMAL
SODIUM SERPL-SCNC: 132 MMOL/L (ref 136–145)
WBC # BLD AUTO: 11.3 X10*3/UL (ref 4.4–11.3)

## 2024-03-21 PROCEDURE — 83735 ASSAY OF MAGNESIUM: CPT

## 2024-03-21 PROCEDURE — 80053 COMPREHEN METABOLIC PANEL: CPT

## 2024-03-21 PROCEDURE — 97112 NEUROMUSCULAR REEDUCATION: CPT | Mod: GO

## 2024-03-21 PROCEDURE — 2500000004 HC RX 250 GENERAL PHARMACY W/ HCPCS (ALT 636 FOR OP/ED)

## 2024-03-21 PROCEDURE — 85610 PROTHROMBIN TIME: CPT

## 2024-03-21 PROCEDURE — 86901 BLOOD TYPING SEROLOGIC RH(D): CPT

## 2024-03-21 PROCEDURE — 99291 CRITICAL CARE FIRST HOUR: CPT | Performed by: INTERNAL MEDICINE

## 2024-03-21 PROCEDURE — 37799 UNLISTED PX VASCULAR SURGERY: CPT

## 2024-03-21 PROCEDURE — 85025 COMPLETE CBC W/AUTO DIFF WBC: CPT

## 2024-03-21 PROCEDURE — 97530 THERAPEUTIC ACTIVITIES: CPT | Mod: GO

## 2024-03-21 PROCEDURE — 2500000001 HC RX 250 WO HCPCS SELF ADMINISTERED DRUGS (ALT 637 FOR MEDICARE OP)

## 2024-03-21 PROCEDURE — 97110 THERAPEUTIC EXERCISES: CPT | Mod: GP

## 2024-03-21 PROCEDURE — 2500000002 HC RX 250 W HCPCS SELF ADMINISTERED DRUGS (ALT 637 FOR MEDICARE OP, ALT 636 FOR OP/ED): Performed by: STUDENT IN AN ORGANIZED HEALTH CARE EDUCATION/TRAINING PROGRAM

## 2024-03-21 PROCEDURE — 84100 ASSAY OF PHOSPHORUS: CPT

## 2024-03-21 PROCEDURE — 2500000004 HC RX 250 GENERAL PHARMACY W/ HCPCS (ALT 636 FOR OP/ED): Performed by: STUDENT IN AN ORGANIZED HEALTH CARE EDUCATION/TRAINING PROGRAM

## 2024-03-21 PROCEDURE — 2500000005 HC RX 250 GENERAL PHARMACY W/O HCPCS: Performed by: STUDENT IN AN ORGANIZED HEALTH CARE EDUCATION/TRAINING PROGRAM

## 2024-03-21 PROCEDURE — 2500000002 HC RX 250 W HCPCS SELF ADMINISTERED DRUGS (ALT 637 FOR MEDICARE OP, ALT 636 FOR OP/ED)

## 2024-03-21 PROCEDURE — 2500000005 HC RX 250 GENERAL PHARMACY W/O HCPCS

## 2024-03-21 PROCEDURE — 97530 THERAPEUTIC ACTIVITIES: CPT | Mod: GP

## 2024-03-21 PROCEDURE — 1200000002 HC GENERAL ROOM WITH TELEMETRY DAILY

## 2024-03-21 PROCEDURE — 82248 BILIRUBIN DIRECT: CPT

## 2024-03-21 RX ORDER — ACETAMINOPHEN 325 MG/1
975 TABLET ORAL ONCE
Status: COMPLETED | OUTPATIENT
Start: 2024-03-21 | End: 2024-03-21

## 2024-03-21 RX ADMIN — APIXABAN 2.5 MG: 2.5 TABLET, FILM COATED ORAL at 21:15

## 2024-03-21 RX ADMIN — CARBIDOPA AND LEVODOPA 2 TABLET: 25; 100 TABLET ORAL at 11:48

## 2024-03-21 RX ADMIN — IPRATROPIUM BROMIDE 2 SPRAY: 21 SPRAY, METERED NASAL at 22:13

## 2024-03-21 RX ADMIN — TAMSULOSIN HYDROCHLORIDE 0.4 MG: 0.4 CAPSULE ORAL at 08:07

## 2024-03-21 RX ADMIN — ACETAMINOPHEN 650 MG: 325 TABLET ORAL at 21:15

## 2024-03-21 RX ADMIN — LIDOCAINE 1 PATCH: 4 PATCH TOPICAL at 08:08

## 2024-03-21 RX ADMIN — NOREPINEPHRINE BITARTRATE 0.02 MCG/KG/MIN: 8 INJECTION, SOLUTION INTRAVENOUS at 01:45

## 2024-03-21 RX ADMIN — POLYETHYLENE GLYCOL 3350 17 G: 17 POWDER, FOR SOLUTION ORAL at 21:15

## 2024-03-21 RX ADMIN — PANTOPRAZOLE SODIUM 40 MG: 40 TABLET, DELAYED RELEASE ORAL at 08:12

## 2024-03-21 RX ADMIN — PIPERACILLIN SODIUM AND TAZOBACTAM SODIUM 2.25 G: 2; .25 INJECTION, SOLUTION INTRAVENOUS at 06:32

## 2024-03-21 RX ADMIN — AZELASTINE HYDROCHLORIDE 2 SPRAY: 137 SPRAY, METERED NASAL at 08:09

## 2024-03-21 RX ADMIN — PIPERACILLIN SODIUM AND TAZOBACTAM SODIUM 2.25 G: 2; .25 INJECTION, SOLUTION INTRAVENOUS at 14:18

## 2024-03-21 RX ADMIN — LEVOTHYROXINE SODIUM 88 MCG: 0.09 TABLET ORAL at 08:06

## 2024-03-21 RX ADMIN — CARBIDOPA AND LEVODOPA 2 TABLET: 25; 100 TABLET ORAL at 15:21

## 2024-03-21 RX ADMIN — ACETAMINOPHEN 975 MG: 325 TABLET ORAL at 02:53

## 2024-03-21 RX ADMIN — IPRATROPIUM BROMIDE 2 SPRAY: 21 SPRAY, METERED NASAL at 08:09

## 2024-03-21 RX ADMIN — CARBIDOPA AND LEVODOPA 2 TABLET: 25; 100 TABLET ORAL at 18:21

## 2024-03-21 RX ADMIN — CARBIDOPA AND LEVODOPA 2 TABLET: 25; 100 TABLET ORAL at 08:07

## 2024-03-21 RX ADMIN — CARBIDOPA AND LEVODOPA 0.5 TABLET: 25; 100 TABLET ORAL at 18:22

## 2024-03-21 RX ADMIN — PIPERACILLIN SODIUM AND TAZOBACTAM SODIUM 2.25 G: 2; .25 INJECTION, SOLUTION INTRAVENOUS at 18:22

## 2024-03-21 RX ADMIN — POLYETHYLENE GLYCOL 3350 17 G: 17 POWDER, FOR SOLUTION ORAL at 08:09

## 2024-03-21 RX ADMIN — AZELASTINE HYDROCHLORIDE 2 SPRAY: 137 SPRAY, METERED NASAL at 21:15

## 2024-03-21 RX ADMIN — APIXABAN 2.5 MG: 2.5 TABLET, FILM COATED ORAL at 08:07

## 2024-03-21 RX ADMIN — PIPERACILLIN SODIUM AND TAZOBACTAM SODIUM 2.25 G: 2; .25 INJECTION, SOLUTION INTRAVENOUS at 00:31

## 2024-03-21 ASSESSMENT — COGNITIVE AND FUNCTIONAL STATUS - GENERAL
TURNING FROM BACK TO SIDE WHILE IN FLAT BAD: A LITTLE
MOVING TO AND FROM BED TO CHAIR: A LOT
STANDING UP FROM CHAIR USING ARMS: A LOT
CLIMB 3 TO 5 STEPS WITH RAILING: TOTAL
WALKING IN HOSPITAL ROOM: TOTAL
DRESSING REGULAR LOWER BODY CLOTHING: A LOT
HELP NEEDED FOR BATHING: A LOT
PERSONAL GROOMING: A LITTLE
DAILY ACTIVITIY SCORE: 15
MOVING FROM LYING ON BACK TO SITTING ON SIDE OF FLAT BED WITH BEDRAILS: A LITTLE
TOILETING: A LOT
MOBILITY SCORE: 12
DRESSING REGULAR UPPER BODY CLOTHING: A LITTLE
EATING MEALS: A LITTLE

## 2024-03-21 ASSESSMENT — PAIN - FUNCTIONAL ASSESSMENT
PAIN_FUNCTIONAL_ASSESSMENT: 0-10

## 2024-03-21 ASSESSMENT — PAIN SCALES - GENERAL
PAINLEVEL_OUTOF10: 0 - NO PAIN
PAINLEVEL_OUTOF10: 3
PAINLEVEL_OUTOF10: 0 - NO PAIN
PAINLEVEL_OUTOF10: 1
PAINLEVEL_OUTOF10: 0 - NO PAIN

## 2024-03-21 NOTE — CONSULTS
Reason For Consult  Urinary retention iso urosepsis    History Of Present Illness  Brady Ortega is a 78 y.o. male past medical history of HFpEF, HTN, HL, CKD3 with baseline cr 1.3-1.6, paroxysmal afib with slow VR, suspected cardiac amyloidosis, BPH osteoarthritis, hypothyroidism, hyperparathyroidism, gout, parkinson's disease, history of lung CA s/p left lower lobe resection, chronic thrombocytopenia, low voltage recent EKG who is admitted for urosepsis. Urology consulted for urinary retention.    Patient examined with wife at bedside. They states that prior to his recent hospitalization in feb he was voiding well. Endorses no nocturia, with no hesitancy or weak stream. However following the illness he required a sherwood catheter at [place] and was seen by Dr. Hankins in early march. He failed at that time but the plan was to go home with [facility] and follow up with Dr. Berry for further intervention consideration. The patient then re-presented and found to have urosepsis. The wife endorses struggling with [facility] consistently.      Past Medical History  He has a past medical history of Dry eye syndrome of left lacrimal gland (10/30/2015), Dry eye syndrome of left lacrimal gland (10/30/2015), Hordeolum internum left lower eyelid (12/04/2015), Noninfective gastroenteritis and colitis, unspecified, Other bursitis of knee, unspecified knee, Personal history of other diseases of the respiratory system, and Personal history of other endocrine, nutritional and metabolic disease.    Surgical History  He has a past surgical history that includes Back surgery (11/15/2013); Hernia repair (11/15/2013); Vasectomy (11/15/2013); Total hip arthroplasty (11/15/2013); Lung lobectomy (01/13/2017); and Other surgical history (01/13/2017).     Social History  He reports that he quit smoking about 59 years ago. His smoking use included cigarettes. He has been exposed to tobacco smoke. He has never used smokeless tobacco. He reports that he does not  "drink alcohol and does not use drugs.    Family History  No family history on file.     Allergies  Pollen extracts, Bactrim [sulfamethoxazole-trimethoprim], Risedronate, and Sulfa (sulfonamide antibiotics)    Review of Systems    All other ROS negative expect those mentioned in HPI.      Physical Exam  General: Laying in bed. NAD.   Eyes: EOMI  ENMT: no apparent injury, no lesions seen, MMM  Head/neck: NCAT  Cardiac: regular rate in chart  Pulm: normal respiratory effort  GI: soft, NT/ND, no masses palpated  : sherwood in place, draining CYU  Skin: warm, dry, no lesions noted  Neuro: AOx3  Psych: appropriate mood and behavior      Last Recorded Vitals  Blood pressure (!) 93/43, pulse 72, temperature 36.7 °C (98.1 °F), temperature source Temporal, resp. rate 15, height 1.6 m (5' 3\"), weight 65.7 kg (144 lb 13.5 oz), SpO2 98 %.      Assessment/Plan     Brady Ortega is a 78 y.o. male past medical history of HFpEF, HTN, HL, CKD3 with baseline cr 1.3-1.6, paroxysmal afib with slow VR, suspected cardiac amyloidosis, BPH osteoarthritis, hypothyroidism, hyperparathyroidism, gout, parkinson's disease, history of lung CA s/p left lower lobe resection, chronic thrombocytopenia, low voltage recent EKG who is admitted for urosepsis. Urology consulted for urinary retention.    Sherwood has been in place since admission for urinary decompression and accurate I/O's. Had a lengthy discussion with the patient and his wife and it was determined that being discharged with a sherwood in place would be appropriate given his recent worsening in urinary retention, difficulties with [facility], and recent urosepsis.     Recommendations:  - Please maintain a sherwood catheter at discharge for urinary retention  - Patient to follow up with Dr. Berry regarding his urinary retention    Discussed with chief resident, Dr. Vick Gaona MD  Urology - PGY1  Pager [phone]   "

## 2024-03-21 NOTE — PROGRESS NOTES
Physical Therapy    Physical Therapy Treatment    Patient Name: Brady Ortega  MRN: [id number]  Today's Date: 3/21/2024  Time Calculation  Start Time: 0912  Stop Time: 0941  Time Calculation (min): 29 min       Assessment/Plan   PT Assessment  End of Session Communication: Bedside nurse  End of Session Patient Position: Up in chair, Alarm off, not on at start of session, Alarm off, caregiver present (All lines intact)     PT Plan  Treatment/Interventions: Bed mobility, Transfer training, Gait training, Stair training, Balance training, Neuromuscular re-education, Strengthening, Endurance training, Therapeutic exercise, Therapeutic activity  PT Plan: Skilled PT  PT Frequency: 4 times per week  PT Discharge Recommendations: Moderate intensity level of continued care  PT Recommended Transfer Status: Assist x2  PT - OK to Discharge: Yes      General Visit Information:   PT  Visit  PT Received On: 03/21/24  Co-Treatment: OT  Co-Treatment Reason: To maximize pt safety and mobility  Prior to Session Communication: Bedside nurse  Patient Position Received: Bed, 3 rail up, Alarm off, not on at start of session  Family/Caregiver Present: Yes  Caregiver Feedback: wife and daughter present and supportive  General Comment: Patient supine in bed upon arrival. Agreeable to session.     Subjective   Precautions:  Precautions  Medical Precautions: Cardiac precautions, Fall precautions  Vital Signs:  Vital Signs  Heart Rate:  (pre 63 post 78)  Heart Rate Source: Monitor  Resp:  (pre 17 post 16)  SpO2:  (pre 98 post 100)  BP:  (pre 94/42 (60) post 114.58 (78))  BP Method: Arterial line    Objective   Pain:  Pain Assessment  Pain Assessment: 0-10  Pain Score: 0 - No pain  Cognition:  Cognition  Overall Cognitive Status: Within Functional Limits  Orientation Level: Oriented X4  Lines/Tubes/Drains:  CVC 03/20/24 Triple lumen Right Internal jugular (Active)   Number of days: 1       Arterial Line 03/19/24 Left Radial (Active)   Number  of days: 1       Urethral Catheter Coude 14 Fr. (Active)   Number of days: 1       PT Treatments:  Therapeutic Exercise  Therapeutic Exercise Performed: Yes  Therapeutic Exercise Activity 1: Sitting EOB: BLEs LAQ, HF, ankle pumps x15  Therapeutic Exercise Activity 2: Standing at FWW min-mod assist reaching to various targets outside of WILDER with alternating UEs  Therapeutic Exercise Activity 3: x5 sit to/from stand for strenghtening/endurance and to work on forward lean d/t retropulsion. mod assist x1  Therapeutic Activity  Therapeutic Activity Performed: Yes  Therapeutic Activity 1: supine > sit: CGA  Therapeutic Activity 2: sit to/from stand: mod assist x1; Pt retropulsive with cues to correct  Therapeutic Activity 3: stand step transfer bed > chair: mod assist x2, cues for intitaition and to correct retropulsion       Outcome Measures:  [facility] Basic Mobility  Turning from your back to your side while in a flat bed without using bedrails: A little  Moving from lying on your back to sitting on the side of a flat bed without using bedrails: A little  Moving to and from bed to chair (including a wheelchair): A lot  Standing up from a chair using your arms (e.g. wheelchair or bedside chair): A lot  To walk in hospital room: Total  Climbing 3-5 steps with railing: Total  Basic Mobility - Total Score: 12           FSS-ICU  Ambulation: Walks <50 feet with any assistance x1 or walks any distance with assistance x2 people  Rolling: Moderate assistance (performs 50 - 74% of task)  Sitting: Moderate assistance (performs 50 - 74% of task)  Transfer Sit-to-Stand: Moderate assistance (performs 50 - 74% of task)  Transfer Supine-to-Sit: Supervision or set-up only  Total Score: 15                Education Documentation  Mobility Training, taught by Magdalena Garcia, PT at 3/21/2024 12:15 PM.  Learner: Patient  Readiness: Acceptance  Method: Explanation  Response: Verbalizes Understanding    Education Comments  No comments  found.          OP EDUCATION:       Encounter Problems       Encounter Problems (Active)       Balance       Pt will be able to score >24 on Tinetti for low risk of falls (Progressing)       Start:  03/20/24    Expected End:  04/03/24               Mobility       Patient will ambulate >100 ft with FWW and CGA  (Progressing)       Start:  03/20/24    Expected End:  04/03/24            Patient will ascend and descend 12 stairs with handrail and cane with CGA (Progressing)       Start:  03/20/24    Expected End:  04/03/24               PT Transfers       Patient will perform bed mobility with supervision (Progressing)       Start:  03/20/24    Expected End:  04/03/24            Patient will transfer sit to and from stand with LRAD and CGA (Progressing)       Start:  03/20/24    Expected End:  04/03/24 03/21/24 at 12:16 PM   CASSIUS LITTLEJOHN, PT   Rehab Office: [phone]

## 2024-03-21 NOTE — CARE PLAN
Palliative Medicine consulted today for symptom management and for patient and family support. [facility]. Pt requested to have wife present for conversation and agreeable to wait til morning for visit when wife (Elsa Ortega [phone]) present for visit. Pt denies any uncontrolled pain or symptoms at this time. Pt's nurse and primary team updated. Pt has transfer orders in place. Called pt's wife and updated. Mrs. Ortega will call when she arrives tomorrow morning and Palliative Medicine team will meet with patient and his wife for initial consult visit. She reports she usually arrives around 9am. Thank you for consult.

## 2024-03-21 NOTE — PROGRESS NOTES
"Brady Ortega is a 78 y.o. male on day 2 of admission presenting with Shock (CMS/HCC).    Subjective   - No significant overnight events  - Norepi weaned off by 2:00AM  - Patient more awake in the AM sitting on the chair and interactive  - No complains in the AM    Objective     Physical Exam  Constitutional:       Appearance: He is ill-appearing (chronically).   HENT:      Head: Normocephalic and atraumatic.   Eyes:      Extraocular Movements: Extraocular movements intact.      Pupils: Pupils are equal, round, and reactive to light.   Cardiovascular:      Rate and Rhythm: Normal rate. Rhythm irregular.      Heart sounds: Murmur heard.      No friction rub. No gallop.   Pulmonary:      Effort: Pulmonary effort is normal. No respiratory distress.      Breath sounds: No stridor. No wheezing or rales.   Abdominal:      General: Abdomen is flat. Bowel sounds are normal.      Palpations: Abdomen is soft.   Musculoskeletal:      Comments: Mild tremors noted. Rigidity on movement   Skin:     General: Skin is warm.      Capillary Refill: Capillary refill takes less than 2 seconds.   Neurological:      General: No focal deficit present.      Mental Status: He is alert.      Comments: Oriented x1   - No significant overnight events  - Patient weaned off norepi around 2:00am  - Seen in the AM without complains    Last Recorded Vitals  Blood pressure (!) 115/47, pulse 61, temperature 36.6 °C (97.9 °F), resp. rate 13, height 1.6 m (5' 3\"), weight 65.7 kg (144 lb 13.5 oz), SpO2 98 %.  Intake/Output last 3 Shifts:  I/O last 3 completed shifts:  In: 2766.7 (42.1 mL/kg) [P.O.:1060; I.V.:256.7 (3.9 mL/kg); IV Piggyback:1450]  Out: 1900 (28.9 mL/kg) [Urine:1900 (0.8 mL/kg/hr)]  Weight: 65.7 kg     Relevant Results    Scheduled medications  apixaban, 2.5 mg, oral, BID  atorvastatin, 20 mg, oral, Daily  azelastine, 2 spray, Each Nostril, BID  carbidopa-levodopa, 0.5 tablet, oral, Daily  carbidopa-levodopa, 2 tablet, oral, 4 times " per day  ipratropium, 2 spray, Each Nostril, BID  levothyroxine, 88 mcg, oral, Daily before breakfast  lidocaine, 1 patch, transdermal, Daily  pantoprazole, 40 mg, oral, Daily before breakfast  perflutren lipid microspheres, 0.5-10 mL of dilution, intravenous, Once in imaging  piperacillin-tazobactam, 2.25 g, intravenous, q6h  polyethylene glycol, 17 g, oral, BID  tamsulosin, 0.4 mg, oral, Daily      Continuous medications  norepinephrine, 0-3.3 mcg/kg/min, Last Rate: Stopped (03/21/24 0200)      PRN medications  PRN medications: acetaminophen     Results for orders placed or performed during the hospital encounter of 03/19/24 (from the past 24 hour(s))   Coagulation Screen   Result Value Ref Range    Protime 26.5 (H) 9.8 - 12.8 seconds    INR 2.3 (H) 0.9 - 1.1    aPTT 37 27 - 38 seconds        Electrocardiogram, 12-lead PRN ACS symptoms    Result Date: 3/20/2024  Atrial fibrillation Right bundle branch block Septal infarct (cited on or before 22-DEC-2016) Abnormal ECG When compared with ECG of 19-MAR-2024 14:07, Right bundle branch block has replaced Nonspecific intraventricular block    XR chest 1 view    Result Date: 3/20/2024  Interpreted By:  Nhung Cruz and Afshari Mirak Sohrab STUDY: XR CHEST 1 VIEW;  3/20/2024 3:59 am   INDICATION: Signs/Symptoms:CVC placement rij.   COMPARISON: X-ray 03/19/2020   ACCESSION NUMBER(S): [id number]   ORDERING CLINICIAN: CHANA SAMUEL   FINDINGS: AP radiograph of the chest was provided.   Right IJ approach central venous catheter tip projects over right atrium.   CARDIOMEDIASTINAL SILHOUETTE: Cardiomediastinal silhouette is stable in size and configuration, enlarged.   LUNGS: Again noted are prominent perihilar interstitial markings. There are hazy opacities in bilateral lung bases with obscuration of the costophrenic angles.   ABDOMEN: No remarkable upper abdominal findings.   BONES: No acute osseous changes.       1. Cardiomegaly with pulmonary edema. 2. Bilateral  pleural effusions with adjacent atelectasis/infiltrate.   I personally reviewed the images/study and I agree with the findings as stated by resident physician Dr. Blaine Bowser . This study was interpreted at [facility], [place], [place].   MACRO: None   Signed by: Nhung Cruz 3/20/2024 8:32 AM Dictation workstation:   [id number]         This patient currently has cardiac telemetry ordered; if you would like to modify or discontinue the telemetry order, click here to go to the orders activity to modify/discontinue the order.  This patient has a central line   Reason for the central line remaining today? Parenteral medication    This patient has a urinary catheter   Reason for the urinary catheter remaining today? critically ill patient who need accurate urinary output measurements          Assessment/Plan   Mr. Brady Ortega is an 78 year old male with PMHX of Parkinsonism, CKD stage 3 (baseline 1.3-1.6), Afib on Eliquis, HTN, CAD, Hx of lung Ca s/p LLL lobectomy, HFpEF (EF 50-55% March/2024) 2/2 suspected cardiac amyloidosis, and recent admission to [facility] 2/16-2/22 for Sepsis 2/2 cellulitis of RLL who presented to the [facility] ED from home with hypotension and lethargy being treated for urosepsis shock w/ poor substrace     3/21 Updates:  - c/w pip/tazo for now  - will plan for line removal with > 12 hrs of stability  - transfer to the floor  - pal care consult   - urology consult for urinary retention for sherwood plan on discharge     #Neuro  #Parkinsonism  - c/w home carbidopa/levodopa 25/100 QID    #Acute Encephalopathy - Resolved  - Likely in the setting of UTI  - Patient A&Ox4 on exam on arrival to CICU     #CV    #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   #HFpEF (EF 50-55% March/2024)  #HTN  #CAD  :: trop 237 and flat,   :: on home atorva, metolazone 2.5mg, torsemide 20mg alternating with 40mg  :: TTE 3/19: LVEF 50-55%; apical sparing of the apex, severe LVH;  severe LA dilation; moderate RA dilation, moderate TR; + bubble study 2/2 PFO  :: SPEP, UPEP, and kappa jessica checked outpt and ruled out AL amyloid @ [facility]  :: ongoing discussions with wife and patient in regards to next steps, given low likelyhood of benefit in this patient  - will likely pursue PYP scan as outpatient or once patient improves  - stop atorva  - will consider diuresis as needed on a day to day basis    #Afib  :: HR well controlled  - Continue Eliquis 2.5 mg BID     #Pulm  #Hx of Lung cancer s/p LLL lobectomy in 2018  :: Residual PTX seen on CT and CXR  - Stable and satting well on RA     #Renal  #BEKA on CKD stage 3  #BPH  :: Baseline Cr 1.3-1.6  :: Cr elevated at 2.34 on arrival   :: FeNa consistent with pre-renal, likely from poor renal perfusion from systemic venodilation  - will continue to monitor  - start home tamzulosin 0.4mg PO  - will start tadalafil in the next few days  - will re-address sherwood daily  - urology consult    #GI  #Chronic constipation   - aggressive bowel regimen    #ID  #Septic Shock 2/2 UTI  :: UA concerning for UTI + UTI symptoms + febrile + leukocytosis  :: prior bacteremia w/ Streptococcus dysgalactiae Oct/2023  :: no positive urine cultures from prior  :: urine culture: pseudomonas rianna sensitive  :: CT chest mostly un-revealing   - c/w pip/tazo w/ plans for 3 days and later switch to PO regimen  - ID consult per wife request given multiple sepsis episodes  - will continue to follow cultures    #Endo  #Hypothyroidism  ::TSH 7.36, fT4 1.11 wnl  -Continue home levothyroxine 88mcg    #Hyperparathyroidism  - Was recently told to stop cincalcet per patients wife    F: PRN  E: PRN  N: Cardiac diet  A: R radial A-line; RIJ-> Plan to remove  DVT ppx: Home Eliquis  GI ppx: PPI  Abx: Pip/tazo  O2: RA  Gtts: None  Code Status: DNAR/DNI confirmed on arrival   NOK: Elsa Jordan [phone]            Abilio Fonseca MD

## 2024-03-21 NOTE — PROGRESS NOTES
Occupational Therapy    Occupational Therapy Treatment    Name: Brady Ortega  MRN: [id number]  : 1945  Date: 24  Time Calculation  Start Time: 912  Stop Time: 941  Time Calculation (min): 29 min    Assessment:  OT Assessment: Decreased strength, coordination, balance, strength, and endurance. Would benefit from skilled serives to maximize functional potential.  Prognosis: Good  Evaluation/Treatment Tolerance: Patient tolerated treatment well  End of Session Communication: Bedside nurse  End of Session Patient Position: Up in chair, Alarm off, not on at start of session  Plan:  Treatment Interventions: ADL retraining, Functional transfer training, UE strengthening/ROM, Endurance training, Neuromuscular reeducation, Fine motor coordination activities  OT Frequency: 3 times per week  OT Discharge Recommendations: Moderate intensity level of continued care  Equipment Recommended upon Discharge: Wheeled walker  OT Recommended Transfer Status: Assist of 2  OT - OK to Discharge: Yes    Subjective   Previous Visit Info:  OT Last Visit  OT Received On: 24  General:  General  Family/Caregiver Present: Yes  Caregiver Feedback: wife and daughter present and supportive  Co-Treatment: PT  Co-Treatment Reason: To maximize pt safety and mobility  Prior to Session Communication: Bedside nurse  Patient Position Received: Bed, 3 rail up, Alarm off, not on at start of session  General Comment: Pt puts forth good effort. Improved endurance and active participation as compared to previous session.  Precautions:  Medical Precautions: Cardiac precautions, Fall precautions  Vitals:  Vital Signs  Heart Rate: 67 (78)  Resp: 17 (16)  SpO2: 97 % (100)  BP: (!) 93/43 (114/55)  MAP (mmHg): 62 (78)  BP Method: Arterial line  Pain Assessment:  Pain Assessment  Pain Assessment: 0-10  Pain Score: 0 - No pain     Objective   Activities of Daily Living:      Grooming  Grooming Comments: SBA to wipe face and UB using wet wipe.  MAX A for back.    LE Dressing  LE Dressing: Yes  LE Dressing Comments: MAX A to don socks, AFO, and shoes         Therapy/Activity: Therapeutic Exercise  Therapeutic Exercise Performed: Yes  Therapeutic Exercise Activity 1: While seated EOB: AROM for neck ext and shld retraction with passive stretch at end range. x5reps.    Therapeutic Activity  Therapeutic Activity Performed: Yes  Therapeutic Activity 1: Supine->sit wit hCGA and increased time  Therapeutic Activity 2: Pt able to take a few steps to transfer bed->chair. Completed with MOD A and use of FWW. Cues for retro and posture.  Therapeutic Activity 3: Static stand for approx 30sec x2 trials with seated rest. Completed with MOD A x2 and FWW.    Balance/Neuromuscular Re-Education  Balance/Neuromuscular Re-Education Activity Performed: Yes  Balance/Neuromuscular Re-Education Activity 1: Sit<>stand 5x with MOD A x2 to FWW. Cues for posture, standing balance, and technique.  Balance/Neuromuscular Re-Education Activity 2: While standing, BUE reach to target in various places. Needed MIN A to maintain stand balance.    Outcome Measures:  [facility] Daily Activity  Putting on and taking off regular lower body clothing: A lot  Bathing (including washing, rinsing, drying): A lot  Putting on and taking off regular upper body clothing: A little  Toileting, which includes using toilet, bedpan or urinal: A lot  Taking care of personal grooming such as brushing teeth: A little  Eating Meals: A little  Daily Activity - Total Score: 15        Education Documentation  Body Mechanics, taught by Juana Dean OT at 3/21/2024  1:27 PM.  Learner: Patient  Readiness: Acceptance  Method: Explanation  Response: Verbalizes Understanding, Demonstrated Understanding    ADL Training, taught by Juana Dean OT at 3/21/2024  1:27 PM.  Learner: Patient  Readiness: Acceptance  Method: Explanation  Response: Verbalizes Understanding, Demonstrated Understanding    Body Mechanics, taught by  Juana Dean OT at 3/20/2024  3:26 PM.  Learner: Patient  Readiness: Acceptance  Method: Explanation  Response: Verbalizes Understanding    ADL Training, taught by Juana Dean OT at 3/20/2024  3:26 PM.  Learner: Patient  Readiness: Acceptance  Method: Explanation  Response: Verbalizes Understanding    Education Comments  No comments found.      Goals:  Encounter Problems       Encounter Problems (Active)       ADLs       Patient will complete lower body dressing with MIN A for donning and doffing all LE clothes in order to increase Indep with task participation.        Start:  03/20/24    Expected End:  04/10/24            Patient will complete toileting, including clothing management and hygiene, with MIN A in order to maximize functional Indep with task completion.        Start:  03/20/24    Expected End:  04/10/24               BALANCE       Pt will increase static/dynamic sit/stand to Good to increase safety and indep with functional task completion.         Start:  03/20/24    Expected End:  04/10/24               EXERCISE/STRENGTHENING       Pt will increase overall BUE strength to 4/5 in order to increase functional task participation.        Start:  03/20/24    Expected End:  04/10/24            Pt will demo increased BUE fine motor/bimanual coordination in order to achieve MOD I with functional task completion.        Start:  03/20/24    Expected End:  04/10/24            Pt will increase endurance to tolerate 15-20min of activity with no more than 1 rest break in order to increase ability to engage in ADL completion.        Start:  03/20/24    Expected End:  04/10/24               TRANSFERS       Patient will complete functional transfers using least restrictive device with MIN A in order to maximize functional potential and increase safety.        Start:  03/20/24    Expected End:  04/10/24

## 2024-03-22 LAB
ALBUMIN SERPL BCP-MCNC: 2.7 G/DL (ref 3.4–5)
ANION GAP SERPL CALC-SCNC: 16 MMOL/L (ref 10–20)
BUN SERPL-MCNC: 43 MG/DL (ref 6–23)
CALCIUM SERPL-MCNC: 7.7 MG/DL (ref 8.6–10.6)
CHLORIDE SERPL-SCNC: 100 MMOL/L (ref 98–107)
CO2 SERPL-SCNC: 22 MMOL/L (ref 21–32)
CREAT SERPL-MCNC: 1.99 MG/DL (ref 0.5–1.3)
EGFRCR SERPLBLD CKD-EPI 2021: 33 ML/MIN/1.73M*2
ERYTHROCYTE [DISTWIDTH] IN BLOOD BY AUTOMATED COUNT: 15.4 % (ref 11.5–14.5)
GLUCOSE SERPL-MCNC: 104 MG/DL (ref 74–99)
HCT VFR BLD AUTO: 28.6 % (ref 41–52)
HGB BLD-MCNC: 9.5 G/DL (ref 13.5–17.5)
MAGNESIUM SERPL-MCNC: 1.84 MG/DL (ref 1.6–2.4)
MCH RBC QN AUTO: 29 PG (ref 26–34)
MCHC RBC AUTO-ENTMCNC: 33.2 G/DL (ref 32–36)
MCV RBC AUTO: 87 FL (ref 80–100)
NRBC BLD-RTO: 0 /100 WBCS (ref 0–0)
PHOSPHATE SERPL-MCNC: 2.3 MG/DL (ref 2.5–4.9)
PLATELET # BLD AUTO: 195 X10*3/UL (ref 150–450)
POTASSIUM SERPL-SCNC: 3.6 MMOL/L (ref 3.5–5.3)
RBC # BLD AUTO: 3.28 X10*6/UL (ref 4.5–5.9)
SODIUM SERPL-SCNC: 134 MMOL/L (ref 136–145)
WBC # BLD AUTO: 9.2 X10*3/UL (ref 4.4–11.3)

## 2024-03-22 PROCEDURE — 2500000002 HC RX 250 W HCPCS SELF ADMINISTERED DRUGS (ALT 637 FOR MEDICARE OP, ALT 636 FOR OP/ED)

## 2024-03-22 PROCEDURE — 97110 THERAPEUTIC EXERCISES: CPT | Mod: GP

## 2024-03-22 PROCEDURE — 36415 COLL VENOUS BLD VENIPUNCTURE: CPT | Performed by: STUDENT IN AN ORGANIZED HEALTH CARE EDUCATION/TRAINING PROGRAM

## 2024-03-22 PROCEDURE — 1200000002 HC GENERAL ROOM WITH TELEMETRY DAILY

## 2024-03-22 PROCEDURE — 2500000004 HC RX 250 GENERAL PHARMACY W/ HCPCS (ALT 636 FOR OP/ED)

## 2024-03-22 PROCEDURE — 97116 GAIT TRAINING THERAPY: CPT | Mod: GP

## 2024-03-22 PROCEDURE — 82306 VITAMIN D 25 HYDROXY: CPT

## 2024-03-22 PROCEDURE — 97530 THERAPEUTIC ACTIVITIES: CPT | Mod: GO

## 2024-03-22 PROCEDURE — 85027 COMPLETE CBC AUTOMATED: CPT | Performed by: STUDENT IN AN ORGANIZED HEALTH CARE EDUCATION/TRAINING PROGRAM

## 2024-03-22 PROCEDURE — 83735 ASSAY OF MAGNESIUM: CPT | Performed by: STUDENT IN AN ORGANIZED HEALTH CARE EDUCATION/TRAINING PROGRAM

## 2024-03-22 PROCEDURE — 2500000004 HC RX 250 GENERAL PHARMACY W/ HCPCS (ALT 636 FOR OP/ED): Performed by: STUDENT IN AN ORGANIZED HEALTH CARE EDUCATION/TRAINING PROGRAM

## 2024-03-22 PROCEDURE — 2500000002 HC RX 250 W HCPCS SELF ADMINISTERED DRUGS (ALT 637 FOR MEDICARE OP, ALT 636 FOR OP/ED): Performed by: STUDENT IN AN ORGANIZED HEALTH CARE EDUCATION/TRAINING PROGRAM

## 2024-03-22 PROCEDURE — 99291 CRITICAL CARE FIRST HOUR: CPT | Performed by: INTERNAL MEDICINE

## 2024-03-22 PROCEDURE — 80069 RENAL FUNCTION PANEL: CPT | Performed by: STUDENT IN AN ORGANIZED HEALTH CARE EDUCATION/TRAINING PROGRAM

## 2024-03-22 PROCEDURE — 2500000001 HC RX 250 WO HCPCS SELF ADMINISTERED DRUGS (ALT 637 FOR MEDICARE OP)

## 2024-03-22 RX ORDER — MAGNESIUM SULFATE HEPTAHYDRATE 40 MG/ML
2 INJECTION, SOLUTION INTRAVENOUS ONCE
Status: COMPLETED | OUTPATIENT
Start: 2024-03-22 | End: 2024-03-22

## 2024-03-22 RX ORDER — POTASSIUM CHLORIDE 20 MEQ/1
20 TABLET, EXTENDED RELEASE ORAL ONCE
Status: COMPLETED | OUTPATIENT
Start: 2024-03-22 | End: 2024-03-22

## 2024-03-22 RX ADMIN — CARBIDOPA AND LEVODOPA 2 TABLET: 25; 100 TABLET ORAL at 15:12

## 2024-03-22 RX ADMIN — AZELASTINE HYDROCHLORIDE 2 SPRAY: 137 SPRAY, METERED NASAL at 20:12

## 2024-03-22 RX ADMIN — PIPERACILLIN SODIUM AND TAZOBACTAM SODIUM 2.25 G: 2; .25 INJECTION, SOLUTION INTRAVENOUS at 12:18

## 2024-03-22 RX ADMIN — APIXABAN 2.5 MG: 2.5 TABLET, FILM COATED ORAL at 08:03

## 2024-03-22 RX ADMIN — ACETAMINOPHEN 650 MG: 325 TABLET ORAL at 08:00

## 2024-03-22 RX ADMIN — POLYETHYLENE GLYCOL 3350 17 G: 17 POWDER, FOR SOLUTION ORAL at 20:12

## 2024-03-22 RX ADMIN — PIPERACILLIN SODIUM AND TAZOBACTAM SODIUM 2.25 G: 2; .25 INJECTION, SOLUTION INTRAVENOUS at 18:28

## 2024-03-22 RX ADMIN — CARBIDOPA AND LEVODOPA 2 TABLET: 25; 100 TABLET ORAL at 11:35

## 2024-03-22 RX ADMIN — TAMSULOSIN HYDROCHLORIDE 0.4 MG: 0.4 CAPSULE ORAL at 08:02

## 2024-03-22 RX ADMIN — POLYETHYLENE GLYCOL 3350 17 G: 17 POWDER, FOR SOLUTION ORAL at 08:02

## 2024-03-22 RX ADMIN — ACETAMINOPHEN 650 MG: 325 TABLET ORAL at 22:44

## 2024-03-22 RX ADMIN — MAGNESIUM SULFATE HEPTAHYDRATE 2 G: 40 INJECTION, SOLUTION INTRAVENOUS at 08:10

## 2024-03-22 RX ADMIN — PIPERACILLIN SODIUM AND TAZOBACTAM SODIUM 2.25 G: 2; .25 INJECTION, SOLUTION INTRAVENOUS at 23:41

## 2024-03-22 RX ADMIN — IPRATROPIUM BROMIDE 2 SPRAY: 21 SPRAY, METERED NASAL at 20:12

## 2024-03-22 RX ADMIN — APIXABAN 2.5 MG: 2.5 TABLET, FILM COATED ORAL at 20:12

## 2024-03-22 RX ADMIN — PIPERACILLIN SODIUM AND TAZOBACTAM SODIUM 2.25 G: 2; .25 INJECTION, SOLUTION INTRAVENOUS at 06:44

## 2024-03-22 RX ADMIN — IPRATROPIUM BROMIDE 2 SPRAY: 21 SPRAY, METERED NASAL at 09:00

## 2024-03-22 RX ADMIN — PIPERACILLIN SODIUM AND TAZOBACTAM SODIUM 2.25 G: 2; .25 INJECTION, SOLUTION INTRAVENOUS at 00:13

## 2024-03-22 RX ADMIN — LEVOTHYROXINE SODIUM 88 MCG: 0.09 TABLET ORAL at 06:44

## 2024-03-22 RX ADMIN — CARBIDOPA AND LEVODOPA 0.5 TABLET: 25; 100 TABLET ORAL at 18:58

## 2024-03-22 RX ADMIN — POTASSIUM CHLORIDE 20 MEQ: 1500 TABLET, EXTENDED RELEASE ORAL at 08:03

## 2024-03-22 RX ADMIN — AZELASTINE HYDROCHLORIDE 2 SPRAY: 137 SPRAY, METERED NASAL at 08:21

## 2024-03-22 RX ADMIN — CARBIDOPA AND LEVODOPA 2 TABLET: 25; 100 TABLET ORAL at 06:44

## 2024-03-22 RX ADMIN — CARBIDOPA AND LEVODOPA 2 TABLET: 25; 100 TABLET ORAL at 18:58

## 2024-03-22 RX ADMIN — PANTOPRAZOLE SODIUM 40 MG: 40 TABLET, DELAYED RELEASE ORAL at 06:52

## 2024-03-22 ASSESSMENT — COGNITIVE AND FUNCTIONAL STATUS - GENERAL
MOBILITY SCORE: 12
DRESSING REGULAR UPPER BODY CLOTHING: A LOT
EATING MEALS: A LITTLE
HELP NEEDED FOR BATHING: A LOT
DRESSING REGULAR UPPER BODY CLOTHING: A LITTLE
MOVING TO AND FROM BED TO CHAIR: A LOT
CLIMB 3 TO 5 STEPS WITH RAILING: A LOT
MOBILITY SCORE: 12
PERSONAL GROOMING: A LITTLE
WALKING IN HOSPITAL ROOM: A LOT
STANDING UP FROM CHAIR USING ARMS: A LOT
DRESSING REGULAR LOWER BODY CLOTHING: A LOT
MOVING TO AND FROM BED TO CHAIR: A LOT
DAILY ACTIVITIY SCORE: 14
STANDING UP FROM CHAIR USING ARMS: A LOT
DRESSING REGULAR LOWER BODY CLOTHING: A LOT
DAILY ACTIVITIY SCORE: 15
TOILETING: A LOT
EATING MEALS: A LITTLE
MOVING FROM LYING ON BACK TO SITTING ON SIDE OF FLAT BED WITH BEDRAILS: A LOT
MOVING FROM LYING ON BACK TO SITTING ON SIDE OF FLAT BED WITH BEDRAILS: A LOT
CLIMB 3 TO 5 STEPS WITH RAILING: A LOT
TURNING FROM BACK TO SIDE WHILE IN FLAT BAD: A LOT
HELP NEEDED FOR BATHING: A LOT
TURNING FROM BACK TO SIDE WHILE IN FLAT BAD: A LOT
TOILETING: A LOT
PERSONAL GROOMING: A LITTLE
WALKING IN HOSPITAL ROOM: A LOT

## 2024-03-22 ASSESSMENT — PAIN SCALES - GENERAL
PAINLEVEL_OUTOF10: 3
PAINLEVEL_OUTOF10: 0 - NO PAIN
PAINLEVEL_OUTOF10: 0 - NO PAIN
PAINLEVEL_OUTOF10: 5 - MODERATE PAIN
PAINLEVEL_OUTOF10: 2
PAINLEVEL_OUTOF10: 0 - NO PAIN

## 2024-03-22 ASSESSMENT — PAIN - FUNCTIONAL ASSESSMENT
PAIN_FUNCTIONAL_ASSESSMENT: 0-10

## 2024-03-22 ASSESSMENT — PAIN DESCRIPTION - LOCATION: LOCATION: FINGER (COMMENT WHICH ONE)

## 2024-03-22 NOTE — PROGRESS NOTES
Physical Therapy    Physical Therapy Treatment    Patient Name: Brady Ortega  MRN: [id number]  Today's Date: 3/22/2024  Time Calculation  Start Time: 1325  Stop Time: 1405  Time Calculation (min): 40 min       Assessment/Plan   PT Assessment  End of Session Communication: Bedside nurse  End of Session Patient Position:  (On bed pan seated EOB with RN. All lines intact)     PT Plan  Treatment/Interventions: Bed mobility, Transfer training, Gait training, Stair training, Balance training, Neuromuscular re-education, Strengthening, Endurance training, Therapeutic exercise, Therapeutic activity  PT Plan: Skilled PT  PT Frequency: 4 times per week  PT Discharge Recommendations: Moderate intensity level of continued care  PT Recommended Transfer Status: Assist x2  PT - OK to Discharge: Yes      General Visit Information:   PT  Visit  PT Received On: 03/22/24  Prior to Session Communication: Bedside nurse  Patient Position Received: Up in chair, Alarm off, not on at start of session, Alarm off, caregiver present  Family/Caregiver Present: Yes  Caregiver Feedback: wife present and supportive  General Comment: Patient up in chair upon PT arrival. Pleasant and agreeable to session.     Subjective   Precautions:  Precautions  Medical Precautions: Cardiac precautions, Fall precautions  Vital Signs:  Vital Signs  Heart Rate:  (pre 66 post 73)  Heart Rate Source: Monitor  Resp:  (pre 16 post 19)  SpO2:  (pre 100 post 98)  BP:  (pre 81/64 (71) post 91/64 (75))    Objective   Pain:  Pain Assessment  Pain Assessment: 0-10  Pain Score: 0 - No pain  Cognition:     Lines/Tubes/Drains:  Urethral Catheter Coude 14 Fr. (Active)   Number of days: 2         PT Treatments:  Therapeutic Exercise  Therapeutic Exercise Performed: Yes  Therapeutic Exercise Activity 1: Seated in chair: 3x10 LAQ, HF, ankle pumps, hip abduction (against manual resistance), hip adduction to pillow BLEs  Therapeutic Exercise Activity 2: x5 sit to/from stand for  strengthening/ endurance to FWW with mod assist x1. Cues for fwd trunk lean as Pt retropulsive and cues for hand placement  Therapeutic Activity  Therapeutic Activity Performed: Yes  Therapeutic Activity 1: sit to/from stand: to FWW with mod assist x1        Ambulation/Gait Training  Ambulation/Gait Training Performed: Yes  Ambulation/Gait Training 1  Surface 1: Level tile  Device 1: Rolling walker  Assistance 1: Moderate assistance  Quality of Gait 1: Narrow base of support, Shuffling gait, Decreased step length (decreased cadenece)  Comments/Distance (ft) 1: 30 (intermittent PVCs throughout, otherwise vitals stable throughout)                Outcome Measures:  [facility] Basic Mobility  Turning from your back to your side while in a flat bed without using bedrails: A lot  Moving from lying on your back to sitting on the side of a flat bed without using bedrails: A lot  Moving to and from bed to chair (including a wheelchair): A lot  Standing up from a chair using your arms (e.g. wheelchair or bedside chair): A lot  To walk in hospital room: A lot  Climbing 3-5 steps with railing: A lot  Basic Mobility - Total Score: 12           FSS-ICU  Ambulation: Walks <50 feet with any assistance x1 or walks any distance with assistance x2 people  Rolling: Moderate assistance (performs 50 - 74% of task)  Sitting: Moderate assistance (performs 50 - 74% of task)  Transfer Sit-to-Stand: Moderate assistance (performs 50 - 74% of task)  Transfer Supine-to-Sit: Supervision or set-up only  Total Score: 15                Education Documentation  Mobility Training, taught by Magdalena Garcia PT at 3/22/2024  3:11 PM.  Learner: Patient  Readiness: Acceptance  Method: Explanation  Response: Verbalizes Understanding    Education Comments  No comments found.          OP EDUCATION:       Encounter Problems       Encounter Problems (Active)       Balance       Pt will be able to score >24 on Tinetti for low risk of falls (Progressing)       Start:   03/20/24    Expected End:  04/03/24               Mobility       Patient will ambulate >100 ft with FWW and CGA  (Progressing)       Start:  03/20/24    Expected End:  04/03/24            Patient will ascend and descend 12 stairs with handrail and cane with CGA (Progressing)       Start:  03/20/24    Expected End:  04/03/24               PT Transfers       Patient will perform bed mobility with supervision (Progressing)       Start:  03/20/24    Expected End:  04/03/24            Patient will transfer sit to and from stand with LRAD and CGA (Progressing)       Start:  03/20/24    Expected End:  04/03/24 03/22/24 at 3:13 PM   CASSIUS LITTLEJOHN, PT   Rehab Office: [phone]

## 2024-03-22 NOTE — PROGRESS NOTES
Spiritual Care Visit     People who were present: Patient, patient's wife, Elsa, and patient's daughter, Ana M along with Palliative CNP and SW    Topics discussed: Patient's condition, recent health experiences, possible paths for discharge, coping, systems of support. Today is the patient's birthday! Patient would prefer to be celebrating at home with his dog, Collette.    Interventions: Provided non-anxious, supportive presence, reflective listening    Plan of Care: Palliative  to provide ongoing spiritual care

## 2024-03-22 NOTE — PROGRESS NOTES
Social Work Discharge information:   SW received a call from Mariza from [facility].    Phone number: [phone]  Treatment: PT/OT/Nurse.   Please contact when the patient is discharged.   PARRISH Greer  UPDATE:   Anticipating patient will go home when medically ready.   Clinical information was sent to the preferred [facility] agency  PARRISH Greer

## 2024-03-22 NOTE — PROGRESS NOTES
Occupational Therapy    Occupational Therapy Treatment    Name: Brady Ortega  MRN: [id number]  : 1945  Date: 24  Time Calculation  Start Time: 1121  Stop Time: 1200  Time Calculation (min): 39 min    Assessment:  OT Assessment: Decreased strength, coordination, balance, strength, and endurance. Would benefit from skilled serives to maximize functional potential.  Prognosis: Good  Evaluation/Treatment Tolerance: Patient tolerated treatment well  End of Session Communication: Bedside nurse  End of Session Patient Position: Up in chair, Alarm off, not on at start of session  Plan:  Treatment Interventions: ADL retraining, Functional transfer training, UE strengthening/ROM, Endurance training, Neuromuscular reeducation, Compensatory technique education, Fine motor coordination activities  OT Frequency: 3 times per week  OT Discharge Recommendations: Moderate intensity level of continued care  Equipment Recommended upon Discharge: Wheeled walker  OT Recommended Transfer Status: Moderate assist  OT - OK to Discharge: Yes    Subjective   General:  OT Last Visit  OT Received On: 24  Prior to Session Communication: Bedside nurse  Patient Position Received: Bed, 3 rail up, Alarm off, not on at start of session  Family/Caregiver Present: Yes  Caregiver Feedback:  (Wife present during session)  General Comment: Pt pleasant and cooperative. Puts forth good effort. Increased functional mobility this date.   Precautions:  Medical Precautions: Cardiac precautions, Fall precautions  Vitals:  Vital Signs  Heart Rate: 64 (75)  Resp: 15 (13)  SpO2: 99 % (100)  BP: 100/58 (99/53)  MAP (mmHg): 72 (67)  Lines/Tubes/Drains:  Urethral Catheter Coude 14 Fr. (Active)   Number of days: 2       Cognition:  Overall Cognitive Status: Within Functional Limits    Pain Assessment:  Pain Assessment  Pain Assessment: 0-10  Pain Score: 0 - No pain     Objective   Activities of Daily Living:               LE Dressing  LE Dressing  Comments: MAX A to don socks, shoes, and AFO    Toileting  Toileting Comments: MAX A for john care after BM on bedpan. Rolling to both sides with MOD A and cues for UE use on bedrails.         Therapy/Activity:      Therapeutic Activity  Therapeutic Activity Performed: Yes  Therapeutic Activity 1: Supine->sit with MIN A and increased time  Therapeutic Activity 2: Sit<>stand 3x to FWW. Completed with MOD A and cues for technique. Retropulsive upon initial sit<>stand.  Therapeutic Activity 3: Pt ambulated through room approx 15' using FWW. Completed with MIN A. Consistent cues for task sequencing and posture. Retropulsive at times requiring verbal/tactile cues to correct. Slow pace.  Therapeutic Activity 4: Static stand for approx 5min with FWW. CGA-MIN A for instances of retro. Cues and assist to self-correct.         Outcome Measures:  [facility] Daily Activity  Putting on and taking off regular lower body clothing: A lot  Bathing (including washing, rinsing, drying): A lot  Putting on and taking off regular upper body clothing: A little  Toileting, which includes using toilet, bedpan or urinal: A lot  Taking care of personal grooming such as brushing teeth: A little  Eating Meals: A little  Daily Activity - Total Score: 15     Education Documentation  Body Mechanics, taught by Juana Dean OT at 3/22/2024  2:15 PM.  Learner: Patient  Readiness: Acceptance  Method: Explanation  Response: Verbalizes Understanding    ADL Training, taught by Juana Dean OT at 3/22/2024  2:15 PM.  Learner: Patient  Readiness: Acceptance  Method: Explanation  Response: Verbalizes Understanding    Education Comments  No comments found.        Goals:  Encounter Problems       Encounter Problems (Active)       ADLs       Patient will complete lower body dressing with MIN A for donning and doffing all LE clothes in order to increase Indep with task participation.        Start:  03/20/24    Expected End:  04/10/24            Patient will  complete toileting, including clothing management and hygiene, with MIN A in order to maximize functional Indep with task completion.        Start:  03/20/24    Expected End:  04/10/24               BALANCE       Pt will increase static/dynamic sit/stand to Good to increase safety and indep with functional task completion.         Start:  03/20/24    Expected End:  04/10/24               EXERCISE/STRENGTHENING       Pt will increase overall BUE strength to 4/5 in order to increase functional task participation.        Start:  03/20/24    Expected End:  04/10/24            Pt will demo increased BUE fine motor/bimanual coordination in order to achieve MOD I with functional task completion.        Start:  03/20/24    Expected End:  04/10/24            Pt will increase endurance to tolerate 15-20min of activity with no more than 1 rest break in order to increase ability to engage in ADL completion.        Start:  03/20/24    Expected End:  04/10/24               TRANSFERS       Patient will complete functional transfers using least restrictive device with MIN A in order to maximize functional potential and increase safety.        Start:  03/20/24    Expected End:  04/10/24                     03/22/24 at 2:16 PM   Juana Dean, OT   [phone]

## 2024-03-22 NOTE — PROGRESS NOTES
Brady Ortega is a 79 y.o. male on day 3 of admission presenting with Shock (CMS/HCC).    Subjective   78 year old male with PMHX of Parkinsonism, CKD stage 3 (baseline 1.3-1.6), Afib on Eliquis, HTN, CAD, Hx of lung Ca s/p LLL lobectomy, HFpEF (EF 50-55% March/2024) 2/2 suspected cardiac amyloidosis, and recent admission to [facility] 2/16-2/22 for Sepsis 2/2 cellulitis of RLL who presented to the [facility] ED from home with hypotension and lethargy being treated for urosepsis shock. Patient off pressors since 3/21, Bcx 3/19 negativ, Ucx 3/18 with Pseudomona, plan to continue zosyn until 3/23 then transition PO for total 7 day course for complicated UTI. Lines removed and patient medically ready for the floor. Palliative care consulted for ongoing goals of care and home-going plans. Per discussions, family currently wishing patient to go home after discharge. Palliative helping with HHA and equipment at home. Urology evaluated and despite recurrent infections as family and patient having difficulty with CIC will maintain sherwood catheter at discharge.     Objective     Physical Exam  Vitals reviewed.   Constitutional:       Appearance: Normal appearance.   HENT:      Head: Normocephalic and atraumatic.      Mouth/Throat:      Mouth: Mucous membranes are moist.   Eyes:      Extraocular Movements: Extraocular movements intact.      Pupils: Pupils are equal, round, and reactive to light.   Cardiovascular:      Rate and Rhythm: Normal rate. Rhythm irregular.      Heart sounds: Murmur heard.   Pulmonary:      Effort: Pulmonary effort is normal.      Breath sounds: Normal breath sounds.   Abdominal:      General: Abdomen is flat.      Palpations: Abdomen is soft.   Musculoskeletal:         General: Normal range of motion.      Cervical back: Normal range of motion and neck supple.      Right lower leg: Edema present.      Left lower leg: Edema present.   Neurological:      General: No focal deficit present.      Mental Status:  "He is alert and oriented to person, place, and time.   Psychiatric:         Mood and Affect: Mood normal.         Last Recorded Vitals  Blood pressure 104/73, pulse 65, temperature 36 °C (96.8 °F), temperature source Temporal, resp. rate 12, height 1.6 m (5' 3\"), weight 71.9 kg (158 lb 8.2 oz), SpO2 100 %.  Intake/Output last 3 Shifts:  I/O last 3 completed shifts:  In: 835.5 (11.6 mL/kg) [P.O.:480; I.V.:55.5 (0.8 mL/kg); IV Piggyback:300]  Out: 1795 (25 mL/kg) [Urine:1795 (0.7 mL/kg/hr)]  Weight: 71.9 kg     Relevant Results    Scheduled medications  apixaban, 2.5 mg, oral, BID  azelastine, 2 spray, Each Nostril, BID  carbidopa-levodopa, 0.5 tablet, oral, Daily  carbidopa-levodopa, 2 tablet, oral, 4 times per day  ipratropium, 2 spray, Each Nostril, BID  levothyroxine, 88 mcg, oral, Daily before breakfast  lidocaine, 1 patch, transdermal, Daily  pantoprazole, 40 mg, oral, Daily before breakfast  perflutren lipid microspheres, 0.5-10 mL of dilution, intravenous, Once in imaging  piperacillin-tazobactam, 2.25 g, intravenous, q6h  polyethylene glycol, 17 g, oral, BID  tamsulosin, 0.4 mg, oral, Daily      Continuous medications     PRN medications  PRN medications: acetaminophen        Assessment/Plan   Principal Problem:    Shock (CMS/HCC)  Active Problems:    Physical deconditioning    Atrial fibrillation (CMS/HCC)    Parkinson's disease without dyskinesia or fluctuating manifestations    Heart failure with preserved ejection fraction (CMS/HCC)      78 year old male with PMHX of Parkinsonism, CKD stage 3 (baseline 1.3-1.6), Afib on Eliquis, HTN, CAD, Hx of lung Ca s/p LLL lobectomy, HFpEF (EF 50-55% March/2024) 2/2 suspected cardiac amyloidosis, and recent admission to [facility] 2/16-2/22 for Sepsis 2/2 cellulitis of RLL who presented to the [facility] ED from home with hypotension and lethargy being treated for urosepsis shock. Patient off pressors since 3/21, Bcx 3/19 negativ, Ucx 3/18 with Pseudomona, plan to continue zosyn " until 3/23 then transition PO for total 7 day course for complicated UTI. Lines removed and patient medically ready for the floor. Palliative care consulted for ongoing goals of care and home-going plans. Per discussions, family currently wishing patient to go home after discharge. Palliative helping with HHA and equipment at home. Urology evaluated and despite recurrent infections as family and patient having difficulty with CIC will maintain sherwood catheter at discharge.      3/22 Updates:  - Transferred to the floor   - Ongoing goals of care and home-going plans with palliative team. Family wishes patient to go home after discharge. Palliative helping with HHA and equipment at home.  - Urology consulted for urinary retention. Per discussion w urology and family plan to maintain sherwood upon discharge; needs follow up with urology as outpatient for urinary retention   - C/w zosyn until 3/23, transition to PO tomorrow, Cipro     #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   #HFpEF (EF 50-55% March/2024)  #HTN  #CAD  :: trop 237 and flat,   :: on home atorva, metolazone 2.5mg, torsemide 20mg alternating with 40mg  :: TTE 3/19: LVEF 50-55%; apical sparing of the apex, severe LVH; severe LA dilation; moderate RA dilation, moderate TR; + bubble study 2/2 PFO  :: SPEP, UPEP, and kappa jessica checked outpt and ruled out AL amyloid @ [facility]  :: ongoing discussions with wife and patient in regards to next steps, given low likelyhood of benefit in this patient  - will likely pursue PYP scan as outpatient or once patient improves  - stop atorva  - Diuresis PRN     #Septic Shock 2/2 UTI (resolved)  :: UA concerning for UTI + UTI symptoms + febrile + leukocytosis  :: prior bacteremia w/ Streptococcus dysgalactiae Oct/2023  :: no positive urine cultures from prior  :: urine culture: pseudomonas rianna sensitive  :: CT chest mostly un-revealing   - c/w pip/tazo w/ plans for 3 days (3/19-3/22) and later switch to PO regimen for total 7  days until 3/25  - Urine cx 3/19: pan-sensitive pseudomonas  - Palliative care team consulted for Ongoing goals of care and home-going plans.  - Maintain sherwood cath upon discharge for chronic urinary retention    #Parkinsonism  - c/w home carbidopa/levodopa 25/100 QID    #Afib  :: HR well controlled  - Currently in Afib, no RVR, not on rhytm or rate control  - Continue Eliquis 2.5 mg BID     #Hx of Lung cancer s/p LLL lobectomy in 2018  :: Residual PTX seen on CT and CXR  - Stable and satting well on RA     #BEKA on CKD stage 3  #BPH  #Chronic urinary retention  :: Hx of recurrent infections, attempted CIC in past but difficult for family and patient.  :: Baseline Cr 1.3-1.6  :: Cr elevated at 2.34 on arrival   :: FeNa consistent with pre-renal, likely from poor renal perfusion from systemic venodilation  - start home tamzulosin 0.4mg PO  - will start tadalafil in the next few days  - Urology consulted. Per family discussion w urology plan to maintain sherwood catheter at discharge for chronic urinary retention  -Needs follow up with outpatient urology       #Hypothyroidism  ::TSH 7.36, fT4 1.11 wnl  -Continue home levothyroxine 88mcg    #Hyperparathyroidism  - Was recently told to stop cincalcet per patients wife    F: PRN  E: PRN  N: Cardiac diet  A: PIV  DVT ppx: Home Eliquis  GI ppx: PPI  Abx: Pip/tazo  O2: RA  Gtts: None  Code Status: DNAR/DNI confirmed on arrival   NOK: Elsa Ortega [phone]               Paulette Vogt MD

## 2024-03-22 NOTE — PROGRESS NOTES
Brady Ortega is a 79 y.o. male on day 3 of admission presenting with Shock (CMS/HCC).    CICU 3/19-3/22 Hospital course:  Patient admitted to the CICU for septic shock on a poor substrate given cardiac amyloid. Patient started on broad spectrum Abx (vanc pip/tazo) and later vanc stopped d/t gram negative bacteria on urine. Patient off pressors on 3/21 2:00AM and urine culture speciated for pseudomonas (pan sensitive). Blood cultures 3/19 NG at 2 days. Plan to continue for three days Zosyn (3/19-3/22) and transition to PO following for total 7 day course for complicated UTI. Lines removed and patient medically ready for the floor. Palliative care consulted for ongoing goals of care and home-going plans. Per discussions, family currently wishing patient to go home after discharge. Palliative helping with HHA and equipment at home. Urology evaluated and despite recurrent infections as family and patient having difficulty with CIC will maintain sherwood catheter at discharge.    Subjective   No significant events overnight. BP stable off pressors.  Patient awake and interacting this morning and denying any chest pain, SOB, n/v, diarrhea. Has some mild finger tingling that is chronic from neuropathy.       Objective     Physical Exam  Physical Exam  Constitutional:       Appearance: Not in acute distress.  HENT:      Head: Normocephalic and atraumatic.   Eyes:      Extraocular Movements: Extraocular movements intact.      Pupils: Pupils are equal, round, and reactive to light.   Cardiovascular:      Rate and Rhythm: Normal rate. Rhythm irregular.      Heart sounds: Murmur heard.      No friction rub. No gallop.   Pulmonary:      Effort: Pulmonary effort is normal. No respiratory distress.      Breath sounds: No stridor. No wheezing or rales.   Abdominal:      General: Abdomen is flat. Bowel sounds are normal.      Palpations: Abdomen is soft.   Musculoskeletal:      Comments: Mild tremors noted. Rigidity on movement  "  Skin:     General: Skin is warm.      Capillary Refill: Capillary refill takes less than 2 seconds.   Neurological:      General: No focal deficit present.      Mental Status: He is alert.      Comments: Alert and oriented X3     Last Recorded Vitals  Blood pressure 94/69, pulse 74, temperature 36.1 °C (97 °F), temperature source Temporal, resp. rate 17, height 1.6 m (5' 3\"), weight 71.9 kg (158 lb 8.2 oz), SpO2 99 %.  Intake/Output last 3 Shifts:  I/O last 3 completed shifts:  In: 835.5 (11.6 mL/kg) [P.O.:480; I.V.:55.5 (0.8 mL/kg); IV Piggyback:300]  Out: 1795 (25 mL/kg) [Urine:1795 (0.7 mL/kg/hr)]  Weight: 71.9 kg     Relevant Results  Scheduled medications  apixaban, 2.5 mg, oral, BID  azelastine, 2 spray, Each Nostril, BID  carbidopa-levodopa, 0.5 tablet, oral, Daily  carbidopa-levodopa, 2 tablet, oral, 4 times per day  ipratropium, 2 spray, Each Nostril, BID  levothyroxine, 88 mcg, oral, Daily before breakfast  lidocaine, 1 patch, transdermal, Daily  pantoprazole, 40 mg, oral, Daily before breakfast  perflutren lipid microspheres, 0.5-10 mL of dilution, intravenous, Once in imaging  piperacillin-tazobactam, 2.25 g, intravenous, q6h  polyethylene glycol, 17 g, oral, BID  tamsulosin, 0.4 mg, oral, Daily      Continuous medications     PRN medications  PRN medications: acetaminophen    Results for orders placed or performed during the hospital encounter of 03/19/24 (from the past 24 hour(s))   CBC   Result Value Ref Range    WBC 9.2 4.4 - 11.3 x10*3/uL    nRBC 0.0 0.0 - 0.0 /100 WBCs    RBC 3.28 (L) 4.50 - 5.90 x10*6/uL    Hemoglobin 9.5 (L) 13.5 - 17.5 g/dL    Hematocrit 28.6 (L) 41.0 - 52.0 %    MCV 87 80 - 100 fL    MCH 29.0 26.0 - 34.0 pg    MCHC 33.2 32.0 - 36.0 g/dL    RDW 15.4 (H) 11.5 - 14.5 %    Platelets 195 150 - 450 x10*3/uL   Renal Function Panel   Result Value Ref Range    Glucose 104 (H) 74 - 99 mg/dL    Sodium 134 (L) 136 - 145 mmol/L    Potassium 3.6 3.5 - 5.3 mmol/L    Chloride 100 98 - 107 " mmol/L    Bicarbonate 22 21 - 32 mmol/L    Anion Gap 16 10 - 20 mmol/L    Urea Nitrogen 43 (H) 6 - 23 mg/dL    Creatinine 1.99 (H) 0.50 - 1.30 mg/dL    eGFR 33 (L) >60 mL/min/1.73m*2    Calcium 7.7 (L) 8.6 - 10.6 mg/dL    Phosphorus 2.3 (L) 2.5 - 4.9 mg/dL    Albumin 2.7 (L) 3.4 - 5.0 g/dL   Magnesium   Result Value Ref Range    Magnesium 1.84 1.60 - 2.40 mg/dL            This patient currently has cardiac telemetry ordered; if you would like to modify or discontinue the telemetry order, click here to go to the orders activity to modify/discontinue the order.                 Assessment/Plan   Principal Problem:    Shock (CMS/HCC)  Active Problems:    Physical deconditioning    Atrial fibrillation (CMS/HCC)    Parkinson's disease without dyskinesia or fluctuating manifestations    Heart failure with preserved ejection fraction (CMS/HCC)    Mr. Brady Ortega is an 78 year old male with PMHX of Parkinsonism, CKD stage 3 (baseline 1.3-1.6), Afib on Eliquis, HTN, CAD, Hx of lung Ca s/p LLL lobectomy, HFpEF (EF 50-55% March/2024) 2/2 suspected cardiac amyloidosis, and recent admission to [facility] 2/16-2/22 for Sepsis 2/2 cellulitis of RLL who presented to the [facility] ED from home with hypotension and lethargy being treated for urosepsis shock. Patient off pressors since 3/21 and improved leukocytosis with plan to treat for pseudomonas UTI. Due to recurrent admissions from repeat infections palliative care engaged for discharge planning. Patient otherwise hemodynamically stable and ready for the floor.     3/22 Updates:  - Awaiting transfer to floor  - Ongoing goals of care and home-going plans with palliative team. Family wishes patient to go home after discharge. Palliative helping with HHA and equipment at home.  - Urology consulted for urinary retention. Per discussion w urology and family plan to maintain sherwood upon discharge  - C/w pip/tazo 3/19-3/22 for pseudomonas UTI and transition to PO abx tomorrow for total 7 day  course until 3/25       #Neuro  #Parkinsonism  - c/w home carbidopa/levodopa 25/100 QID    #Acute Encephalopathy - Resolved  - Likely in the setting of UTI  - Patient A&Ox4 on exam on arrival to CICU     #CV     #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   #HFpEF (EF 50-55% March/2024)  #HTN  #CAD  :: trop 237 and flat,   :: on home atorva, metolazone 2.5mg, torsemide 20mg alternating with 40mg  :: TTE 3/19: LVEF 50-55%; apical sparing of the apex, severe LVH; severe LA dilation; moderate RA dilation, moderate TR; + bubble study 2/2 PFO  :: SPEP, UPEP, and kappa jessica checked outpt and ruled out AL amyloid @ [facility]  :: ongoing discussions with wife and patient in regards to next steps, given low likelyhood of benefit in this patient  - will likely pursue PYP scan as outpatient or once patient improves  - stop atorva  - will consider diuresis as needed on a day to day basis    #Afib  :: HR well controlled  - Continue Eliquis 2.5 mg BID     #Pulm  #Hx of Lung cancer s/p LLL lobectomy in 2018  :: Residual PTX seen on CT and CXR  - Stable and satting well on RA     #Renal  #BEKA on CKD stage 3  #BPH  #Chronic urinary retention  :: Hx of recurrent infections, attempted CIC in past but difficult for family and patient.  :: Baseline Cr 1.3-1.6  :: Cr elevated at 2.34 on arrival   :: FeNa consistent with pre-renal, likely from poor renal perfusion from systemic venodilation  - start home tamzulosin 0.4mg PO  - will start tadalafil in the next few days  - Urology consulted. Per family discussion w urology plan to maintain sherwood catheter at discharge for chronic urinary retention    #GI  #Chronic constipation   - aggressive bowel regimen    #ID  #Septic Shock 2/2 UTI  :: UA concerning for UTI + UTI symptoms + febrile + leukocytosis  :: prior bacteremia w/ Streptococcus dysgalactiae Oct/2023  :: no positive urine cultures from prior  :: urine culture: pseudomonas rianna sensitive  :: CT chest mostly un-revealing   - c/w pip/tazo w/  plans for 3 days (3/19-3/22) and later switch to PO regimen for total 7 days until 3/25  - Urine cx 3/19: pan-sensitive pseudomonas  - Palliative care team consulted for Ongoing goals of care and home-going plans.  - Maintain sherwood cath upon discharge for chronic urinary retention    #Endo  #Hypothyroidism  ::TSH 7.36, fT4 1.11 wnl  -Continue home levothyroxine 88mcg    #Hyperparathyroidism  - Was recently told to stop cincalcet per patients wife    F: PRN  E: PRN  N: Cardiac diet  A: R radial A-line; RIJ-> Plan to remove  DVT ppx: Home Eliquis  GI ppx: PPI  Abx: Pip/tazo  O2: RA  Gtts: None  Code Status: DNAR/DNI confirmed on arrival   NOK: Elsa Ortega [phone]           Irene Mckeon MD

## 2024-03-22 NOTE — CONSULTS
Inpatient consult to Palliative Care  Consult performed by: XI Mo-CNP  Consult ordered by: Rigoberto Hernández MD    Palliative Medicine Consult  Complex medical decision making, symptom management, patient/family support    History obtained from chart review including ED note, H&P, patient's daily progress notes, review of lab/test results, and discussion with primary team and bedside RN.    Subjective    History of Present Illness  Brady Ortega is a 79 y.o. male with past medical history of Parkinsonism, CKD stage 3 (baseline 1.3-1.6), Afib on Eliquis, HTN, CAD, Hx of lung Ca s/p LLL lobectomy, HFpEF (EF 50-55% March/2024) 2/2 suspected cardiac amyloidosis, and recent admission to [facility] 2/16-2/22 for Sepsis 2/2 cellulitis of RLL who presented to the [facility] ED from home with hypotension and lethargy being treated for urosepsis shock. Patient off pressors since 03/21/24, Blood culture 03/19/2024 negative, Urine culture 03/18/2024 with Pseudomona, plan to continue Zosyn IV until 03/23/2024 then transition PO for total 7 day course for complicated UTI. Lines removed and patient medically ready for the floor. Urology evaluated and despite recurrent infections as family and patient having difficulty with CIC will maintain sherwood catheter at discharge due to ongoing urinary retention. Palliative Care consulted for ongoing goals of care and discharge plans.     Introduction to Palliative Care  Met with patient, patient's wife Elsa Ortega, and patient's daughter Ana M at bedside.   Patient alert and oriented, has capacity to make their own medical decisions at this time.   Surrogate decision maker is patient's wife Elsa Ortega.  Staff present: Mary Youssef Palliative Care YOEL, Mary Jo Patiño Palliative SW, and Rev. Shanell Thompson Palliative Care    Palliative Medicine was introduced as a specialty service for patients with serious illness to help with symptom management, improve quality  of life, assist with goals of care conversations, navigate complex decision making, and provide support to patients and families. Support and empathy was provided throughout the encounter. Provided reflective listening and presence.     Symptoms  [place] Symptom Assessment System  Pain Score: 5 - Moderate pain  Pain: Pt denies currently.  Dyspnea: Pt denies.  Fatigue: Pt denies.  Insomnia: Pt reports trouble sleeping in hospital setting. Pt reports he was awake a lot last night.   Drowsiness: Pt denies.   Constipation: Pt denies.  Nausea: Pt denies.  Appetite: Pt reports hungry for his special birthday dinner (steak) he had planned with his wife prior to hospitalization.   Anxiety: Pt denies.  Depression: Pt denies.     BM in last 48 hours? yes pt had a large BM today 03/22/2024    Palliative Medicine Social History:  The patient is  to spouse Elsa. They have 2 children (daughters and 1 daughter passed away tragically due to massive infection in ICU setting 3 years ago). The patient lives with his wife at home. The patient retired. The pt is economically stable. Pt denies use of alcohol, tobacco or drugs. The pt's mobility requires assistive device walker and denies recent history of falls. The patient spends most of the day talking to family, spending time with his wife, daughter, and 2 grandchildren. Pt sees their PCP and other specialists regularly. Hobbies were going to [employer] in [place], but since illness has progressed, pt is no longer able since October 2023. For enjoyment, the pt loved being active with his community at [employer] in [place], [place]. Coping methods are latha and family. Denies any safety concerns.    Spiritual History:  Are you spiritual or Baptist? Yes   What’s your latha background? Baptist  During difficult times in your life, what have you relied on for strength? Latha and Family (his wife)    Music History:  Do you enjoy music? YES What type of music do you enjoy? All  "kinds     Personal/Social History  He reports that he quit smoking about 59 years ago. His smoking use included cigarettes. He has been exposed to tobacco smoke. He has never used smokeless tobacco. He reports that he does not drink alcohol and does not use drugs.    Past Medical History  He has a past medical history of Dry eye syndrome of left lacrimal gland (10/30/2015), Dry eye syndrome of left lacrimal gland (10/30/2015), Hordeolum internum left lower eyelid (12/04/2015), Noninfective gastroenteritis and colitis, unspecified, Other bursitis of knee, unspecified knee, Personal history of other diseases of the respiratory system, and Personal history of other endocrine, nutritional and metabolic disease.    Surgical History  He has a past surgical history that includes Back surgery (11/15/2013); Hernia repair (11/15/2013); Vasectomy (11/15/2013); Total hip arthroplasty (11/15/2013); Lung lobectomy (01/13/2017); and Other surgical history (01/13/2017).    Allergies  Pollen extracts, Bactrim [sulfamethoxazole-trimethoprim], Risedronate, and Sulfa (sulfonamide antibiotics)    Objective    Last Recorded Vitals  Blood pressure 112/58, pulse 71, temperature 36.1 °C (97 °F), temperature source Temporal, resp. rate 17, height 1.6 m (5' 3\"), weight 71.9 kg (158 lb 8.2 oz), SpO2 99 %. Body mass index is 28.08 kg/m².    Physical Exam  Vitals and nursing note reviewed.   Constitutional:       General: He is not in acute distress.     Appearance: He is ill-appearing. He is not toxic-appearing.      Comments: Chronically ill-appearing.   HENT:      Head: Normocephalic and atraumatic.      Nose: Nose normal.      Mouth/Throat:      Mouth: Mucous membranes are moist.   Eyes:      Pupils: Pupils are equal, round, and reactive to light.   Cardiovascular:      Rate and Rhythm: Normal rate. Rhythm irregular.      Heart sounds: Murmur heard.   Abdominal:      General: There is no distension.      Palpations: Abdomen is soft.      " Tenderness: There is no abdominal tenderness. There is no guarding.   Musculoskeletal:      Right lower le+ Edema present.      Left lower le+ Edema present.   Skin:     General: Skin is warm and dry.   Neurological:      Mental Status: He is alert and oriented to person, place, and time.      Motor: Weakness present.   Psychiatric:         Mood and Affect: Mood normal.         Behavior: Behavior normal.         Thought Content: Thought content normal.         Judgment: Judgment normal.     Relevant Results  Results for orders placed or performed during the hospital encounter of 24 (from the past 24 hour(s))   CBC   Result Value Ref Range    WBC 9.2 4.4 - 11.3 x10*3/uL    nRBC 0.0 0.0 - 0.0 /100 WBCs    RBC 3.28 (L) 4.50 - 5.90 x10*6/uL    Hemoglobin 9.5 (L) 13.5 - 17.5 g/dL    Hematocrit 28.6 (L) 41.0 - 52.0 %    MCV 87 80 - 100 fL    MCH 29.0 26.0 - 34.0 pg    MCHC 33.2 32.0 - 36.0 g/dL    RDW 15.4 (H) 11.5 - 14.5 %    Platelets 195 150 - 450 x10*3/uL   Renal Function Panel   Result Value Ref Range    Glucose 104 (H) 74 - 99 mg/dL    Sodium 134 (L) 136 - 145 mmol/L    Potassium 3.6 3.5 - 5.3 mmol/L    Chloride 100 98 - 107 mmol/L    Bicarbonate 22 21 - 32 mmol/L    Anion Gap 16 10 - 20 mmol/L    Urea Nitrogen 43 (H) 6 - 23 mg/dL    Creatinine 1.99 (H) 0.50 - 1.30 mg/dL    eGFR 33 (L) >60 mL/min/1.73m*2    Calcium 7.7 (L) 8.6 - 10.6 mg/dL    Phosphorus 2.3 (L) 2.5 - 4.9 mg/dL    Albumin 2.7 (L) 3.4 - 5.0 g/dL   Magnesium   Result Value Ref Range    Magnesium 1.84 1.60 - 2.40 mg/dL      Electrocardiogram, 12-lead PRN ACS symptoms  Atrial fibrillation  Right bundle branch block  Septal infarct (cited on or before 22-DEC-2016)  Abnormal ECG  When compared with ECG of 19-MAR-2024 14:07,  Right bundle branch block has replaced Nonspecific intraventricular block  XR chest 1 view  Narrative: Interpreted By:  Nhung Cruz and Afshari Mirak Blaine   STUDY:  XR CHEST 1 VIEW;  3/20/2024 3:59 am       INDICATION:  Signs/Symptoms:CVC placement rij.      COMPARISON:  X-ray 03/19/2020      ACCESSION NUMBER(S):  [id number]      ORDERING CLINICIAN:  CHANA SAMUEL      FINDINGS:  AP radiograph of the chest was provided.      Right IJ approach central venous catheter tip projects over right  atrium.      CARDIOMEDIASTINAL SILHOUETTE:  Cardiomediastinal silhouette is stable in size and configuration,  enlarged.      LUNGS:  Again noted are prominent perihilar interstitial markings. There are  hazy opacities in bilateral lung bases with obscuration of the  costophrenic angles.      ABDOMEN:  No remarkable upper abdominal findings.      BONES:  No acute osseous changes.      Impression: 1. Cardiomegaly with pulmonary edema.  2. Bilateral pleural effusions with adjacent atelectasis/infiltrate.      I personally reviewed the images/study and I agree with the findings  as stated by resident physician Dr. Blaine Bowser . This study  was interpreted at [place], [place].      MACRO:  None      Signed by: Nhung Cruz 3/20/2024 8:32 AM  Dictation workstation:   [id number]     Encounter Date: 03/19/24   Electrocardiogram, 12-lead PRN ACS symptoms   Result Value    Ventricular Rate 78    Atrial Rate 78    QRS Duration 128    QT Interval 430    QTC Calculation(Bazett) 490    R Axis 133    T Axis -18    QRS Count 13    Q Onset 215    T Offset 430    QTC Fredericia 469    Narrative    Atrial fibrillation  Right bundle branch block  Septal infarct (cited on or before 22-DEC-2016)  Abnormal ECG  When compared with ECG of 19-MAR-2024 14:07,  Right bundle branch block has replaced Nonspecific intraventricular block     Scheduled medications  apixaban, 2.5 mg, oral, BID  azelastine, 2 spray, Each Nostril, BID  carbidopa-levodopa, 0.5 tablet, oral, Daily  carbidopa-levodopa, 2 tablet, oral, 4 times per day  ipratropium, 2 spray, Each Nostril, BID  levothyroxine, 88 mcg, oral,  Daily before breakfast  lidocaine, 1 patch, transdermal, Daily  magnesium sulfate, 2 g, intravenous, Once  pantoprazole, 40 mg, oral, Daily before breakfast  perflutren lipid microspheres, 0.5-10 mL of dilution, intravenous, Once in imaging  piperacillin-tazobactam, 2.25 g, intravenous, q6h  polyethylene glycol, 17 g, oral, BID  tamsulosin, 0.4 mg, oral, Daily    PRN medications  PRN medications: acetaminophen     Assessment/Plan    Brady Ortega is a 79 y.o. 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 Patient off pressors since 03/21/24, Blood culture 03/19/2024 negative, Urine culture 03/18/2024 with Pseudomona, plan to continue Zosyn IV until 03/23/2024 then transition PO for total 7 day course for complicated UTI. Lines removed and patient medically ready for the floor. Urology evaluated and despite recurrent infections as family and patient having difficulty with CIC will maintain sherwood catheter at discharge due to ongoing urinary retention. Palliative Care consulted for ongoing goals of care and discharge planning. Per discussions, family currently wishing patient to go home after discharge. Palliative Care helping with HHA and equipment at home.     Palliative Performance Scale (PPS): 40%    ----------------------------------------------------------------------------------------------------------------------------------------------------------------------------------------------------------------------------------------------------------------------------------------------------------------------------------------------------------------------  Advanced Care  Planning  Patient and family consented to a voluntary Advanced Care Planning meeting.   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     Disease Specific Information Provided/Prognosis Discussed: Patient's current clinical condition, including diagnosis, prognosis, and management plan were discussed.   Counseling provided on goals of care, discharge expectations, and outpatient Palliative Care services.   Counseling provided on guarded prognosis and what to expect with disease progression of Cardiac Amyloidosis.   Counseling provided on the irreversible and progressive nature of patient's diseases including Parkinson's Disease, and Heart Failure.     Understanding/Overall Impression: Patient and family expressing clear understanding of overall health status and severity of illness.     Goals/Hopes: Discussion ensued about patient's goals for their medical care going forward. Allowed patient time to talk about his current quality of life, disease course/progression, and symptom and treatment burden. Discussed care plan to continue with aggressive hospital care despite symptom and treatment burden versus choosing to transition to comfort based plan of care that focuses on symptom management and quality of life. Pt's and family goal to be discharged home with [facility] and Outpatient Palliative services when ready to leave the hospital.     Fears/Worries/Concerns: Pt worried about going back to nursing home and feels he is in better spirits and gets stronger faster being in home setting.     Minimal Acceptable Quality of Life/Maximal [place] Tolerable for the Possibility of More Time: Counseling provided on the concept of MAO/Maximal [place]. Patient expressing that they would never want to be in a health state where  they were dependent on other's for ADLs/toileting needs, bed bound, intubated or placed on a ventilator, have a permanent feeding tube if they could not eat, have a tracheostomy placed. Patient deems that this would not be an acceptable quality of life for the patient.     Resuscitation Assessment: Counseling provided on the benefit versus burden of CPR in the setting of patient's overall health status and pt is DNR/DNI per pt and family wishes.     Advanced Directives:  Counseling provided on the importance of not crisis planning as disease burden progresses but to establish treatment limitations now so in the future medical team will be clear on what patient feels is an acceptable quality of life for the patient and what treatment limitations' patient would like set into place based on that.       Surrogate Health Care Decision Maker: Elsa Ortega [phone]  HPOA: Yes, on file.   Living will: Yes, on file.     Code Status: Decision to keep code status DNR/DNI at this time.     Hospice Discussion/Eligibility: Counseling provided on the benefit of Hospice Services in the setting of patient's Cardiac Amyloidosis, Heart Failure, Parkinson's Disease, and Urosepsis to keep patient out of the hospital while supporting patient and family by providing counseling, aggressive symptom management, prioritizing comfort and quality of life, alleviation of suffering, and allowing patient to pass with comfort and dignity.  Patient/Family Impression: Pt and family would like home health care services at time of discharge from hospital.   All questions and concerns were addressed during encounter.     I spent 70 minutes in providing separately identifiable ACP services with the patient and/or surrogate decision maker in a voluntary conversation discussing the patient's wishes and goals as detailed in the above note.    ----------------------------------------------------------------------------------------------------------------------------------------------------------------------------------------------------------------------------------------------------------------------------------------------------------------------------------------------------------------------    #Complex Medical Decision Making  #Goals of Care  #Advanced Care Planning  - Code status: DNR/DNI  - Surrogate decision maker: Elsa Ortega [phone] patient's wife  - Goals are mix of survival and time and improved quality of life  - Advanced Directives on file   - Recommend outpatient Palliative Care referral at time of discharge    #Acute Pain  - Recommend continue Acetaminophen 650mg [id number] PRN pain    #Xerosis  - Recommend start Lac-Hydrin 12% cream apply topically BID to bilateral arms and legs for excessive dry skin. (Do not apply to any open areas).      #Weakness  - Recommend PT and OT as ordered    #Insomnia  - Recommend Melatonin 3mg at bedtime PRN insomnia    #Psychosocial Support  - Music Therapy consult  - Spiritual Care Support  - Art Therapy consult  - Palliative SW support    Plan of Care discussed with: Updated MD primary team and bedside RN on goals of care decision, medication adjustments, and code status.     Medical Decision Making was high level due to high complexity of problems, extensive data review, and high risk of management/treatment.     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 Palliative will continue to follow as needed.  Palliative medicine is available Monday-Friday, 8a-6p. Please contact team with any questions or concerns.  Team pager [phone]  Mary Youssef CNP (on EPIC secure chat)  Palliative Medicine Nurse Practitioner   [email or web address]      XI Mo-CNP

## 2024-03-22 NOTE — PROGRESS NOTES
Participated in a family meeting with pt, his wife Elsa and daughter Ana M to discuss goals of care and plans moving forward.  Pt has had multiple hospital stays/skilled nursing stays and would like to be able to be discharged home from the hospital this time.  Wife would like to bring him home, knowing that the frequent hospital stays and skilled stays are affecting his mood.  Pt feels he will recover better at home. Pt had been receiving home care skilled services from [facility] (RN PT OT HHA) and would like to resume services with them when ready for discharge.  We discussed ideas such as a first floor set up which would require more equipment at home such as hospital bed.  Family has some names of agencies that can provide additional HHA support if needed.  Most likely will make referral to Navigator to follow pt at home for extra layer of support.  Will follow.    Mary Jo Patiño, LAYLAW

## 2024-03-23 LAB
25(OH)D3 SERPL-MCNC: 61 NG/ML (ref 30–100)
ALBUMIN SERPL BCP-MCNC: 2.6 G/DL (ref 3.4–5)
ANION GAP SERPL CALC-SCNC: 14 MMOL/L (ref 10–20)
BACTERIA BLD CULT: NORMAL
BACTERIA BLD CULT: NORMAL
BASOPHILS # BLD AUTO: 0.03 X10*3/UL (ref 0–0.1)
BASOPHILS NFR BLD AUTO: 0.4 %
BUN SERPL-MCNC: 37 MG/DL (ref 6–23)
CALCIUM SERPL-MCNC: 7.8 MG/DL (ref 8.6–10.6)
CHLORIDE SERPL-SCNC: 102 MMOL/L (ref 98–107)
CO2 SERPL-SCNC: 24 MMOL/L (ref 21–32)
CREAT SERPL-MCNC: 1.91 MG/DL (ref 0.5–1.3)
EGFRCR SERPLBLD CKD-EPI 2021: 35 ML/MIN/1.73M*2
EOSINOPHIL # BLD AUTO: 0.5 X10*3/UL (ref 0–0.4)
EOSINOPHIL NFR BLD AUTO: 6.7 %
ERYTHROCYTE [DISTWIDTH] IN BLOOD BY AUTOMATED COUNT: 15.6 % (ref 11.5–14.5)
GLUCOSE SERPL-MCNC: 88 MG/DL (ref 74–99)
HCT VFR BLD AUTO: 29.3 % (ref 41–52)
HGB BLD-MCNC: 9.5 G/DL (ref 13.5–17.5)
IMM GRANULOCYTES # BLD AUTO: 0.05 X10*3/UL (ref 0–0.5)
IMM GRANULOCYTES NFR BLD AUTO: 0.7 % (ref 0–0.9)
LYMPHOCYTES # BLD AUTO: 0.83 X10*3/UL (ref 0.8–3)
LYMPHOCYTES NFR BLD AUTO: 11.2 %
MAGNESIUM SERPL-MCNC: 2.05 MG/DL (ref 1.6–2.4)
MCH RBC QN AUTO: 28.6 PG (ref 26–34)
MCHC RBC AUTO-ENTMCNC: 32.4 G/DL (ref 32–36)
MCV RBC AUTO: 88 FL (ref 80–100)
MONOCYTES # BLD AUTO: 0.55 X10*3/UL (ref 0.05–0.8)
MONOCYTES NFR BLD AUTO: 7.4 %
NEUTROPHILS # BLD AUTO: 5.46 X10*3/UL (ref 1.6–5.5)
NEUTROPHILS NFR BLD AUTO: 73.6 %
NRBC BLD-RTO: 0 /100 WBCS (ref 0–0)
PHOSPHATE SERPL-MCNC: 2.1 MG/DL (ref 2.5–4.9)
PLATELET # BLD AUTO: 207 X10*3/UL (ref 150–450)
POTASSIUM SERPL-SCNC: 3.6 MMOL/L (ref 3.5–5.3)
RBC # BLD AUTO: 3.32 X10*6/UL (ref 4.5–5.9)
SODIUM SERPL-SCNC: 136 MMOL/L (ref 136–145)
WBC # BLD AUTO: 7.4 X10*3/UL (ref 4.4–11.3)

## 2024-03-23 PROCEDURE — 2500000005 HC RX 250 GENERAL PHARMACY W/O HCPCS

## 2024-03-23 PROCEDURE — 2500000004 HC RX 250 GENERAL PHARMACY W/ HCPCS (ALT 636 FOR OP/ED)

## 2024-03-23 PROCEDURE — 80069 RENAL FUNCTION PANEL: CPT

## 2024-03-23 PROCEDURE — 36415 COLL VENOUS BLD VENIPUNCTURE: CPT

## 2024-03-23 PROCEDURE — 2500000001 HC RX 250 WO HCPCS SELF ADMINISTERED DRUGS (ALT 637 FOR MEDICARE OP)

## 2024-03-23 PROCEDURE — 99233 SBSQ HOSP IP/OBS HIGH 50: CPT | Performed by: INTERNAL MEDICINE

## 2024-03-23 PROCEDURE — 2500000002 HC RX 250 W HCPCS SELF ADMINISTERED DRUGS (ALT 637 FOR MEDICARE OP, ALT 636 FOR OP/ED)

## 2024-03-23 PROCEDURE — 85025 COMPLETE CBC W/AUTO DIFF WBC: CPT

## 2024-03-23 PROCEDURE — 1200000002 HC GENERAL ROOM WITH TELEMETRY DAILY

## 2024-03-23 PROCEDURE — 83735 ASSAY OF MAGNESIUM: CPT

## 2024-03-23 PROCEDURE — 51702 INSERT TEMP BLADDER CATH: CPT

## 2024-03-23 RX ORDER — TORSEMIDE 20 MG/1
20 TABLET ORAL ONCE
Status: COMPLETED | OUTPATIENT
Start: 2024-03-23 | End: 2024-03-23

## 2024-03-23 RX ORDER — CIPROFLOXACIN 500 MG/1
500 TABLET ORAL EVERY 24 HOURS
Status: DISCONTINUED | OUTPATIENT
Start: 2024-03-23 | End: 2024-03-25 | Stop reason: HOSPADM

## 2024-03-23 RX ADMIN — CARBIDOPA AND LEVODOPA 2 TABLET: 25; 100 TABLET ORAL at 15:03

## 2024-03-23 RX ADMIN — APIXABAN 2.5 MG: 2.5 TABLET, FILM COATED ORAL at 20:00

## 2024-03-23 RX ADMIN — POTASSIUM PHOSPHATE, MONOBASIC POTASSIUM PHOSPHATE, DIBASIC 15 MMOL: 224; 236 INJECTION, SOLUTION, CONCENTRATE INTRAVENOUS at 10:10

## 2024-03-23 RX ADMIN — CIPROFLOXACIN HYDROCHLORIDE 500 MG: 500 TABLET, FILM COATED ORAL at 15:04

## 2024-03-23 RX ADMIN — TAMSULOSIN HYDROCHLORIDE 0.4 MG: 0.4 CAPSULE ORAL at 09:12

## 2024-03-23 RX ADMIN — APIXABAN 2.5 MG: 2.5 TABLET, FILM COATED ORAL at 09:12

## 2024-03-23 RX ADMIN — PIPERACILLIN SODIUM AND TAZOBACTAM SODIUM 2.25 G: 2; .25 INJECTION, SOLUTION INTRAVENOUS at 06:23

## 2024-03-23 RX ADMIN — CARBIDOPA AND LEVODOPA 2 TABLET: 25; 100 TABLET ORAL at 18:12

## 2024-03-23 RX ADMIN — TORSEMIDE 20 MG: 20 TABLET ORAL at 12:53

## 2024-03-23 RX ADMIN — LEVOTHYROXINE SODIUM 88 MCG: 0.09 TABLET ORAL at 09:13

## 2024-03-23 RX ADMIN — PIPERACILLIN SODIUM AND TAZOBACTAM SODIUM 2.25 G: 2; .25 INJECTION, SOLUTION INTRAVENOUS at 11:28

## 2024-03-23 RX ADMIN — CARBIDOPA AND LEVODOPA 0.5 TABLET: 25; 100 TABLET ORAL at 18:12

## 2024-03-23 RX ADMIN — IPRATROPIUM BROMIDE 2 SPRAY: 21 SPRAY, METERED NASAL at 20:01

## 2024-03-23 RX ADMIN — PANTOPRAZOLE SODIUM 40 MG: 40 TABLET, DELAYED RELEASE ORAL at 06:49

## 2024-03-23 RX ADMIN — CARBIDOPA AND LEVODOPA 2 TABLET: 25; 100 TABLET ORAL at 11:27

## 2024-03-23 RX ADMIN — CARBIDOPA AND LEVODOPA 2 TABLET: 25; 100 TABLET ORAL at 06:48

## 2024-03-23 ASSESSMENT — COGNITIVE AND FUNCTIONAL STATUS - GENERAL
HELP NEEDED FOR BATHING: A LOT
EATING MEALS: A LITTLE
TOILETING: TOTAL
STANDING UP FROM CHAIR USING ARMS: A LOT
PERSONAL GROOMING: A LITTLE
STANDING UP FROM CHAIR USING ARMS: A LOT
TURNING FROM BACK TO SIDE WHILE IN FLAT BAD: A LOT
DRESSING REGULAR LOWER BODY CLOTHING: A LOT
TOILETING: TOTAL
MOBILITY SCORE: 10
WALKING IN HOSPITAL ROOM: TOTAL
MOVING FROM LYING ON BACK TO SITTING ON SIDE OF FLAT BED WITH BEDRAILS: A LOT
WALKING IN HOSPITAL ROOM: TOTAL
MOVING TO AND FROM BED TO CHAIR: A LOT
CLIMB 3 TO 5 STEPS WITH RAILING: TOTAL
CLIMB 3 TO 5 STEPS WITH RAILING: TOTAL
DRESSING REGULAR UPPER BODY CLOTHING: A LOT
DRESSING REGULAR LOWER BODY CLOTHING: A LOT
EATING MEALS: A LITTLE
DRESSING REGULAR UPPER BODY CLOTHING: A LOT
MOVING FROM LYING ON BACK TO SITTING ON SIDE OF FLAT BED WITH BEDRAILS: A LOT
MOBILITY SCORE: 10
PERSONAL GROOMING: A LITTLE
MOVING TO AND FROM BED TO CHAIR: A LOT
DAILY ACTIVITIY SCORE: 13
TURNING FROM BACK TO SIDE WHILE IN FLAT BAD: A LOT
HELP NEEDED FOR BATHING: A LOT
DAILY ACTIVITIY SCORE: 13

## 2024-03-23 ASSESSMENT — PAIN - FUNCTIONAL ASSESSMENT
PAIN_FUNCTIONAL_ASSESSMENT: 0-10
PAIN_FUNCTIONAL_ASSESSMENT: 0-10

## 2024-03-23 ASSESSMENT — PAIN SCALES - GENERAL
PAINLEVEL_OUTOF10: 0 - NO PAIN
PAINLEVEL_OUTOF10: 0 - NO PAIN

## 2024-03-23 NOTE — CARE PLAN
Pt transferred to LT5 from CICU this shift. Pt remained HDS and free of injury this shift. Pt continues on IV Zosyn. Pt's labs drawn this morning and sent off. Pt has no complaints of CP, SOB, N/V, dizziness/lightheadedness. Pt had an uneventful night.    Problem: Skin  Goal: Decreased wound size/increased tissue granulation at next dressing change  Outcome: Progressing     Problem: Skin  Goal: Participates in plan/prevention/treatment measures  Outcome: Progressing     Problem: Skin  Goal: Prevent/manage excess moisture  Outcome: Progressing     Problem: Skin  Goal: Prevent/minimize sheer/friction injuries  Outcome: Progressing     Problem: Skin  Goal: Promote/optimize nutrition  Outcome: Progressing     Problem: Skin  Goal: Promote skin healing  Outcome: Progressing

## 2024-03-23 NOTE — PROGRESS NOTES
"Brady Ortega is a 79 y.o. male on day 4 of admission presenting with Shock (CMS/HCC).    Subjective   The patient states that he is feeling okay. He was getting Maalox, and he states that his stomach is rumbling. He states he is full of gas, but endorses passing gas and having bowel movements. The last time he went was last night, though unclear on time. Denies chest pain or shortness of breath. He states that he is trying to get acclimated in the AM. He also wants to walk around and work with physical therapy.     Objective   Visit Vitals  /72 (BP Location: Left arm, Patient Position: Lying)   Pulse 73   Temp 36.2 °C (97.2 °F) (Temporal)   Resp 18   Ht 1.6 m (5' 3\")   Wt 71.9 kg (158 lb 8.2 oz)   SpO2 97%   BMI 28.08 kg/m²   Smoking Status Former   BSA 1.79 m²      Physical Exam  Vitals reviewed.   Constitutional:       Appearance: Normal appearance.   HENT:      Head: Normocephalic and atraumatic.   Eyes:      Extraocular Movements: Extraocular movements intact.      Pupils: Pupils are equal, round, and reactive to light.   Neck:      Comments: Elevated JVD  Cardiovascular:      Rate and Rhythm: Normal rate. Rhythm irregular.   Pulmonary:      Effort: Pulmonary effort is normal.      Breath sounds: Normal breath sounds.   Abdominal:      General: Abdomen is flat.      Palpations: Abdomen is soft.   Musculoskeletal:      Right lower leg: Edema present.      Left lower leg: Edema present.   Neurological:      General: No focal deficit present.      Mental Status: He is alert.   Psychiatric:         Mood and Affect: Mood normal.       Intake/Output last 3 Shifts:  I/O last 3 completed shifts:  In: 200 (2.8 mL/kg) [I.V.:50 (0.7 mL/kg); IV Piggyback:150]  Out: 1240 (17.2 mL/kg) [Urine:1240 (0.5 mL/kg/hr)]  Weight: 71.9 kg     Relevant Results  Scheduled medications  apixaban, 2.5 mg, oral, BID  azelastine, 2 spray, Each Nostril, BID  carbidopa-levodopa, 0.5 tablet, oral, Daily  carbidopa-levodopa, 2 tablet, " oral, 4 times per day  ciprofloxacin, 500 mg, oral, q24h  ipratropium, 2 spray, Each Nostril, BID  levothyroxine, 88 mcg, oral, Daily before breakfast  lidocaine, 1 patch, transdermal, Daily  pantoprazole, 40 mg, oral, Daily before breakfast  perflutren lipid microspheres, 0.5-10 mL of dilution, intravenous, Once in imaging  piperacillin-tazobactam, 2.25 g, intravenous, q6h  polyethylene glycol, 17 g, oral, BID  potassium phosphate, 15 mmol, intravenous, Once  tamsulosin, 0.4 mg, oral, Daily    Continuous medications     PRN medications  PRN medications: acetaminophen    Lab Results   Component Value Date    WBC 7.4 2024    HGB 9.5 (L) 2024    HCT 29.3 (L) 2024    MCV 88 2024     2024      Lab Results   Component Value Date    GLUCOSE 88 2024    CALCIUM 7.8 (L) 2024     2024    K 3.6 2024    CO2 24 2024     2024    BUN 37 (H) 2024    CREATININE 1.91 (H) 2024      Phos: 2.1  Ma.05    Lab Results   Component Value Date    ALT 5 (L) 2024    AST 24 2024    ALKPHOS 84 2024    BILITOT 0.8 2024       Latest Reference Range & Units 24 06:12   INR 0.9 - 1.1  2.3 (H)   Protime 9.8 - 12.8 seconds 26.5 (H)   aPTT 27 - 38 seconds 37   (H): Data is abnormally high    MICRO:  Urine Culture (3/18/24): >100,000 Pseudomonas aeruginosa  Susceptibility     Pseudomonas aeruginosa     MICROSCAN    $ Aztreonam Susceptible    $$ Cefepime Susceptible    $$ Ceftazidime Susceptible    $ Ciprofloxacin Susceptible    $$ Piperacillin/Tazobactam Susceptible    $ Tobramycin Susceptible               Assessment/Plan   The patient is an 78 year old male with a PMHx of Parkinsonism, CKD stage 3 (baseline 1.3-1.6), Afib on Eliquis, HTN, CAD, Hx of lung cancer s/p LLL lobectomy, HFpEF (EF 50-55% 2024) 2/2 suspected cardiac amyloidosis, and recent admission to [facility] (-) for sepsis 2/2 RLL cellulitis who presented  to the ED with hypotension and lethargy treated for septic shock 2/2 a UTI due to pan-sensitive Pseudomonas. He is off pressors since 3/21 and Zosyn is now switched to ciprofloxacin for a total 7 day course.     Updates 3/24:  - Ongoing goals of care and home-going plans with palliative team. Family wishes patient to go home after discharge. Palliative helping with HHA and equipment at home.  - Urology consulted for urinary retention. 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   #HFpEF (EF 50-55% 3/2024)  #HTN  #CAD  - Trop 237 and flat,   - on home atorva, metolazone 2.5mg, torsemide 20mg alternating with 40mg  - TTE (3/19): LVEF 50-55%; apical sparing of the apex, severe LVH; severe LA dilation; moderate RA dilation, moderate TR; + bubble study 2/2 PFO  - SPEP, UPEP, and kappa jessica checked outpt and ruled out AL amyloid @ [facility]  - Ongoing discussions with wife and patient in regards to next steps, given low likelihood of benefit in this patient  Plan:   - Atorvastatin currently on hold  - Outpatient follow-up with the patient's cardiology for further work-up if desired by the patient and family  - Torsemide 20 mg today. Diuresis PRN.   - Palliative care following and helping with HHA and equipment at home.     #Septic Shock 2/2 UTI (resolved)  - UA concerning for UTI + UTI symptoms + febrile + leukocytosis  - Prior bacteremia w/ Streptococcus dysgalactiae Oct/2023  - No positive urine cultures from prior  - Urine culture: pseudomonas pan sensitive  - CT chest mostly un-revealing   Plan:   - Transition from pip/tazo (3/19-3/22) to ciprofloxacin for 4  days.   - Maintain sherwood cath upon discharge for chronic urinary retention    #Parkinsonism  - c/w home carbidopa/levodopa 25/100 QID    #Afib  - HR well controlled  - Currently in Afib, no RVR, not on rhythm or rate control  Plan:   - Continue Eliquis 2.5 mg BID     #Hx of Lung cancer s/p LLL lobectomy in 2018  - Residual PTX seen on CT and CXR  - Stable and satting well on RA     #BEKA on CKD stage 3  #BPH  #Chronic urinary retention  - Hx of recurrent infections, attempted CIC in past but difficult for family and patient.  - Baseline Cr 1.3-1.6  - Cr elevated at 2.34 on arrival, trending downwards  - FeNa consistent with pre-renal, likely from poor renal perfusion from systemic vasodilation  Plan:  - Started on home tamsulosin 0.4 mg PO  - Will consider restarting tadalafil in the next few days  - Urology consulted. Per family discussion w urology plan to maintain sherwood catheter at discharge for chronic urinary retention  - Needs follow up with outpatient urology     #Hypothyroidism  - TSH 7.36, fT4 1.11 wnl  Plan:  - Continue home levothyroxine 88mcg    #Hyperparathyroidism  - Was recently told to stop cincalcet per patients wife  Plan:  - Vitamin D levels    F: PRN  E: PRN  N: Cardiac diet  A: PIV  DVT ppx: Home Eliquis  GI ppx: PPI  Abx: Ciprofloxacin  O2: RA    Code Status: DNAR/DNI confirmed on arrival   NOK: Elsa Ortega [phone]    Kelsi Boyer MD

## 2024-03-23 NOTE — PROGRESS NOTES
Friday handoff for weekend from Care Transitions SW & Palliative SW.  Pt. Now LT [id number] from CICU.  IN REVIEW TODAY:  DC PLAN: Home with family & C, [facility], [facility], Navigator.      If dc over weekend, ( ) inform [facility] [phone] & ( ) Upload HCO in Careport / ( ) Notify [facility], Navigator via existing referral in Careport.       Contact: Wife: Elsa Ortega [phone]    Shara Aguirre (LSW, MSW)

## 2024-03-24 LAB
ALBUMIN SERPL BCP-MCNC: 2.8 G/DL (ref 3.4–5)
ANION GAP SERPL CALC-SCNC: 13 MMOL/L (ref 10–20)
BUN SERPL-MCNC: 36 MG/DL (ref 6–23)
CALCIUM SERPL-MCNC: 8 MG/DL (ref 8.6–10.6)
CHLORIDE SERPL-SCNC: 103 MMOL/L (ref 98–107)
CO2 SERPL-SCNC: 23 MMOL/L (ref 21–32)
CREAT SERPL-MCNC: 1.67 MG/DL (ref 0.5–1.3)
EGFRCR SERPLBLD CKD-EPI 2021: 41 ML/MIN/1.73M*2
GLUCOSE SERPL-MCNC: 105 MG/DL (ref 74–99)
MAGNESIUM SERPL-MCNC: 1.94 MG/DL (ref 1.6–2.4)
PHOSPHATE SERPL-MCNC: 2.5 MG/DL (ref 2.5–4.9)
POTASSIUM SERPL-SCNC: 3.7 MMOL/L (ref 3.5–5.3)
SODIUM SERPL-SCNC: 135 MMOL/L (ref 136–145)

## 2024-03-24 PROCEDURE — 2500000004 HC RX 250 GENERAL PHARMACY W/ HCPCS (ALT 636 FOR OP/ED)

## 2024-03-24 PROCEDURE — 36415 COLL VENOUS BLD VENIPUNCTURE: CPT

## 2024-03-24 PROCEDURE — 2500000002 HC RX 250 W HCPCS SELF ADMINISTERED DRUGS (ALT 637 FOR MEDICARE OP, ALT 636 FOR OP/ED)

## 2024-03-24 PROCEDURE — 1200000002 HC GENERAL ROOM WITH TELEMETRY DAILY

## 2024-03-24 PROCEDURE — 84100 ASSAY OF PHOSPHORUS: CPT

## 2024-03-24 PROCEDURE — 2500000001 HC RX 250 WO HCPCS SELF ADMINISTERED DRUGS (ALT 637 FOR MEDICARE OP)

## 2024-03-24 PROCEDURE — 83735 ASSAY OF MAGNESIUM: CPT

## 2024-03-24 PROCEDURE — 99232 SBSQ HOSP IP/OBS MODERATE 35: CPT | Performed by: INTERNAL MEDICINE

## 2024-03-24 RX ORDER — POTASSIUM CHLORIDE 20 MEQ/1
40 TABLET, EXTENDED RELEASE ORAL ONCE
Status: COMPLETED | OUTPATIENT
Start: 2024-03-24 | End: 2024-03-24

## 2024-03-24 RX ORDER — MAGNESIUM SULFATE HEPTAHYDRATE 40 MG/ML
4 INJECTION, SOLUTION INTRAVENOUS ONCE
Status: COMPLETED | OUTPATIENT
Start: 2024-03-24 | End: 2024-03-24

## 2024-03-24 RX ADMIN — PANTOPRAZOLE SODIUM 40 MG: 40 TABLET, DELAYED RELEASE ORAL at 06:44

## 2024-03-24 RX ADMIN — TAMSULOSIN HYDROCHLORIDE 0.4 MG: 0.4 CAPSULE ORAL at 08:37

## 2024-03-24 RX ADMIN — CARBIDOPA AND LEVODOPA 2 TABLET: 25; 100 TABLET ORAL at 06:44

## 2024-03-24 RX ADMIN — IPRATROPIUM BROMIDE 2 SPRAY: 21 SPRAY, METERED NASAL at 21:18

## 2024-03-24 RX ADMIN — APIXABAN 2.5 MG: 2.5 TABLET, FILM COATED ORAL at 21:19

## 2024-03-24 RX ADMIN — CARBIDOPA AND LEVODOPA 2 TABLET: 25; 100 TABLET ORAL at 12:43

## 2024-03-24 RX ADMIN — CARBIDOPA AND LEVODOPA 2 TABLET: 25; 100 TABLET ORAL at 15:41

## 2024-03-24 RX ADMIN — MAGNESIUM SULFATE 4 G: 4 INJECTION INTRAVENOUS at 07:01

## 2024-03-24 RX ADMIN — CARBIDOPA AND LEVODOPA 2 TABLET: 25; 100 TABLET ORAL at 18:26

## 2024-03-24 RX ADMIN — APIXABAN 2.5 MG: 2.5 TABLET, FILM COATED ORAL at 08:37

## 2024-03-24 RX ADMIN — CARBIDOPA AND LEVODOPA 0.5 TABLET: 25; 100 TABLET ORAL at 18:27

## 2024-03-24 RX ADMIN — POTASSIUM CHLORIDE 40 MEQ: 1500 TABLET, EXTENDED RELEASE ORAL at 07:02

## 2024-03-24 RX ADMIN — CIPROFLOXACIN HYDROCHLORIDE 500 MG: 500 TABLET, FILM COATED ORAL at 12:44

## 2024-03-24 RX ADMIN — LEVOTHYROXINE SODIUM 88 MCG: 0.09 TABLET ORAL at 07:01

## 2024-03-24 ASSESSMENT — COGNITIVE AND FUNCTIONAL STATUS - GENERAL
MOBILITY SCORE: 12
MOVING FROM LYING ON BACK TO SITTING ON SIDE OF FLAT BED WITH BEDRAILS: A LOT
PERSONAL GROOMING: A LITTLE
MOBILITY SCORE: 12
DAILY ACTIVITIY SCORE: 13
STANDING UP FROM CHAIR USING ARMS: A LOT
TOILETING: TOTAL
TURNING FROM BACK TO SIDE WHILE IN FLAT BAD: A LOT
TURNING FROM BACK TO SIDE WHILE IN FLAT BAD: A LOT
EATING MEALS: A LITTLE
HELP NEEDED FOR BATHING: A LOT
DRESSING REGULAR LOWER BODY CLOTHING: A LOT
MOVING TO AND FROM BED TO CHAIR: A LOT
WALKING IN HOSPITAL ROOM: A LOT
DRESSING REGULAR UPPER BODY CLOTHING: A LOT
MOVING FROM LYING ON BACK TO SITTING ON SIDE OF FLAT BED WITH BEDRAILS: A LOT
PERSONAL GROOMING: A LITTLE
MOVING TO AND FROM BED TO CHAIR: A LOT
WALKING IN HOSPITAL ROOM: A LOT
HELP NEEDED FOR BATHING: A LOT
STANDING UP FROM CHAIR USING ARMS: A LOT
CLIMB 3 TO 5 STEPS WITH RAILING: A LOT
DAILY ACTIVITIY SCORE: 13
CLIMB 3 TO 5 STEPS WITH RAILING: A LOT
DRESSING REGULAR LOWER BODY CLOTHING: A LOT
TOILETING: TOTAL
EATING MEALS: A LITTLE
DRESSING REGULAR UPPER BODY CLOTHING: A LOT

## 2024-03-24 ASSESSMENT — PAIN SCALES - GENERAL
PAINLEVEL_OUTOF10: 0 - NO PAIN
PAINLEVEL_OUTOF10: 0 - NO PAIN

## 2024-03-24 ASSESSMENT — PAIN - FUNCTIONAL ASSESSMENT: PAIN_FUNCTIONAL_ASSESSMENT: 0-10

## 2024-03-24 NOTE — PROGRESS NOTES
Transitional Care Coordinator Progress Note:   ADOD 3/25 vs 3/26.  Pt will discharge home with RN, PT, OT and HHA, message sent to [facility] informing them of the ADOD and needed services via Careport. Requested a next day SOC.    Lesli Ramirez MSN, RN-BC  Transitional Care Coordinator (TCC)  [phone]

## 2024-03-24 NOTE — CARE PLAN
Pt remained HDS and free of injury this shift. Pt had an uneventful night. Pt's labs drawn and sent this am.    Problem: Skin  Goal: Decreased wound size/increased tissue granulation at next dressing change  Outcome: Progressing  Goal: Participates in plan/prevention/treatment measures  Outcome: Progressing  Goal: Prevent/manage excess moisture  Outcome: Progressing  Goal: Prevent/minimize sheer/friction injuries  Outcome: Progressing  Goal: Promote/optimize nutrition  Outcome: Progressing  Goal: Promote skin healing  Outcome: Progressing

## 2024-03-24 NOTE — PROGRESS NOTES
"Brady Ortega is a 79 y.o. male on day 5 of admission presenting with Shock (CMS/HCC).    Subjective   NAEON. He received a dose of torsemide yesterday. The patient is doing well, and he was feeling a little sleepy.     Objective   Visit Vitals  /67 (BP Location: Left arm, Patient Position: Sitting)   Pulse 70   Temp 36.4 °C (97.5 °F) (Temporal)   Resp 18   Ht 1.6 m (5' 3\")   Wt 69.2 kg (152 lb 8.9 oz)   SpO2 98%   BMI 27.02 kg/m²   Smoking Status Former   BSA 1.75 m²      Physical Exam  Vitals reviewed.   Constitutional:       Appearance: Normal appearance.   HENT:      Head: Normocephalic and atraumatic.   Eyes:      Extraocular Movements: Extraocular movements intact.      Pupils: Pupils are equal, round, and reactive to light.   Neck:      Comments: Elevated JVD  Cardiovascular:      Rate and Rhythm: Normal rate. Rhythm irregular.   Pulmonary:      Effort: Pulmonary effort is normal.      Breath sounds: Normal breath sounds.   Abdominal:      General: There is no distension.   Musculoskeletal:      Right lower leg: Edema present.      Left lower leg: Edema present.   Neurological:      General: No focal deficit present.      Mental Status: He is alert.   Psychiatric:         Mood and Affect: Mood normal.     Intake/Output last 3 Shifts:  I/O last 3 completed shifts:  In: 990 (14.3 mL/kg) [P.O.:840; I.V.:50 (0.7 mL/kg); IV Piggyback:100]  Out: 1625 (23.5 mL/kg) [Urine:1625 (0.7 mL/kg/hr)]  Weight: 69.2 kg     Relevant Results  Scheduled medications  apixaban, 2.5 mg, oral, BID  azelastine, 2 spray, Each Nostril, BID  carbidopa-levodopa, 0.5 tablet, oral, Daily  carbidopa-levodopa, 2 tablet, oral, 4 times per day  ciprofloxacin, 500 mg, oral, q24h  ipratropium, 2 spray, Each Nostril, BID  levothyroxine, 88 mcg, oral, Daily before breakfast  lidocaine, 1 patch, transdermal, Daily  pantoprazole, 40 mg, oral, Daily before breakfast  perflutren lipid microspheres, 0.5-10 mL of dilution, intravenous, Once in " imaging  polyethylene glycol, 17 g, oral, BID  tamsulosin, 0.4 mg, oral, Daily    Continuous medications     PRN medications  PRN medications: acetaminophen    Labs:   Lab Results   Component Value Date    GLUCOSE 105 (H) 2024    CALCIUM 8.0 (L) 2024     (L) 2024    K 3.7 2024    CO2 23 2024     2024    BUN 36 (H) 2024    CREATININE 1.67 (H) 2024      Phos: 2.5  Ma.94    Lab Results   Component Value Date    ALT 5 (L) 2024    AST 24 2024    ALKPHOS 84 2024    BILITOT 0.8 2024      MICRO:  Urine Culture (3/18/24): >100,000 Pseudomonas aeruginosa  Susceptibility     Pseudomonas aeruginosa     MICROSCAN    $ Aztreonam Susceptible    $$ Cefepime Susceptible    $$ Ceftazidime Susceptible    $ Ciprofloxacin Susceptible    $$ Piperacillin/Tazobactam Susceptible    $ Tobramycin Susceptible               Assessment/Plan   The patient is an 78 year old male with a PMHx of Parkinsonism, CKD stage 3 (baseline 1.3-1.6), Afib on Eliquis, HTN, CAD, Hx of lung cancer s/p LLL lobectomy, HFpEF (EF 50-55% 2024) 2/2 suspected cardiac amyloidosis, and recent admission to [facility]) for sepsis 2/2 RLL cellulitis who presented to the ED with hypotension and lethargy treated for septic shock 2/2 a UTI due to pan-sensitive Pseudomonas. He is off pressors since 3/21 and Zosyn is now switched to ciprofloxacin.     Updates 3/24:  - Ongoing goals of care and home-going plans with palliative team. Family wishes patient to go home after discharge. Palliative helping with HHA and equipment at home.  - Urology consulted for urinary retention. Per discussion w/ urology and family plan to maintain sherwood upon discharge; needs follow up with urology as outpatient for urinary retention   - Continue with ciprofloxacin for UTI  - Outpatient follow-up with cardiology for suspected cardiac amyloidosis     #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   #HFpEF (EF  50-55% 3/2024)  #HTN  #CAD  - Trop 237 and flat,   - on home atorva, metolazone 2.5mg, torsemide 20mg alternating with 40mg  - TTE (3/19): LVEF 50-55%; apical sparing of the apex, severe LVH; severe LA dilation; moderate RA dilation, moderate TR; + bubble study 2/2 PFO  - SPEP, UPEP, and kappa jessica checked outpt and ruled out AL amyloid @ [facility]  - Ongoing discussions with wife and patient in regards to next steps, given low likelihood of benefit in this patient  Plan:   - Atorvastatin currently on hold  - Outpatient follow-up with the patient's cardiology for further work-up if desired by the patient and family  - Diuresis PRN  - Palliative care following and helping with HHA and equipment at home.     #Septic Shock 2/2 UTI (resolved)  - UA concerning for UTI + UTI symptoms + febrile + leukocytosis  - Prior bacteremia w/ Streptococcus dysgalactiae Oct/2023  - No positive urine cultures from prior  - Urine culture: pseudomonas pan sensitive  - CT chest mostly un-revealing   Plan:   - Transition from pip/tazo (3/19-3/22) to ciprofloxacin (3/23-3/26).   - Maintain sherwood cath upon discharge for chronic urinary retention    #Parkinsonism  - c/w home carbidopa/levodopa 25/100 QID    #Afib  - HR well controlled  - Currently in Afib, no RVR, not on rhythm or rate control  Plan:   - Continue Eliquis 2.5 mg BID     #Hx of Lung cancer s/p LLL lobectomy in 2018  - Residual PTX seen on CT and CXR  - Stable and satting well on RA     #BEKA on CKD stage 3  #BPH  #Chronic urinary retention  - Hx of recurrent infections, attempted CIC in past but difficult for family and patient.  - Baseline Cr 1.3-1.6  - Cr elevated at 2.34 on arrival, trending downwards  - FeNa consistent with pre-renal, likely from poor renal perfusion from systemic vasodilation  Plan:  - Started on home tamsulosin 0.4 mg PO  - Will consider restarting tadalafil in the next few days  - Urology consulted. Per family discussion w urology plan to maintain sherwood  catheter at discharge for chronic urinary retention  - Needs follow up with outpatient urology   - Family will need supplies and teaching on the Denny catheter prior to discharge    #Hypothyroidism  - TSH 7.36, fT4 1.11 wnl  Plan:  - Continue home levothyroxine 88mcg    #Hyperparathyroidism  - Was recently told to stop cincalcet per patients wife  - Vitamin D level: 61    F: PRN  E: PRN  N: Cardiac diet  A: PIV  DVT ppx: Home Eliquis  GI ppx: PPI  Abx: Ciprofloxacin  O2: RA    Code Status: DNAR/DNI confirmed on arrival   NOK: Elsa Ortega [phone]    Kelsi Boyer MD

## 2024-03-25 ENCOUNTER — HOME HEALTH ADMISSION (OUTPATIENT)
Dept: HOME HEALTH SERVICES | Facility: HOME HEALTH | Age: 79
End: 2024-03-25
Payer: COMMERCIAL

## 2024-03-25 ENCOUNTER — DOCUMENTATION (OUTPATIENT)
Dept: HOME HEALTH SERVICES | Facility: HOME HEALTH | Age: 79
End: 2024-03-25

## 2024-03-25 ENCOUNTER — PHARMACY VISIT (OUTPATIENT)
Dept: PHARMACY | Facility: CLINIC | Age: 79
End: 2024-03-25
Payer: COMMERCIAL

## 2024-03-25 ENCOUNTER — DOCUMENTATION (OUTPATIENT)
Dept: HOME HEALTH SERVICES | Facility: HOME HEALTH | Age: 79
End: 2024-03-25
Payer: COMMERCIAL

## 2024-03-25 VITALS
WEIGHT: 156.53 LBS | BODY MASS INDEX: 27.73 KG/M2 | TEMPERATURE: 98.9 F | RESPIRATION RATE: 20 BRPM | HEART RATE: 86 BPM | DIASTOLIC BLOOD PRESSURE: 62 MMHG | HEIGHT: 63 IN | SYSTOLIC BLOOD PRESSURE: 124 MMHG | OXYGEN SATURATION: 98 %

## 2024-03-25 PROBLEM — E85.82 WILD-TYPE TRANSTHYRETIN-RELATED (ATTR) AMYLOIDOSIS (MULTI): Status: ACTIVE | Noted: 2024-03-25

## 2024-03-25 PROBLEM — A41.9 SEPSIS WITH ACUTE ORGAN DYSFUNCTION AND SEPTIC SHOCK (MULTI): Status: ACTIVE | Noted: 2024-03-25

## 2024-03-25 PROBLEM — E83.39 HYPOPHOSPHATEMIA: Status: ACTIVE | Noted: 2024-03-25

## 2024-03-25 PROBLEM — R65.21 SEPSIS WITH ACUTE ORGAN DYSFUNCTION AND SEPTIC SHOCK (MULTI): Status: ACTIVE | Noted: 2024-03-25

## 2024-03-25 PROBLEM — Z71.89 GOALS OF CARE, COUNSELING/DISCUSSION: Status: ACTIVE | Noted: 2024-03-25

## 2024-03-25 LAB
ALBUMIN SERPL BCP-MCNC: 2.9 G/DL (ref 3.4–5)
ANION GAP SERPL CALC-SCNC: 13 MMOL/L (ref 10–20)
BASOPHILS # BLD AUTO: 0.03 X10*3/UL (ref 0–0.1)
BASOPHILS NFR BLD AUTO: 0.4 %
BUN SERPL-MCNC: 30 MG/DL (ref 6–23)
CALCIUM SERPL-MCNC: 8.6 MG/DL (ref 8.6–10.6)
CHLORIDE SERPL-SCNC: 104 MMOL/L (ref 98–107)
CO2 SERPL-SCNC: 22 MMOL/L (ref 21–32)
CREAT SERPL-MCNC: 1.45 MG/DL (ref 0.5–1.3)
EGFRCR SERPLBLD CKD-EPI 2021: 48 ML/MIN/1.73M*2
EOSINOPHIL # BLD AUTO: 0.34 X10*3/UL (ref 0–0.4)
EOSINOPHIL NFR BLD AUTO: 4.6 %
ERYTHROCYTE [DISTWIDTH] IN BLOOD BY AUTOMATED COUNT: 15.6 % (ref 11.5–14.5)
GLUCOSE SERPL-MCNC: 79 MG/DL (ref 74–99)
HCT VFR BLD AUTO: 29.5 % (ref 41–52)
HGB BLD-MCNC: 9.4 G/DL (ref 13.5–17.5)
IMM GRANULOCYTES # BLD AUTO: 0.04 X10*3/UL (ref 0–0.5)
IMM GRANULOCYTES NFR BLD AUTO: 0.5 % (ref 0–0.9)
LYMPHOCYTES # BLD AUTO: 1.05 X10*3/UL (ref 0.8–3)
LYMPHOCYTES NFR BLD AUTO: 14.3 %
MAGNESIUM SERPL-MCNC: 2.37 MG/DL (ref 1.6–2.4)
MCH RBC QN AUTO: 28.6 PG (ref 26–34)
MCHC RBC AUTO-ENTMCNC: 31.9 G/DL (ref 32–36)
MCV RBC AUTO: 90 FL (ref 80–100)
MONOCYTES # BLD AUTO: 0.57 X10*3/UL (ref 0.05–0.8)
MONOCYTES NFR BLD AUTO: 7.7 %
NEUTROPHILS # BLD AUTO: 5.33 X10*3/UL (ref 1.6–5.5)
NEUTROPHILS NFR BLD AUTO: 72.5 %
NRBC BLD-RTO: 0 /100 WBCS (ref 0–0)
PHOSPHATE SERPL-MCNC: 2 MG/DL (ref 2.5–4.9)
PLATELET # BLD AUTO: 205 X10*3/UL (ref 150–450)
POTASSIUM SERPL-SCNC: 4.3 MMOL/L (ref 3.5–5.3)
RBC # BLD AUTO: 3.29 X10*6/UL (ref 4.5–5.9)
SODIUM SERPL-SCNC: 135 MMOL/L (ref 136–145)
WBC # BLD AUTO: 7.4 X10*3/UL (ref 4.4–11.3)

## 2024-03-25 PROCEDURE — 83735 ASSAY OF MAGNESIUM: CPT

## 2024-03-25 PROCEDURE — 2500000001 HC RX 250 WO HCPCS SELF ADMINISTERED DRUGS (ALT 637 FOR MEDICARE OP)

## 2024-03-25 PROCEDURE — 51702 INSERT TEMP BLADDER CATH: CPT

## 2024-03-25 PROCEDURE — 99238 HOSP IP/OBS DSCHRG MGMT 30/<: CPT

## 2024-03-25 PROCEDURE — RXMED WILLOW AMBULATORY MEDICATION CHARGE

## 2024-03-25 PROCEDURE — 99497 ADVNCD CARE PLAN 30 MIN: CPT

## 2024-03-25 PROCEDURE — 2500000002 HC RX 250 W HCPCS SELF ADMINISTERED DRUGS (ALT 637 FOR MEDICARE OP, ALT 636 FOR OP/ED)

## 2024-03-25 PROCEDURE — 80069 RENAL FUNCTION PANEL: CPT

## 2024-03-25 PROCEDURE — 2500000005 HC RX 250 GENERAL PHARMACY W/O HCPCS

## 2024-03-25 PROCEDURE — 36415 COLL VENOUS BLD VENIPUNCTURE: CPT

## 2024-03-25 PROCEDURE — 99498 ADVNCD CARE PLAN ADDL 30 MIN: CPT

## 2024-03-25 PROCEDURE — 85025 COMPLETE CBC W/AUTO DIFF WBC: CPT

## 2024-03-25 PROCEDURE — 99233 SBSQ HOSP IP/OBS HIGH 50: CPT

## 2024-03-25 RX ORDER — TORSEMIDE 20 MG/1
20 TABLET ORAL ONCE
Status: COMPLETED | OUTPATIENT
Start: 2024-03-25 | End: 2024-03-25

## 2024-03-25 RX ORDER — CIPROFLOXACIN 500 MG/1
500 TABLET ORAL EVERY 24 HOURS
Qty: 1 TABLET | Refills: 0 | Status: CANCELLED | OUTPATIENT
Start: 2024-03-26 | End: 2024-03-27

## 2024-03-25 RX ORDER — CIPROFLOXACIN 500 MG/1
500 TABLET ORAL EVERY 24 HOURS
Qty: 1 TABLET | Refills: 0 | Status: SHIPPED | OUTPATIENT
Start: 2024-03-26 | End: 2024-03-25 | Stop reason: SDUPTHER

## 2024-03-25 RX ORDER — TORSEMIDE 20 MG/1
20 TABLET ORAL DAILY PRN
Qty: 90 TABLET | Refills: 1 | Status: SHIPPED | OUTPATIENT
Start: 2024-03-25

## 2024-03-25 RX ORDER — BUTYROSPERMUM PARKII(SHEA BUTTER), SIMMONDSIA CHINENSIS (JOJOBA) SEED OIL, ALOE BARBADENSIS LEAF EXTRACT .01; 1; 3.5 G/100G; G/100G; G/100G
250 LIQUID TOPICAL 2 TIMES DAILY
Status: DISCONTINUED | OUTPATIENT
Start: 2024-03-25 | End: 2024-03-25 | Stop reason: HOSPADM

## 2024-03-25 RX ORDER — CIPROFLOXACIN 500 MG/1
500 TABLET ORAL 2 TIMES DAILY
Qty: 2 TABLET | Refills: 0 | Status: SHIPPED | OUTPATIENT
Start: 2024-03-25 | End: 2024-03-26

## 2024-03-25 RX ADMIN — TORSEMIDE 20 MG: 20 TABLET ORAL at 13:57

## 2024-03-25 RX ADMIN — CARBIDOPA AND LEVODOPA 2 TABLET: 25; 100 TABLET ORAL at 15:12

## 2024-03-25 RX ADMIN — ACETAMINOPHEN 650 MG: 325 TABLET ORAL at 11:27

## 2024-03-25 RX ADMIN — LEVOTHYROXINE SODIUM 88 MCG: 0.09 TABLET ORAL at 10:03

## 2024-03-25 RX ADMIN — CARBIDOPA AND LEVODOPA 2 TABLET: 25; 100 TABLET ORAL at 11:22

## 2024-03-25 RX ADMIN — Medication 250 MG: at 13:57

## 2024-03-25 RX ADMIN — TAMSULOSIN HYDROCHLORIDE 0.4 MG: 0.4 CAPSULE ORAL at 10:00

## 2024-03-25 RX ADMIN — CIPROFLOXACIN HYDROCHLORIDE 500 MG: 500 TABLET, FILM COATED ORAL at 13:57

## 2024-03-25 RX ADMIN — CARBIDOPA AND LEVODOPA 2 TABLET: 25; 100 TABLET ORAL at 06:33

## 2024-03-25 RX ADMIN — POTASSIUM PHOSPHATE, MONOBASIC 500 MG: 500 TABLET, SOLUBLE ORAL at 17:31

## 2024-03-25 RX ADMIN — APIXABAN 2.5 MG: 2.5 TABLET, FILM COATED ORAL at 10:00

## 2024-03-25 RX ADMIN — PANTOPRAZOLE SODIUM 40 MG: 40 TABLET, DELAYED RELEASE ORAL at 06:33

## 2024-03-25 RX ADMIN — LIDOCAINE 1 PATCH: 4 PATCH TOPICAL at 09:00

## 2024-03-25 ASSESSMENT — COGNITIVE AND FUNCTIONAL STATUS - GENERAL
WALKING IN HOSPITAL ROOM: A LOT
STANDING UP FROM CHAIR USING ARMS: A LOT
DRESSING REGULAR UPPER BODY CLOTHING: A LOT
TURNING FROM BACK TO SIDE WHILE IN FLAT BAD: A LOT
CLIMB 3 TO 5 STEPS WITH RAILING: A LOT
PERSONAL GROOMING: A LITTLE
DAILY ACTIVITIY SCORE: 13
MOBILITY SCORE: 12
MOVING FROM LYING ON BACK TO SITTING ON SIDE OF FLAT BED WITH BEDRAILS: A LOT
MOVING TO AND FROM BED TO CHAIR: A LOT
DRESSING REGULAR LOWER BODY CLOTHING: A LOT
HELP NEEDED FOR BATHING: A LOT
TOILETING: TOTAL
EATING MEALS: A LITTLE

## 2024-03-25 ASSESSMENT — PAIN SCALES - GENERAL
PAINLEVEL_OUTOF10: 0 - NO PAIN
PAINLEVEL_OUTOF10: 3

## 2024-03-25 ASSESSMENT — PAIN DESCRIPTION - ORIENTATION: ORIENTATION: RIGHT;LEFT

## 2024-03-25 ASSESSMENT — PAIN DESCRIPTION - LOCATION: LOCATION: FOOT

## 2024-03-25 ASSESSMENT — PAIN - FUNCTIONAL ASSESSMENT: PAIN_FUNCTIONAL_ASSESSMENT: 0-10

## 2024-03-25 NOTE — PROGRESS NOTES
Patient scheduled to discharge today with [facility] providing services for patient  will continue to follow for all discharging needs     [facility] soc date 3-

## 2024-03-25 NOTE — HH CARE COORDINATION
Home Care received a Referral for Nursing and Physical Therapy. We have processed the referral for a Start of Care on 24-48 HOURS.     If you have any questions or concerns, please feel free to contact us at [phone]. Follow the prompts, enter your five digit zip code, and you will be directed to your care team on [facility] 1.

## 2024-03-25 NOTE — PROGRESS NOTES
"Brady Ortega is a 79 y.o. male on day 6 of admission presenting with Shock (CMS/HCC).    Subjective   Pt resting comfortably in bed, denies pain, SOB, n/v. Pt's wife at bedside; questions answered and emotional support provided.    Objective     Physical Exam  Vitals reviewed. Exam conducted with a chaperone present.   Constitutional:       General: He is not in acute distress.     Appearance: Normal appearance. He is normal weight. He is ill-appearing.   HENT:      Head: Normocephalic and atraumatic.      Nose: Nose normal.      Mouth/Throat:      Mouth: Mucous membranes are moist.   Eyes:      Extraocular Movements: Extraocular movements intact.      Pupils: Pupils are equal, round, and reactive to light.   Cardiovascular:      Rate and Rhythm: Normal rate and regular rhythm.   Pulmonary:      Effort: Pulmonary effort is normal.      Breath sounds: Normal breath sounds.   Abdominal:      General: Abdomen is flat. Bowel sounds are normal.      Palpations: Abdomen is soft.   Skin:     General: Skin is warm and dry.   Neurological:      General: No focal deficit present.      Mental Status: He is alert and oriented to person, place, and time. Mental status is at baseline.   Psychiatric:         Mood and Affect: Mood normal.         Behavior: Behavior normal.         Thought Content: Thought content normal.         Judgment: Judgment normal.         Last Recorded Vitals  Blood pressure 124/62, pulse 86, temperature 37.2 °C (98.9 °F), temperature source Temporal, resp. rate 20, height 1.6 m (5' 3\"), weight 71 kg (156 lb 8.4 oz), SpO2 98 %.  Intake/Output last 3 Shifts:  I/O last 3 completed shifts:  In: 480 (6.8 mL/kg) [P.O.:480]  Out: 2075 (29.2 mL/kg) [Urine:2075 (0.8 mL/kg/hr)]  Weight: 71 kg     Relevant Results  Scheduled medications  apixaban, 2.5 mg, oral, BID  azelastine, 2 spray, Each Nostril, BID  carbidopa-levodopa, 0.5 tablet, oral, Daily  carbidopa-levodopa, 2 tablet, oral, 4 times per " day  ciprofloxacin, 500 mg, oral, q24h  ipratropium, 2 spray, Each Nostril, BID  levothyroxine, 88 mcg, oral, Daily before breakfast  lidocaine, 1 patch, transdermal, Daily  pantoprazole, 40 mg, oral, Daily before breakfast  perflutren lipid microspheres, 0.5-10 mL of dilution, intravenous, Once in imaging  polyethylene glycol, 17 g, oral, BID  saccharomyces boulardii, 250 mg, oral, BID  tamsulosin, 0.4 mg, oral, Daily  torsemide, 20 mg, oral, Once      Continuous medications     PRN medications  PRN medications: acetaminophen    Results for orders placed or performed during the hospital encounter of 03/19/24 (from the past 24 hour(s))   Renal Function Panel   Result Value Ref Range    Glucose 79 74 - 99 mg/dL    Sodium 135 (L) 136 - 145 mmol/L    Potassium 4.3 3.5 - 5.3 mmol/L    Chloride 104 98 - 107 mmol/L    Bicarbonate 22 21 - 32 mmol/L    Anion Gap 13 10 - 20 mmol/L    Urea Nitrogen 30 (H) 6 - 23 mg/dL    Creatinine 1.45 (H) 0.50 - 1.30 mg/dL    eGFR 48 (L) >60 mL/min/1.73m*2    Calcium 8.6 8.6 - 10.6 mg/dL    Phosphorus 2.0 (L) 2.5 - 4.9 mg/dL    Albumin 2.9 (L) 3.4 - 5.0 g/dL   Magnesium   Result Value Ref Range    Magnesium 2.37 1.60 - 2.40 mg/dL   CBC and Auto Differential   Result Value Ref Range    WBC 7.4 4.4 - 11.3 x10*3/uL    nRBC 0.0 0.0 - 0.0 /100 WBCs    RBC 3.29 (L) 4.50 - 5.90 x10*6/uL    Hemoglobin 9.4 (L) 13.5 - 17.5 g/dL    Hematocrit 29.5 (L) 41.0 - 52.0 %    MCV 90 80 - 100 fL    MCH 28.6 26.0 - 34.0 pg    MCHC 31.9 (L) 32.0 - 36.0 g/dL    RDW 15.6 (H) 11.5 - 14.5 %    Platelets 205 150 - 450 x10*3/uL    Neutrophils % 72.5 40.0 - 80.0 %    Immature Granulocytes %, Automated 0.5 0.0 - 0.9 %    Lymphocytes % 14.3 13.0 - 44.0 %    Monocytes % 7.7 2.0 - 10.0 %    Eosinophils % 4.6 0.0 - 6.0 %    Basophils % 0.4 0.0 - 2.0 %    Neutrophils Absolute 5.33 1.60 - 5.50 x10*3/uL    Immature Granulocytes Absolute, Automated 0.04 0.00 - 0.50 x10*3/uL    Lymphocytes Absolute 1.05 0.80 - 3.00 x10*3/uL     Monocytes Absolute 0.57 0.05 - 0.80 x10*3/uL    Eosinophils Absolute 0.34 0.00 - 0.40 x10*3/uL    Basophils Absolute 0.03 0.00 - 0.10 x10*3/uL     Electrocardiogram, 12-lead PRN ACS symptoms    Result Date: 3/20/2024  Atrial fibrillation Right bundle branch block Septal infarct (cited on or before 22-DEC-2016) Abnormal ECG When compared with ECG of 19-MAR-2024 14:07, Right bundle branch block has replaced Nonspecific intraventricular block    XR chest 1 view    Result Date: 3/20/2024  Interpreted By:  Nhung Cruz and Afshari Mirak Sohrab STUDY: XR CHEST 1 VIEW;  3/20/2024 3:59 am   INDICATION: Signs/Symptoms:CVC placement rij.   COMPARISON: X-ray 03/19/2020   ACCESSION NUMBER(S): [id number]   ORDERING CLINICIAN: CHANA SAMUEL   FINDINGS: AP radiograph of the chest was provided.   Right IJ approach central venous catheter tip projects over right atrium.   CARDIOMEDIASTINAL SILHOUETTE: Cardiomediastinal silhouette is stable in size and configuration, enlarged.   LUNGS: Again noted are prominent perihilar interstitial markings. There are hazy opacities in bilateral lung bases with obscuration of the costophrenic angles.   ABDOMEN: No remarkable upper abdominal findings.   BONES: No acute osseous changes.       1. Cardiomegaly with pulmonary edema. 2. Bilateral pleural effusions with adjacent atelectasis/infiltrate.   I personally reviewed the images/study and I agree with the findings as stated by resident physician Dr. Blaine Bowser . This study was interpreted at [facility], [place], [place].   MACRO: None   Signed by: Nhung Cruz 3/20/2024 8:32 AM Dictation workstation:   [id number]    ECG 12 lead    Result Date: 3/19/2024  Atrial fibrillation Right axis deviation Nonspecific intraventricular block Cannot rule out Anteroseptal infarct (cited on or before 22-DEC-2016) Abnormal ECG When compared with ECG of 22-DEC-2016 10:53, Significant changes have occurred See  ED provider note for full interpretation and clinical correlation Confirmed by Kelley Santos ([id number]) on 3/19/2024 10:53:37 PM    Transthoracic Echo (TTE) Complete    Result Date: 3/19/2024   [facility], [address] [place] [address]                Tel [phone] and Fax [phone] TRANSTHORACIC ECHOCARDIOGRAM REPORT  Patient Name:      MAGNUS LANTIGUA    Reading Physician:    [id number] Melissa Villalba MD Study Date:        3/19/2024            Ordering Provider:    [id number] JOSH PACK MRN/PID:           [id number]             Fellow: Accession#:        [id number]         Nurse: Date of Birth/Age: 1945 / 78 years Sonographer:          Abilio Marshall                                                               [facility] Gender:            M                    Additional Staff:     [id number] Josh Pack MD Height:            160.02 cm            Admit Date:           3/19/2024 Weight:            65.77 kg             Admission Status:     Inpatient - STAT BSA / BMI:         1.69 m2 / 25.69      Encounter#:           [id number]                    kg/m2                                         Department Location:  [facility] Blood Pressure: 89 /67 mmHg Study Type:    TRANSTHORACIC ECHO (TTE) COMPLETE Diagnosis/ICD: Chronic diastolic (congestive) heart failure (CHF)-I50.32 Indication:    amyloidosis, shock CPT Code:      Echo Complete w Full Doppler-40885; Myocardial Strain                Imaging-[id number] Patient History: Pertinent History: Slow afib, parkinsons, CKD III, s/p left lower lobe                    lobectomy, amyloidosis. Study Detail: The following Echo studies were performed: 2D, M-Mode, Doppler,               color flow and Strain. Technically challenging study due to               patient lying in supine position. Agitated saline used as a               contrast  agent for intraseptal flow evaluation.  PHYSICIAN INTERPRETATION: Left Ventricle: The left ventricular systolic function is low normal, with an estimated ejection fraction of 50-55%. The patient is in atrial fibrillation which may influence the estimate of left ventricular function and transvalvular flows. There are no regional wall motion abnormalities. The left ventricular cavity size is normal. There is severe concentric left ventricular hypertrophy. Left Ventricular Global Longitudinal Strain - 12.9 %. Spectral Doppler shows an abnormal pattern of left ventricular diastolic filling. There are elevated left atrial and left ventricular end diastolic pressures. Apical sparing in the apex is noted with impairment of GLS consistent with Amyloid. Left Atrium: The left atrium is severely dilated. A bubble study using agitated saline was performed. Bubble study is positive. A large PFO (> 20 bubbles) was demonstrated. There is evidence of an atrial septal aneurysm. Right Ventricle: The right ventricle is mildly enlarged. There is mildly increased right ventriclar wall thickness. There is mildly reduced right ventricular systolic function. Right Atrium: The right atrium is moderately dilated. Aortic Valve: The aortic valve appears structurally normal. There is mild aortic valve cusp calcification. There is mild aortic valve regurgitation. The peak instantaneous gradient of the aortic valve is 25.2 mmHg. The mean gradient of the aortic valve is 13.0 mmHg. PHT is < 400 ms because of elevated LV filling pressures. Mitral Valve: The mitral valve is normal in structure. There is mild mitral valve regurgitation which is anteriorly directed. Tricuspid Valve: The tricuspid valve is structurally normal. There is moderate tricuspid regurgitation. The Doppler estimated RVSP is moderately elevated at 57.2 mmHg. Pulmonic Valve: The pulmonic valve is structurally normal. There is trace pulmonic valve regurgitation. Pericardium: There  is a small pericardial effusion. Pericardial effusion is localized around RA and LV lateral wall. Aorta: The aortic root is normal. Systemic Veins: The inferior vena cava appears dilated. There is less than 50% IVC collapse with inspiration. In comparison to the previous echocardiogram(s): Compared with study from 12/22/2016, there is progression of LVH and RVH. Now it is more consistent with cardiac amyloid.  CONCLUSIONS:  1. Left ventricular systolic function is low normal with a 50-55% estimated ejection fraction.  2. Apical sparing in the apex is noted with impairment of GLS consistent with Amyloid.  3. Spectral Doppler shows an abnormal pattern of left ventricular diastolic filling.  4. There are elevated left atrial and left ventricular end diastolic pressures.  5. There is severe concentric left ventricular hypertrophy.  6. There is mildly reduced right ventricular systolic function.  7. The left atrium is severely dilated.  8. The right atrium is moderately dilated.  9. Pericardial effusion is localized around RA and LV lateral wall. 10. Moderate tricuspid regurgitation visualized. 11. Moderately elevated right ventricular systolic pressure. 12. Mild aortic valve regurgitation. 13. A bubble study using agitated saline was performed. Bubble study is positive. A large PFO (> 20 bubbles) was demonstrated. 14. Atrial septal aneurysm present. 15. The patient is in atrial fibrillation which may influence the estimate of left ventricular function and transvalvular flows. 16. Compared with study from 12/22/2016, there is progression of LVH and RVH. Now it is more consistent with cardiac amyloid. QUANTITATIVE DATA SUMMARY: 2D MEASUREMENTS:                           Normal Ranges: Ao Root d:     3.30 cm    (2.0-3.7cm) LAs:           4.70 cm    (2.7-4.0cm) IVSd:          1.70 cm    (0.6-1.1cm) LVPWd:         1.70 cm    (0.6-1.1cm) LVIDd:         4.50 cm    (3.9-5.9cm) LVIDs:         3.60 cm LV Mass Index: 198.6 g/m2 LV  % FS        20.0 % LA VOLUME:                               Normal Ranges: LA Vol A4C:        102.4 ml   (22+/-6mL/m2) LA Vol A2C:        70.2 ml LA Vol BP:         85.5 ml LA Vol Index A4C:  60.7ml/m2 LA Vol Index A2C:  41.6 ml/m2 LA Vol Index BP:   50.7 ml/m2 LA Area A4C:       28.5 cm2 LA Area A2C:       23.4 cm2 LA Major Axis A4C: 6.7 cm LA Major Axis A2C: 6.6 cm LA Volume Index:   50.6 ml/m2 AORTA MEASUREMENTS:                    Normal Ranges: Asc Ao, d: 3.80 cm (2.1-3.4cm) LV SYSTOLIC FUNCTION BY 2D PLANIMETRY (MOD):                                          Normal Ranges: EF-A4C View:                      35.9 % (>=55%) EF-A2C View:                      59.4 % EF-Biplane:                       48.0 % Global Longitudinal Strain (GLS): 12.9 % LV DIASTOLIC FUNCTION:                        Normal Ranges: MV Peak E:    1.24 m/s (0.7-1.2 m/s) MV e'         0.08 m/s (>8.0) MV lateral e' 0.11 m/s MV medial e'  0.06 m/s E/e' Ratio:   14.85    (<8.0) a'            0.04 m/s MV DT:        164 msec (150-240 msec) MITRAL VALVE:                 Normal Ranges: MV DT: 164 msec (150-240msec) MITRAL INSUFFICIENCY:                          Normal Ranges: PISA Radius:  0.5 cm MR Alias Mark: 38.5 cm/s MR Flow Rt:   60.48 ml/s AORTIC VALVE:                                    Normal Ranges: AoV Vmax:                2.51 m/s  (<=1.7m/s) AoV Peak P.2 mmHg (<20mmHg) AoV Mean P.0 mmHg (1.7-11.5mmHg) LVOT Max Mark:            0.88 m/s  (<=1.1m/s) AoV VTI:                 39.30 cm  (18-25cm) LVOT VTI:                16.40 cm LVOT Diameter:           2.22 cm   (1.8-2.4cm) AoV Area, VTI:           1.62 cm2  (2.5-5.5cm2) AoV Area,Vmax:           1.35 cm2  (2.5-4.5cm2) AoV Dimensionless Index: 0.42  RIGHT VENTRICLE: RV Basal 4.00 cm RV Mid   2.30 cm RV Major 7.9 cm TAPSE:   14.6 mm RV s'    0.08 m/s TRICUSPID VALVE/RVSP:                             Normal Ranges: Peak TR Velocity: 3.25 m/s RV Syst Pressure:  57.2 mmHg (< 30mmHg) IVC Diam:         2.90 cm PULMONIC VALVE:                      Normal Ranges: PV Max Mark: 0.8 m/s  (0.6-0.9m/s) PV Max P.7 mmHg  [id number] Melissa Villalba MD Electronically signed on 3/19/2024 at 5:32:00 PM  ** Final **     CT chest abdomen pelvis wo IV contrast    Result Date: 3/19/2024  STUDY: CT Chest, Abdomen, and Pelvis without IV Contrast; 2024 at 4:29 PM INDICATION: Shock, unclear etiology. COMPARISON: XR chest of same date, 24. CT chest 24, 23, 22. ACCESSION NUMBER(S): [id number] ORDERING CLINICIAN: HOLLY RUEDA TECHNIQUE: CT of the chest, abdomen, and pelvis was performed.  Contiguous axial images were obtained at 3 mm slice thickness through the chest, abdomen, and pelvis.  Coronal and sagittal reconstructions at 3 mm slice thickness were performed.  No intravenous contrast was administered.  FINDINGS: Please note that the evaluation of vessels, lymph nodes and organs is limited without intravenous contrast. CHEST: MEDIASTINUM: The heart is enlarged and has a small pericardial effusion.  Coronary artery calcifications are identified.  LUNGS/PLEURA: There is small bilateral pleural effusions with associated atelectasis.  The airways are patent. Lungs are clear without consolidation, interstitial disease, or suspicious nodules. LYMPH NODES: Thoracic lymph nodes are not enlarged. ABDOMEN:  LIVER: No hepatomegaly.  The liver has an irregular capsule.  Normal attenuation.  BILE DUCTS: No intrahepatic or extrahepatic biliary ductal dilatation.  GALLBLADDER: The gallbladder is unremarkable. STOMACH: No abnormalities identified.  PANCREAS: No masses or ductal dilatation.  SPLEEN: No splenomegaly or focal splenic lesion.  ADRENAL GLANDS: No thickening or nodules.  KIDNEYS AND URETERS: Kidneys are normal in size and location.  There is a nonobstructing 4 mm stone in the left kidney. There is a stable hyperdense cyst in the right kidney.  PELVIS:  BLADDER: No  abnormalities identified.  REPRODUCTIVE ORGANS: No abnormalities identified.  BOWEL: No abnormalities identified. There is a large amount of stool in the colon.  VESSELS: No abnormalities identified.  Abdominal aorta is normal in caliber.  PERITONEUM/RETROPERITONEUM/LYMPH NODES: No free fluid.  No pneumoperitoneum. There is mild edema throughout the mesentery and subcutaneous fat. No lymphadenopathy.  ABDOMINAL WALL: No abnormalities identified. SOFT TISSUES: No abnormalities identified.  BONES: No acute fracture or aggressive osseous lesion.    CHEST CT: There is cardiomegaly with a small pericardial effusion. There are small bilateral pleural effusions with associated atelectasis. CT A/P: There is an irregular capsule in the liver which may represent cirrhosis. There is mild edema throughout the mesentery and subcutaneous fat. Large amount of stool in the colon. Stable hyperdense cyst in the right kidney. Nonobstructing 4 mm stone in the left kidney. Signed by Eulogio Beauchamp MD    XR chest 1 view    Result Date: 3/19/2024  STUDY: Chest Radiograph;  3/19/2024 at 2:31 PM. INDICATION: Sepsis workup. COMPARISON: CT chest 3/6/2024. ACCESSION NUMBER(S): [id number] ORDERING CLINICIAN: HOLLY RUEDA TECHNIQUE:  Frontal chest was obtained at 14:31 hours. FINDINGS: CARDIOMEDIASTINAL SILHOUETTE: Cardiomediastinal silhouette is normal in size and configuration.  LUNGS: There is apparent residual left basilar pneumothorax which appears similar to prior study.  Lungs demonstrate chronic changes.  There is no acute airspace opacity.  ABDOMEN: No remarkable upper abdominal findings.  BONES: No acute osseous changes.    Left basilar pneumothorax appears unchanged compared with prior study.  Cardiomegaly appears stable.  Chronic changes.  No acute process. Signed by Jeison Hernandez DO    Point of Care Ultrasound    Result Date: 3/19/2024  Holly Rueda MD     3/19/2024 11:50 PM Performed by: Holly Rueda MD Authorized by:  Jb Oquendo MD  Cardiac Indications: hypotension Procedure: Cardiac Ultrasound Findings:  Views: parasternal long, parasternal short, apical four and subxiphoid Effusion: The pericardial space was visualized and was positive for a PERICARDIAL EFFUSION. Activity: Ventricular contractions were visualized. LV: LV systolic function was DECREASED. RV: RV size was NORMAL. Impression: Cardiac: The focused cardiac ultrasound exam had ABNORMAL findings as specified. Comments: Plethoric, non-collapsing IVC    CT chest wo IV contrast    Result Date: 3/6/2024  Interpreted By:  José Michele and Barbat Antonio STUDY: CT CHEST WO IV CONTRAST;  3/6/2024 9:43 am   INDICATION: Signs/Symptoms: follow up lung cancer  C34.90: Primary lung adenocarcinoma.   COMPARISON: None.   ACCESSION NUMBER(S): [id number]   ORDERING CLINICIAN: ALDO SCOTT   TECHNIQUE: Helical data acquisition of the chest was obtained  without IV contrast material.  Images were reformatted in axial, coronal, and sagittal planes.   FINDINGS: LUNGS AND AIRWAYS: The trachea and central airways are patent. No endobronchial lesion.   The patient is status post left lower lobectomy. Post basilar lobectomy changes with residual left basilar hydropneumothorax. There is a small right-sided pleural effusion with right basilar atelectasis. There are calcified granulomas. There are no suspicious lung nodules.   MEDIASTINUM AND PACO, LOWER NECK AND AXILLA: The visualized thyroid gland is within normal limits.   No evidence of thoracic lymphadenopathy by CT criteria.   Esophagus appears within normal limits as seen.   HEART AND VESSELS: Mild atherosclerotic calcifications of the thoracic aorta.   Main pulmonary artery and its branches are normal in caliber.   There are severe coronary artery calcifications. The study is not optimized for evaluation of coronary arteries.   There are global chamber enlargement.   No evidence of pericardial effusion.   UPPER ABDOMEN:  There is a large left exophytic renal cyst. There are pancreatic calcifications. Segment 5 hepatic cysts.   CHEST WALL AND OSSEOUS STRUCTURES: There are no suspicious osseous lesions. Multilevel degenerative changes are present There is multilevel degenerative changes of the thoracic spine. There is anasarca.       1. Stable left basilar hydropneumothorax status post left lower lobectomy. 2. Bilateral pleural effusions anasarca and global chamber enlargement. Correlate with cardiac and fluid status. Extensive coronary artery calcifications and correlate with obstructive coronary artery disease. 3. No suspicious lung nodules.   MACRO: None   Signed by: José Michele 3/6/2024 2:47 PM Dictation workstation:   [id number]         Assessment/Plan   Brady Ortega is a 79 y.o. male with past medical history of Parkinsonism, CKD stage 3 (baseline 1.3-1.6), Afib on Eliquis, HTN, CAD, Hx of lung Ca s/p LLL lobectomy, HFpEF (EF 50-55% March/2024) 2/2 suspected cardiac amyloidosis, and recent admission to [facility] 2/16-2/22 for Sepsis 2/2 cellulitis of RLL who presented to the [facility] ED from home with hypotension and lethargy being treated for urosepsis shock. Patient off pressors since 03/21/24, Blood culture 03/19/2024 negative, Urine culture 03/18/2024 with Pseudomona, plan to continue Zosyn IV until 03/23/2024 then transition PO for total 7 day course for complicated UTI. Lines removed and patient medically ready for the floor. Urology evaluated and despite recurrent infections as family and patient having difficulty with CIC will maintain sherwood catheter at discharge due to ongoing urinary retention. Palliative Care consulted for ongoing goals of care and discharge planning. Per discussions, family currently wishing patient to go home after discharge. Palliative Care helping with HHA and equipment at home.      Palliative Performance Scale (PPS): 40%      ----------------------------------------------------------------------------------------------------------------------------------------------------------------------------------------------------------------------------------------------------------------------------------------------------------------------------------------------------------------------  Advanced Care Planning  Patient and family consented to a voluntary Advanced Care Planning meeting.   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      Disease Specific Information Provided/Prognosis Discussed: Patient's current clinical condition, including diagnosis, prognosis, and management plan were discussed.   Counseling provided on goals of care, discharge expectations, and outpatient Palliative Care services.   Counseling provided on guarded prognosis and what to expect with disease progression of Cardiac Amyloidosis.   Counseling provided on the irreversible and progressive nature of patient's diseases including Parkinson's Disease, and Heart Failure.      Understanding/Overall Impression: Patient and family expressing clear understanding of overall health status and severity of illness.      Goals/Hopes: Discussion ensued about patient's goals for their medical care going forward. Allowed patient time to talk about his current quality of life, disease course/progression, and symptom and treatment burden. Discussed care plan to continue with aggressive hospital care despite symptom and treatment burden versus choosing to transition to comfort based plan of care that focuses on symptom management and quality of life. Pt's and family goal to be discharged home with [facility] and Outpatient Palliative services when ready to leave the hospital.       Fears/Worries/Concerns: Pt worried about going back to nursing home and feels he is in better spirits and gets stronger faster being in home setting.      Minimal Acceptable Quality of Life/Maximal [place] Tolerable for the Possibility of More Time: Counseling provided on the concept of MAO/Maximal [place]. Patient expressing that they would never want to be in a health state where they were dependent on other's for ADLs/toileting needs, bed bound, intubated or placed on a ventilator, have a permanent feeding tube if they could not eat, have a tracheostomy placed. Patient deems that this would not be an acceptable quality of life for the patient.      Resuscitation Assessment: Counseling provided on the benefit versus burden of CPR in the setting of patient's overall health status and pt is DNR/DNI per pt and family wishes.      Advanced Directives:  Counseling provided on the importance of not crisis planning as disease burden progresses but to establish treatment limitations now so in the future medical team will be clear on what patient feels is an acceptable quality of life for the patient and what treatment limitations' patient would like set into place based on that.        Surrogate Health Care Decision Maker: Elsa Ortega [phone]  HPOA: Yes, on file.   Living will: Yes, on file.      Code Status: Decision to keep code status DNR/DNI at this time.      Hospice Discussion/Eligibility: Counseling provided on the benefit of Hospice Services in the setting of patient's Cardiac Amyloidosis, Heart Failure, Parkinson's Disease, and Urosepsis to keep patient out of the hospital while supporting patient and family by providing counseling, aggressive symptom management, prioritizing comfort and quality of life, alleviation of suffering, and allowing patient to pass with comfort and dignity.  Patient/Family Impression: Pt and family would like home health care services at time of discharge from hospital.   All  questions and concerns were addressed during encounter.      I spent 60 minutes in providing separately identifiable ACP services with the patient and/or surrogate decision maker in a voluntary conversation discussing the patient's wishes and goals as detailed in the above note.   ----------------------------------------------------------------------------------------------------------------------------------------------------------------------------------------------------------------------------------------------------------------------------------------------------------------------------------------------------------------------     #Complex Medical Decision Making  #Goals of Care  #Advanced Care Planning  - Code status: DNR/DNI  - Surrogate decision maker: Elsa Ortega [phone] patient's wife  - Goals are mix of survival and time and improved quality of life  - Advanced Directives on file   - Recommend outpatient Palliative Care referral at time of discharge     #Weakness  - Recommend PT and OT as ordered     #Insomnia  - Recommend Melatonin 3mg at bedtime PRN insomnia     #Psychosocial Support  - Music Therapy consult  - Spiritual Care Support  - Art Therapy consult  - Palliative SW support     Plan of Care discussed with: Updated MD primary team and bedside RN on goals of care decision, medication adjustments, and code status. Reviewed outpatient palliative care plan with pt and pt's wife.     Medical Decision Making was high level due to high complexity of problems, extensive data review, and high risk of management/treatment.     - Cardiac Amyloidosis and Urosepsis posing threat to life and function  - Reviewed external notes from [facility], outpatient visits, [facility] Hospitalization 02/16/2024-02/22/2024  - Assessment required independent historian: Pt's wife and daughter  - Discussion of management with primary team  - Drug therapy requiring intensive monitoring for toxicity: IV Zosyn          Thank you for allowing us to participate in the care of this patient. Palliative will continue to follow as needed. Palliative medicine is available Monday-Friday, 8a-6p. Please contact team with any questions or concerns.  Team pager [phone]  XI Amaya-CNP

## 2024-03-25 NOTE — PROGRESS NOTES
Spiritual Care Visit     People who were present: Patient, patient's spouse, Hylah, Palliative CNP and Ethics Learner    Topics discussed: Patient's condition, discharge options and preferences, coping, systems of support    Interventions: Provided non-anxious, supportive presence, reflective listening, affirmation and normalization of experience, naming of emotions    Plan of Care: Palliative  to provide ongoing spiritual care

## 2024-03-25 NOTE — DISCHARGE SUMMARY
Discharge Diagnosis  Shock (CMS/HCC)    Issues Requiring Follow-Up  [ ] RFP next week for low phosphate levels    Appointments needed   [ ] PCP for post-hospitalization follow-up  [ ] Cardiology follow-up for further work-up of amyloidosis  [ ] Urology follow-up for further urinary care. You should have received supplies and teaching regarding this.     Test Results Pending At Discharge  Pending Labs       No current pending labs.          Hospital Course  Patient admitted to the CICU for septic shock on a poor substrate given cardiac amyloid. Patient started on broad spectrum Abx (vanc, pip/tazo) and later vancomycin was stopped d/t gram negative bacteria on urine.  Patient has been off of pressors since 3/21.  Urine culture speciated for pansensitive Pseudomonas.  Blood cultures returned negative.  Zosyn (3/19-3/22) was continued and changed to p.o. ciprofloxacin to complete his course for complicated UTI. Palliative care was consulted for ongoing goals of care and home-going plans.  Family wishing patient to go home after discharge.  Urology evaluated the patient and despite recurrent infections, the patient will be discharged with a Denny catheter with outpatient follow-up. Patient transferred to the floor and received oral antibiotics. He was given one dose of torsemide 20 mg for diuresis given peripheral edema. He had noted improvement in his kidney function. The patient was noted to have a low phosphorus level, and he was given a dose prior to discharge with outpatient follow-up.     To Do:  [ ] Renal function panel in 1 week.     Follow-up:  [ ] PCP for post-hospitalization follow-up. Please note, Jardiance held at discharge given urinary tract infections.   [ ] Cardiology follow-up for further work-up of amyloidosis  [ ] Urology follow-up for further urinary care.   [ ] Outpatient follow-up with palliative care.    Pertinent Physical Exam At Time of Discharge  Visit Vitals  /62 (BP Location: Left arm,  "Patient Position: Sitting)   Pulse 86   Temp 37.2 °C (98.9 °F) (Temporal)   Resp 20   Ht 1.6 m (5' 3\")   Wt 71 kg (156 lb 8.4 oz) Comment: with boots   SpO2 98%   BMI 27.73 kg/m²   Smoking Status Former   BSA 1.78 m²      GEN: Awake, alert, no acute distress  HEENT: Normocephalic, atraumatic  CV: Normal S1 and S2, no murmurs, rubs, or gallops, elevated JVD  PULM: Clear to auscultation bilaterally  ABD: Nondistended  EXTREM: Peripheral edema bilaterally  NEURO: Resting tremor present on the right    Home Medications     Medication List      CHANGE how you take these medications     torsemide 20 mg tablet; Commonly known as: Demadex; Take 1 tablet (20   mg) by mouth once daily as needed (Please take 20mg torsemide daily if   weight increases >2 lbs over a two day period. Please contact cardiologist   at those times for further instructions.).; What changed: how much to   take, how to take this, when to take this, reasons to take this,   additional instructions     CONTINUE taking these medications     apixaban 2.5 mg tablet; Commonly known as: Eliquis   atorvastatin 20 mg tablet; Commonly known as: Lipitor   azelastine 137 mcg (0.1 %) nasal spray; Commonly known as: Astelin   cholecalciferol 25 MCG (1000 UT) capsule; Commonly known as: Vitamin D-3   ciprofloxacin 500 mg tablet; Commonly known as: Cipro; Take 1 tablet   (500 mg) by mouth 2 times a day for 2 doses.   coenzyme Q-10 100 mg capsule   diclofenac sodium 1 % gel; Commonly known as: Voltaren   famotidine 20 mg tablet; Commonly known as: Pepcid   glucosamine-chondroit-vit C-Mn 500-400 mg capsule   ipratropium 21 mcg (0.03 %) nasal spray; Commonly known as: Atrovent   levothyroxine 88 mcg tablet; Commonly known as: Synthroid, Levoxyl   methocarbamol 500 mg tablet; Commonly known as: Robaxin; Take 1 tablet   (500 mg) by mouth 2 times a day as needed for muscle spasms.   multivitamin tablet   olopatadine 0.1 % ophthalmic solution; Commonly known as: Patanol   " tamsulosin 0.4 mg 24 hr capsule; Commonly known as: Flomax; Take 1   capsule (0.4 mg) by mouth once daily.     STOP taking these medications     acetaminophen 500 mg tablet; Commonly known as: Tylenol   carbidopa-levodopa  mg tablet; Commonly known as: Sinemet   cinacalcet 30 mg tablet; Commonly known as: Sensipar   gabapentin 100 mg capsule; Commonly known as: Neurontin   metOLazone 2.5 mg tablet; Commonly known as: Zaroxolyn   tadalafil 5 mg tablet; Commonly known as: Cialis   terazosin 5 mg capsule; Commonly known as: Hytrin   traMADol 50 mg tablet; Commonly known as: Ultram       Outpatient Follow-Up  Future Appointments   Date Time Provider Department [place]   4/2/2024 10:30 AM Heath Betancur MD [id number] [facility]   4/11/2024 11:30 AM Lino Berry MD [facility]   3/4/2025 10:00 AM [facility] CT 1 UCT [facility]   3/5/2025  4:15 PM Marlo Reynoso MD [address]       Kelsi Boyer MD

## 2024-03-25 NOTE — DISCHARGE INSTRUCTIONS
Dear Mikaela Jordan,    It was a privilege taking care of you at [facility]. You were admitted to the hospital because you had low blood pressure and lethargy due to a urinary tract infection. You were treated for a urinary tract infection with antibiotics. You still have one more day of antibiotics to take.     Please continue taking all of your other medications as supplied. Your torsemide (water pill) has been changed to as needed. If you find yourself gaining more than 2 pounds in 2 days, you can take this medication. Do not take Jardiance right now. Please follow-up with your outpatient providers on when to resume the medication. You may take a probiotic if needed for diarrhea.     Please be on the lookout for the following symptoms: confusion, dizziness, lightheadedness, chest pain, shortness of breath, fever, chills. This may be a sign that you have an infection and need to be treated.     You will get lab work in 1 week to monitor your phosphate levels. You received a dose of potassium phosphate prior to being discharged from the hospital.     Please note the follow-ups:  [ ] PCP for post-hospitalization follow-up  [ ] Cardiology follow-up for further work-up of amyloidosis  [ ] Urology follow-up for further urinary care. You should have received supplies and teaching regarding this.   [ ] Outpatient follow-up with palliative care  [ ] Renal function panel in 1 week.     We wish you the best on your healthcare journey!    Your  Care Team

## 2024-03-26 ENCOUNTER — TELEPHONE (OUTPATIENT)
Dept: UROLOGY | Facility: HOSPITAL | Age: 79
End: 2024-03-26

## 2024-03-26 RX ORDER — CARBIDOPA AND LEVODOPA 25; 100 MG/1; MG/1
TABLET ORAL
COMMUNITY
Start: 2018-12-13

## 2024-03-26 NOTE — TELEPHONE ENCOUNTER
Called and spoke to patient's wife and offered appointment today with PA for [facility] teaching but wife doesn't think she can get the patient out of the house. Cath teaching completed over the phone with patient's wife. Patient's wife verbalized understanding and all questions were answered.    Radha Nicole RN      ----- Message from Brady Ortega sent at 3/25/2024  9:37 PM EDT -----  Regarding: Medication probelm with Carbidopa-Levodopa 25/100  Contact: [phone]  Trace,  For some reason when Brady left the hospital the doctors took him off of his Carbidopa which he needs for his Parkinsons. Nobody went over that with me or I would have questioned that. Can you get that put back into his medication list? He takes 2 tablets 4 times a day. I know that this is not your field, but none of the doctors that saw him in the hospital are on the send a message list.    Just for your information, nobody other than your resident came to see Brady from the Urology department. It was my understanding that somebody would come to see us and walk us through the catheter instructions. They told the cardiology doctors that they coudn't see him.  The nurse on the floor helped get some supplies and showed us what to do, but she couldn't answer all of our questions.    Thanks for everything! We really do appreciate it. At least we are home now.    Elsa

## 2024-03-26 NOTE — PROGRESS NOTES
Music Therapy Note    Brady Ortega     Therapy Session  Referral Type: New referral this admission  Visit Type: New visit  Session Start Time: 1511  Session End Time: 1512  Intervention Delivery: In-person  Conflict of Service: Declined treatment  Number of family members present: 1               Treatment/Interventions       Post-assessment  Total Session Time (min): 1 minutes    Narrative  Assessment Detail: Pt declined session due to d/c soon. MT educated on services if desired in the future    Education Documentation  No documentation found.

## 2024-04-02 ENCOUNTER — APPOINTMENT (OUTPATIENT)
Dept: ORTHOPEDIC SURGERY | Facility: CLINIC | Age: 79
End: 2024-04-02
Payer: COMMERCIAL

## 2024-04-03 LAB
ATRIAL RATE: 78 BPM
Q ONSET: 215 MS
QRS COUNT: 13 BEATS
QRS DURATION: 128 MS
QT INTERVAL: 430 MS
QTC CALCULATION(BAZETT): 490 MS
QTC FREDERICIA: 469 MS
R AXIS: 133 DEGREES
T AXIS: -18 DEGREES
T OFFSET: 430 MS
VENTRICULAR RATE: 78 BPM

## 2024-04-11 ENCOUNTER — OFFICE VISIT (OUTPATIENT)
Dept: UROLOGY | Facility: HOSPITAL | Age: 79
End: 2024-04-11
Payer: COMMERCIAL

## 2024-04-11 DIAGNOSIS — R33.8 BENIGN PROSTATIC HYPERPLASIA WITH URINARY RETENTION: Primary | ICD-10-CM

## 2024-04-11 DIAGNOSIS — N40.1 BENIGN PROSTATIC HYPERPLASIA WITH URINARY RETENTION: Primary | ICD-10-CM

## 2024-04-11 PROCEDURE — 1111F DSCHRG MED/CURRENT MED MERGE: CPT | Performed by: UROLOGY

## 2024-04-11 PROCEDURE — 99204 OFFICE O/P NEW MOD 45 MIN: CPT | Performed by: UROLOGY

## 2024-04-11 PROCEDURE — 51700 IRRIGATION OF BLADDER: CPT | Performed by: UROLOGY

## 2024-04-11 PROCEDURE — 1159F MED LIST DOCD IN RCRD: CPT | Performed by: UROLOGY

## 2024-04-11 PROCEDURE — 99214 OFFICE O/P EST MOD 30 MIN: CPT | Performed by: UROLOGY

## 2024-04-11 PROCEDURE — 1157F ADVNC CARE PLAN IN RCRD: CPT | Performed by: UROLOGY

## 2024-04-11 NOTE — PROGRESS NOTES
HPI  Per Dr. Hankins 3/7/24:  Pt was recently hospitalized on 2/16/23 for cellulitis and sepsis also with acute kidney injury. He was placed on Tamsulosin recently and I explained it can take a few weeks for medication to fully begin to working. Denny was removed this morning and was unable to urinate even with an urge. Pt has been straight cath twice. He was able to urinate prior to leaving the hospital but now he is unable to do so. Pt declined placing a new catheter. Also, offered low dose Cialis 5mg to help with urination. Will try daily dosing Cialis. Pt denies nitrates, explained. Will set follow up for Dr. Berry in 2 months, pt agrees with plan.  ___________________________________________________________________________________________  4/11/24 - 79 y.o.  M seen today for consult due to hx of BEKA, retention requiring catheterization to void. Currently taking Cialis 5mg po qd to help with urination. Reports that he has intermittently been able to void well prior to BEKA. Interested in surgery at this time to hopefully alleviate full time need for catheterization.    TOV performed, passed 100%.    LUTS  Urinary symptoms include: Retention , UTIs, and unhappy with his urinary symptoms      CT C/A/P 3/19/24:  Stable hyperdense cyst in the right kidney.  Nonobstructing 4 mm stone in the left kidney.    RBUS 2/20/24:  -IMPRESSION:   NO HYDRONEPHROSIS.   -Nonobstructing LEFT nephrolithiasis.   -Multiple bilateral renal cysts, some minimally complex.   -1.2 cm echogenic indeterminate RIGHT interpolar renal lesion corresponds   to above fluid attenuation lesion on prior CT and is small to   characterize may represent a hyperdense cyst or small renal neoplasm.     Current Medications:  Current Outpatient Medications   Medication Sig Dispense Refill    apixaban (Eliquis) 2.5 mg tablet Take 1 tablet (2.5 mg) by mouth 2 times a day.      atorvastatin (Lipitor) 20 mg tablet Take 1 tablet (20 mg) by mouth once daily.       azelastine (Astelin) 137 mcg (0.1 %) nasal spray Administer 2 sprays into each nostril 2 times a day. Use in each nostril as directed      carbidopa-levodopa (Sinemet)  mg tablet take 2 tablets by mouth daily AT 7AM and AT 11AM and take 2 and 1/2 tablets daily AT 3PM and AT 7PM      cholecalciferol (Vitamin D-3) 25 MCG (1000 UT) capsule Take 1 capsule (25 mcg) by mouth once daily.      coenzyme Q-10 100 mg capsule Take 2 capsules (200 mg) by mouth once daily.      diclofenac sodium (Voltaren) 1 % gel Apply 4.5 inches (4 g) topically 2 times a day as needed. To lower back      famotidine (Pepcid) 20 mg tablet Take by mouth once daily at bedtime.      glucosamine-chondroit-vit C-Mn 500-400 mg capsule Take 2 capsules by mouth once daily.      ipratropium (Atrovent) 21 mcg (0.03 %) nasal spray Administer 2 sprays into each nostril 2 times a day.      levothyroxine (Synthroid, Levoxyl) 88 mcg tablet Take 1 tablet (88 mcg) by mouth once daily in the morning. Take before meals.      methocarbamol (Robaxin) 500 mg tablet Take 1 tablet (500 mg) by mouth 2 times a day as needed for muscle spasms. 40 tablet 0    multivitamin tablet Take 1 tablet by mouth once daily.      olopatadine (Patanol) 0.1 % ophthalmic solution Administer 1 drop into both eyes 2 times a day.      tamsulosin (Flomax) 0.4 mg 24 hr capsule Take 1 capsule (0.4 mg) by mouth once daily. 90 capsule 2    torsemide (Demadex) 20 mg tablet Take 1 tablet (20 mg) by mouth once daily as needed (Please take 20mg torsemide daily if weight increases >2 lbs over a two day period. Please contact cardiologist at those times for further instructions.). 90 tablet 1     No current facility-administered medications for this visit.        Active Problems:  Brady Ortega is a 79 y.o. male with the following Problems and Medications.  Patient Active Problem List   Diagnosis    Physical deconditioning    Cellulitis of right leg    Urine retention    Dysuria    Acute  kidney injury superimposed on CKD (CMS/HCC)    Hyponatremia    Anemia    Atrial fibrillation (CMS/HCC)    Parkinson's disease without dyskinesia or fluctuating manifestations (CMS/[facility])    Heart failure with preserved ejection fraction (CMS/HCC)    Neuropathy    Chronic back pain    Hypothyroidism    Hyperparathyroidism (CMS/HCC)    Muscle spasms of neck    Anasarca    Other superficial bite of lip, initial encounter    Discharge planning issues    Shock (CMS/[facility])    Sepsis with acute organ dysfunction and septic shock (CMS/[facility])    Wild-type transthyretin-related (ATTR) amyloidosis (CMS/[facility])    Goals of care, counseling/discussion    Hypophosphatemia     Current Outpatient Medications   Medication Sig Dispense Refill    apixaban (Eliquis) 2.5 mg tablet Take 1 tablet (2.5 mg) by mouth 2 times a day.      atorvastatin (Lipitor) 20 mg tablet Take 1 tablet (20 mg) by mouth once daily.      azelastine (Astelin) 137 mcg (0.1 %) nasal spray Administer 2 sprays into each nostril 2 times a day. Use in each nostril as directed      carbidopa-levodopa (Sinemet)  mg tablet take 2 tablets by mouth daily AT 7AM and AT 11AM and take 2 and 1/2 tablets daily AT 3PM and AT 7PM      cholecalciferol (Vitamin D-3) 25 MCG (1000 UT) capsule Take 1 capsule (25 mcg) by mouth once daily.      coenzyme Q-10 100 mg capsule Take 2 capsules (200 mg) by mouth once daily.      diclofenac sodium (Voltaren) 1 % gel Apply 4.5 inches (4 g) topically 2 times a day as needed. To lower back      famotidine (Pepcid) 20 mg tablet Take by mouth once daily at bedtime.      glucosamine-chondroit-vit C-Mn 500-400 mg capsule Take 2 capsules by mouth once daily.      ipratropium (Atrovent) 21 mcg (0.03 %) nasal spray Administer 2 sprays into each nostril 2 times a day.      levothyroxine (Synthroid, Levoxyl) 88 mcg tablet Take 1 tablet (88 mcg) by mouth once daily in the morning. Take before meals.      methocarbamol (Robaxin) 500 mg tablet Take 1 tablet  (500 mg) by mouth 2 times a day as needed for muscle spasms. 40 tablet 0    multivitamin tablet Take 1 tablet by mouth once daily.      olopatadine (Patanol) 0.1 % ophthalmic solution Administer 1 drop into both eyes 2 times a day.      tamsulosin (Flomax) 0.4 mg 24 hr capsule Take 1 capsule (0.4 mg) by mouth once daily. 90 capsule 2    torsemide (Demadex) 20 mg tablet Take 1 tablet (20 mg) by mouth once daily as needed (Please take 20mg torsemide daily if weight increases >2 lbs over a two day period. Please contact cardiologist at those times for further instructions.). 90 tablet 1     No current facility-administered medications for this visit.       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    Hordeolum internum left lower eyelid 2015    Hordeolum internum of left lower eyelid    Noninfective gastroenteritis and colitis, unspecified     Colitis    Other bursitis of knee, unspecified knee     Knee bursitis    Personal history of other diseases of the respiratory system     History of asthma    Personal history of other endocrine, nutritional and metabolic disease     History of hyperlipidemia       PSH:  Past Surgical History:   Procedure Laterality Date    BACK SURGERY  11/15/2013    Back Surgery    HERNIA REPAIR  11/15/2013    Hernia Repair    LUNG LOBECTOMY  2017    Lung Lobectomy    OTHER SURGICAL HISTORY  2017    Wedge Resection Of Lung    TOTAL HIP ARTHROPLASTY  11/15/2013    Hip Replacement    VASECTOMY  11/15/2013    Surgery Vas Deferens Vasectomy       FMH:  No family history on file.    SHx:  Social History     Tobacco Use    Smoking status: Former     Current packs/day: 0.00     Types: Cigarettes     Quit date: 1965     Years since quittin.3     Passive exposure: Past    Smokeless  tobacco: Never   Substance Use Topics    Alcohol use: Never    Drug use: Never       Allergies:  Allergies   Allergen Reactions    Pollen Extracts Unknown     Sneezing,itching,watery eyes    Bactrim [Sulfamethoxazole-Trimethoprim] Unknown    Risedronate Unknown     Bone pain    Sulfa (Sulfonamide Antibiotics) Unknown     Assessment/Plan  Patient has history of BEKA with urinary retention. He was seen in consult today for an outlet procedure in hopes to become catheter free. We discussed that even with an outlet procedure at this time, I cannot promise that he would become 100% catheter free. I suspect that this could be a bladder functionality issue overall. After reviewing his CT, there is a hip artifact so I cannot gauge the size of his prostate in total, but I do not believe that it is relatively large. I recommended a cystoscopy to further evaluate his channel prior to surgery, but I would hold a spot for surgery as of today.    We discussed surgical options for his prostate and bothersome LUTS. We discussed various options including TURP, greenlight, Rezum, Urolift, HoLEP. We discussed HoLEP as a size-independent procedure that maximally de-obstructs the prostate with relatively less postop healing and recovery as compared to other modalities. We discussed the surgery in detail. Discussed the risks of bleeding, infection, scarring, transient incontinence, rare (<1%) risk of long-term incontinence. Discussed the perioperative pathway. Discussed the near certainty of permanent ejaculatory dysfunction, but likely no change to his baseline erectile function.      For now, patient will do TOV today. TOV passed today, will follow up in 2 weeks via thv for a symptom check. Patient understands that if he is unable to void, he must either return to office or report to ED to place catheter. If back into retention or bothersome LUTS, will revisit surgery at that time.     Scribe Attestation  By signing my name below, I,  Jigar Joseph, attest that this documentation  has been prepared under the direction and in the presence of Lino Berry MD.

## 2024-04-19 ENCOUNTER — OFFICE VISIT (OUTPATIENT)
Dept: UROLOGY | Facility: HOSPITAL | Age: 79
End: 2024-04-19
Payer: COMMERCIAL

## 2024-04-19 DIAGNOSIS — N13.8 BENIGN PROSTATIC HYPERPLASIA WITH URINARY OBSTRUCTION: Primary | ICD-10-CM

## 2024-04-19 DIAGNOSIS — R39.12 WEAK URINARY STREAM: ICD-10-CM

## 2024-04-19 DIAGNOSIS — R30.0 DYSURIA: ICD-10-CM

## 2024-04-19 DIAGNOSIS — N40.1 BENIGN PROSTATIC HYPERPLASIA WITH URINARY OBSTRUCTION: Primary | ICD-10-CM

## 2024-04-19 PROCEDURE — 99214 OFFICE O/P EST MOD 30 MIN: CPT | Performed by: UROLOGY

## 2024-04-19 PROCEDURE — 1111F DSCHRG MED/CURRENT MED MERGE: CPT | Performed by: UROLOGY

## 2024-04-19 PROCEDURE — 1159F MED LIST DOCD IN RCRD: CPT | Performed by: UROLOGY

## 2024-04-19 PROCEDURE — 1157F ADVNC CARE PLAN IN RCRD: CPT | Performed by: UROLOGY

## 2024-04-19 PROCEDURE — 51798 US URINE CAPACITY MEASURE: CPT | Performed by: UROLOGY

## 2024-04-19 RX ORDER — FINASTERIDE 5 MG/1
5 TABLET, FILM COATED ORAL DAILY
Qty: 30 TABLET | Refills: 11 | Status: SHIPPED | OUTPATIENT
Start: 2024-04-19 | End: 2024-04-22 | Stop reason: SDUPTHER

## 2024-04-19 NOTE — PROGRESS NOTES
HPI  Per Dr. Hankins 3/7/24:  Pt was recently hospitalized on 2/16/23 for cellulitis and sepsis also with acute kidney injury. He was placed on Tamsulosin recently and I explained it can take a few weeks for medication to fully begin to working. Denny was removed this morning and was unable to urinate even with an urge. Pt has been straight cath twice. He was able to urinate prior to leaving the hospital but now he is unable to do so. Pt declined placing a new catheter. Also, offered low dose Cialis 5mg to help with urination. Will try daily dosing Cialis. Pt denies nitrates, explained. Will set follow up for Dr. Berry in 2 months, pt agrees with plan.  ___________________________________________________________________________________________  4/11/24 - 79 y.o.  M seen today for consult due to hx of BEKA, retention requiring catheterization to void. Currently taking Cialis 5mg po qd to help with urination. Reports that he has intermittently been able to void well prior to BEKA. Interested in surgery at this time to hopefully alleviate full time need for catheterization.    TOV performed, passed 100%.    LUTS  Urinary symptoms include: Retention , UTIs, and unhappy with his urinary symptoms    CT C/A/P 3/19/24:  Stable hyperdense cyst in the right kidney.  Nonobstructing 4 mm stone in the left kidney.    RBUS 2/20/24:  -IMPRESSION:   NO HYDRONEPHROSIS.   -Nonobstructing LEFT nephrolithiasis.   -Multiple bilateral renal cysts, some minimally complex.   -1.2 cm echogenic indeterminate RIGHT interpolar renal lesion corresponds   to above fluid attenuation lesion on prior CT and is small to   characterize may represent a hyperdense cyst or small renal neoplasm.     4/19/24 - seen today for 2 week PVR. PVR 40cc.  Stream is on the weaker side.  Having some dysuria since catheter was removed, getting better.    Current Medications:  Current Outpatient Medications   Medication Sig Dispense Refill    apixaban (Eliquis) 2.5 mg  tablet Take 1 tablet (2.5 mg) by mouth 2 times a day.      atorvastatin (Lipitor) 20 mg tablet Take 1 tablet (20 mg) by mouth once daily.      azelastine (Astelin) 137 mcg (0.1 %) nasal spray Administer 2 sprays into each nostril 2 times a day. Use in each nostril as directed      carbidopa-levodopa (Sinemet)  mg tablet take 2 tablets by mouth daily AT 7AM and AT 11AM and take 2 and 1/2 tablets daily AT 3PM and AT 7PM      cholecalciferol (Vitamin D-3) 25 MCG (1000 UT) capsule Take 1 capsule (25 mcg) by mouth once daily.      coenzyme Q-10 100 mg capsule Take 2 capsules (200 mg) by mouth once daily.      diclofenac sodium (Voltaren) 1 % gel Apply 4.5 inches (4 g) topically 2 times a day as needed. To lower back      famotidine (Pepcid) 20 mg tablet Take by mouth once daily at bedtime.      glucosamine-chondroit-vit C-Mn 500-400 mg capsule Take 2 capsules by mouth once daily.      ipratropium (Atrovent) 21 mcg (0.03 %) nasal spray Administer 2 sprays into each nostril 2 times a day.      levothyroxine (Synthroid, Levoxyl) 88 mcg tablet Take 1 tablet (88 mcg) by mouth once daily in the morning. Take before meals.      methocarbamol (Robaxin) 500 mg tablet Take 1 tablet (500 mg) by mouth 2 times a day as needed for muscle spasms. 40 tablet 0    multivitamin tablet Take 1 tablet by mouth once daily.      olopatadine (Patanol) 0.1 % ophthalmic solution Administer 1 drop into both eyes 2 times a day.      tamsulosin (Flomax) 0.4 mg 24 hr capsule Take 1 capsule (0.4 mg) by mouth once daily. 90 capsule 2    torsemide (Demadex) 20 mg tablet Take 1 tablet (20 mg) by mouth once daily as needed (Please take 20mg torsemide daily if weight increases >2 lbs over a two day period. Please contact cardiologist at those times for further instructions.). 90 tablet 1     No current facility-administered medications for this visit.        Active Problems:  Brady Ortega is a 79 y.o. male with the following Problems and  Medications.  Patient Active Problem List   Diagnosis    Physical deconditioning    Cellulitis of right leg    Urine retention    Dysuria    Acute kidney injury superimposed on CKD (CMS-HCC)    Hyponatremia    Anemia    Atrial fibrillation (Multi)    Parkinson's disease without dyskinesia or fluctuating manifestations (Multi)    Heart failure with preserved ejection fraction (Multi)    Neuropathy    Chronic back pain    Hypothyroidism    Hyperparathyroidism (Multi)    Muscle spasms of neck    Anasarca    Other superficial bite of lip, initial encounter    Discharge planning issues    Shock (Multi)    Sepsis with acute organ dysfunction and septic shock (Multi)    Wild-type transthyretin-related (ATTR) amyloidosis (Multi)    Goals of care, counseling/discussion    Hypophosphatemia     Current Outpatient Medications   Medication Sig Dispense Refill    apixaban (Eliquis) 2.5 mg tablet Take 1 tablet (2.5 mg) by mouth 2 times a day.      atorvastatin (Lipitor) 20 mg tablet Take 1 tablet (20 mg) by mouth once daily.      azelastine (Astelin) 137 mcg (0.1 %) nasal spray Administer 2 sprays into each nostril 2 times a day. Use in each nostril as directed      carbidopa-levodopa (Sinemet)  mg tablet take 2 tablets by mouth daily AT 7AM and AT 11AM and take 2 and 1/2 tablets daily AT 3PM and AT 7PM      cholecalciferol (Vitamin D-3) 25 MCG (1000 UT) capsule Take 1 capsule (25 mcg) by mouth once daily.      coenzyme Q-10 100 mg capsule Take 2 capsules (200 mg) by mouth once daily.      diclofenac sodium (Voltaren) 1 % gel Apply 4.5 inches (4 g) topically 2 times a day as needed. To lower back      famotidine (Pepcid) 20 mg tablet Take by mouth once daily at bedtime.      glucosamine-chondroit-vit C-Mn 500-400 mg capsule Take 2 capsules by mouth once daily.      ipratropium (Atrovent) 21 mcg (0.03 %) nasal spray Administer 2 sprays into each nostril 2 times a day.      levothyroxine (Synthroid, Levoxyl) 88 mcg tablet Take  1 tablet (88 mcg) by mouth once daily in the morning. Take before meals.      methocarbamol (Robaxin) 500 mg tablet Take 1 tablet (500 mg) by mouth 2 times a day as needed for muscle spasms. 40 tablet 0    multivitamin tablet Take 1 tablet by mouth once daily.      olopatadine (Patanol) 0.1 % ophthalmic solution Administer 1 drop into both eyes 2 times a day.      tamsulosin (Flomax) 0.4 mg 24 hr capsule Take 1 capsule (0.4 mg) by mouth once daily. 90 capsule 2    torsemide (Demadex) 20 mg tablet Take 1 tablet (20 mg) by mouth once daily as needed (Please take 20mg torsemide daily if weight increases >2 lbs over a two day period. Please contact cardiologist at those times for further instructions.). 90 tablet 1     No current facility-administered medications for this visit.       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    Hordeolum internum left lower eyelid 12/04/2015    Hordeolum internum of left lower eyelid    Noninfective gastroenteritis and colitis, unspecified     Colitis    Other bursitis of knee, unspecified knee     Knee bursitis    Personal history of other diseases of the respiratory system     History of asthma    Personal history of other endocrine, nutritional and metabolic disease     History of hyperlipidemia       PSH:  Past Surgical History:   Procedure Laterality Date    BACK SURGERY  11/15/2013    Back Surgery    HERNIA REPAIR  11/15/2013    Hernia Repair    LUNG LOBECTOMY  01/13/2017    Lung Lobectomy    OTHER SURGICAL HISTORY  01/13/2017    Wedge Resection Of Lung    TOTAL HIP ARTHROPLASTY  11/15/2013    Hip Replacement    VASECTOMY  11/15/2013    Surgery Vas Deferens Vasectomy       FMH:  No family history on file.    SHx:  Social History     Tobacco Use    Smoking status: Former     Current  packs/day: 0.00     Types: Cigarettes     Quit date:      Years since quittin.3     Passive exposure: Past    Smokeless tobacco: Never   Substance Use Topics    Alcohol use: Never    Drug use: Never       Allergies:  Allergies   Allergen Reactions    Pollen Extracts Unknown     Sneezing,itching,watery eyes    Bactrim [Sulfamethoxazole-Trimethoprim] Unknown    Risedronate Unknown     Bone pain    Sulfa (Sulfonamide Antibiotics) Unknown     Assessment/Plan  Emptying adequately.  Still with some obstructive symptoms, but again given that he is emptying well do not see a reason to move to surgery at this time, particularly in light of his other comorbidities.  Will start on finasteride 5 mg daily to maximize his medical therapy and reduce his risk of recurrent retention.  His dysuria is likely from the catheter, it is getting better on its own.  If things get worse he will let me know.  Otherwise we will see in 3 months for PVR, sooner as needed.      Scribe Attestation  By signing my name below, I, Jigar Joseph, attest that this documentation  has been prepared under the direction and in the presence of Lino Berry MD.

## 2024-04-22 DIAGNOSIS — N40.1 BENIGN PROSTATIC HYPERPLASIA WITH URINARY OBSTRUCTION: ICD-10-CM

## 2024-04-22 DIAGNOSIS — N13.8 BENIGN PROSTATIC HYPERPLASIA WITH URINARY OBSTRUCTION: ICD-10-CM

## 2024-04-22 RX ORDER — FINASTERIDE 5 MG/1
5 TABLET, FILM COATED ORAL DAILY
Qty: 90 TABLET | Refills: 3 | Status: SHIPPED | OUTPATIENT
Start: 2024-04-22 | End: 2025-04-22

## 2024-04-30 ENCOUNTER — OFFICE VISIT (OUTPATIENT)
Dept: ORTHOPEDIC SURGERY | Facility: CLINIC | Age: 79
End: 2024-04-30
Payer: COMMERCIAL

## 2024-04-30 VITALS — BODY MASS INDEX: 27.64 KG/M2 | WEIGHT: 156 LBS | HEIGHT: 63 IN

## 2024-04-30 DIAGNOSIS — G56.03 CARPAL TUNNEL SYNDROME, BILATERAL: Primary | ICD-10-CM

## 2024-04-30 PROCEDURE — 20526 THER INJECTION CARP TUNNEL: CPT | Performed by: ORTHOPAEDIC SURGERY

## 2024-04-30 PROCEDURE — 1157F ADVNC CARE PLAN IN RCRD: CPT | Performed by: ORTHOPAEDIC SURGERY

## 2024-04-30 PROCEDURE — 2500000004 HC RX 250 GENERAL PHARMACY W/ HCPCS (ALT 636 FOR OP/ED): Performed by: ORTHOPAEDIC SURGERY

## 2024-04-30 PROCEDURE — 99203 OFFICE O/P NEW LOW 30 MIN: CPT | Performed by: ORTHOPAEDIC SURGERY

## 2024-04-30 PROCEDURE — 2500000005 HC RX 250 GENERAL PHARMACY W/O HCPCS: Performed by: ORTHOPAEDIC SURGERY

## 2024-04-30 PROCEDURE — 99213 OFFICE O/P EST LOW 20 MIN: CPT | Performed by: ORTHOPAEDIC SURGERY

## 2024-04-30 PROCEDURE — 1159F MED LIST DOCD IN RCRD: CPT | Performed by: ORTHOPAEDIC SURGERY

## 2024-04-30 PROCEDURE — 1036F TOBACCO NON-USER: CPT | Performed by: ORTHOPAEDIC SURGERY

## 2024-04-30 RX ORDER — TRIAMCINOLONE ACETONIDE 40 MG/ML
20 INJECTION, SUSPENSION INTRA-ARTICULAR; INTRAMUSCULAR
Status: COMPLETED | OUTPATIENT
Start: 2024-04-30 | End: 2024-04-30

## 2024-04-30 RX ORDER — LIDOCAINE HYDROCHLORIDE 10 MG/ML
0.5 INJECTION INFILTRATION; PERINEURAL
Status: COMPLETED | OUTPATIENT
Start: 2024-04-30 | End: 2024-04-30

## 2024-04-30 RX ADMIN — LIDOCAINE HYDROCHLORIDE 0.5 ML: 10 INJECTION, SOLUTION INFILTRATION; PERINEURAL at 12:32

## 2024-04-30 RX ADMIN — TRIAMCINOLONE ACETONIDE 20 MG: 40 INJECTION, SUSPENSION INTRA-ARTICULAR; INTRAMUSCULAR at 12:32

## 2024-04-30 ASSESSMENT — PAIN SCALES - GENERAL: PAINLEVEL_OUTOF10: 10 - WORST POSSIBLE PAIN

## 2024-04-30 ASSESSMENT — PAIN - FUNCTIONAL ASSESSMENT: PAIN_FUNCTIONAL_ASSESSMENT: 0-10

## 2024-04-30 NOTE — PROGRESS NOTES
The patient is a 79-year-old right-hand-dominant male presenting today for evaluation of bilateral carpal tunnel  He has significant medical comorbidities and was recently hospitalized with sepsis of unknown origin. He presents today for evaluation of bilateral hand numbness. He believes that his numbness type symptoms began late last fall and they are most bothersome at night time while sleeping. Both hands are equally involved. He describes a positive shake sign. He describes numbness in the thumb, index and middle finger bilaterally. Slight numbness in the ring finger and normal subjective sensation in his small fingers. He has tried some bracing options, which have not resulted in any significant improvement of his symptoms. He denies a history of diabetes       Please refer to New Patient Intake Form scanned into patient's electronic record for self-reported past medical history, past surgical history, medications, allergies, family history, social history and 10 point review of systems.        Examination:   Constitutional: Oriented to person, place, and time.   Appears well-developed and well-nourished.   Head: Normocephalic and atraumatic.   Eyes: Pupils are equal, round, and reactive to light.   Cardiovascular: Intact distal pulses.   Pulmonary/Chest/Breast: Effort normal. No respiratory distress.   Neurological: Alert and oriented to person, place, and time.   Skin: Skin is warm and dry.   Psychiatric: normal mood and affect. Behavior is normal.   Musculoskeletal: Examination of his bilateral hands reveals advanced degenerative changes. He has significant arthritic changes involving all of his finger IP joints. He has marked atrophy of his thenar muscles. He also appears to have some atrophy of the ulnar nerve innervated muscles as well. He is able to demonstrate functional digital flexion and does not appear to have any evidence of triggering. He has positive Tinel's sign bilaterally, with significant  numbness to the thumb, index, and middle fingers bilaterally.      No new images were obtained.         Impression: Severe chronic bilateral carpal tunnel syndrome        Plan: We have discussed steroid injections as a way to potentially improve his symptoms, particularly at night time. The response to the injection can confirm the diagnosis and can predict a good response to surgery. Due to his ongoing medical issues, he is not interested in surgery at this time, but may consider it in the future if the injections are helpful. If he were to proceed with surgery, he indicates he would like to do this under local anesthesia to minimize any potential risks related to his heart and kidney disease. He was given bilateral carpal tunnel injections today. Will follow up as needed.    Hand / UE Inj/Asp: bilateral carpal tunnel for carpal tunnel syndrome on 4/30/2024 12:32 PM  Indications: pain and therapeutic  Details: 25 G needle, volar approach  Medications (Right): 20 mg triamcinolone acetonide 40 mg/mL; 0.5 mL lidocaine 10 mg/mL (1 %)  Medications (Left): 20 mg triamcinolone acetonide 40 mg/mL; 0.5 mL lidocaine 10 mg/mL (1 %)  Outcome: tolerated well, no immediate complications  Procedure, treatment alternatives, risks and benefits explained, specific risks discussed. Consent was given by the patient. Immediately prior to procedure a time out was called to verify the correct patient, procedure, equipment, support staff and site/side marked as required. Patient was prepped and draped in the usual sterile fashion.                Heath Betancur MD      [facility] [employer]   Department of Orthopaedic Surgery   Chief of Hand and Upper Extremity Surgery   [facility]       Scribe Attestation  By signing my name below, JES, Jigar Joshua,   attest that this documentation has been prepared under the direction and in the presence of  Heath Betancur MD.

## 2024-04-30 NOTE — LETTER
April 30, 2024     Oh Crane, APRN-CNP  [address]  [place] [address]    Patient: Brady Ortega   YOB: 1945   Date of Visit: 4/30/2024       Dear Dr. Oh Crane, APRN-CNP:    Thank you for referring Brady Ortega to me for evaluation. Below are my notes for this consultation.  If you have questions, please do not hesitate to call me. I look forward to following your patient along with you.       Sincerely,     Heath Betancur MD      CC: No Recipients  ______________________________________________________________________________________    The patient is a 79-year-old right-hand-dominant male presenting today for evaluation of bilateral carpal tunnel  He has significant medical comorbidities and was recently hospitalized with sepsis of unknown origin. He presents today for evaluation of bilateral hand numbness. He believes that his numbness type symptoms began late last fall and they are most bothersome at night time while sleeping. Both hands are equally involved. He describes a positive shake sign. He describes numbness in the thumb, index and middle finger bilaterally. Slight numbness in the ring finger and normal subjective sensation in his small fingers. He has tried some bracing options, which have not resulted in any significant improvement of his symptoms. He denies a history of diabetes       Please refer to New Patient Intake Form scanned into patient's electronic record for self-reported past medical history, past surgical history, medications, allergies, family history, social history and 10 point review of systems.        Examination:   Constitutional: Oriented to person, place, and time.   Appears well-developed and well-nourished.   Head: Normocephalic and atraumatic.   Eyes: Pupils are equal, round, and reactive to light.   Cardiovascular: Intact distal pulses.   Pulmonary/Chest/Breast: Effort normal. No respiratory distress.   Neurological: Alert  and oriented to person, place, and time.   Skin: Skin is warm and dry.   Psychiatric: normal mood and affect. Behavior is normal.   Musculoskeletal: Examination of his bilateral hands reveals advanced degenerative changes. He has significant arthritic changes involving all of his finger IP joints. He has marked atrophy of his thenar muscles. He also appears to have some atrophy of the ulnar nerve innervated muscles as well. He is able to demonstrate functional digital flexion and does not appear to have any evidence of triggering. He has positive Tinel's sign bilaterally, with significant numbness to the thumb, index, and middle fingers bilaterally.      No new images were obtained.         Impression: Severe chronic bilateral carpal tunnel syndrome        Plan: We have discussed steroid injections as a way to potentially improve his symptoms, particularly at night time. The response to the injection can confirm the diagnosis and can predict a good response to surgery. Due to his ongoing medical issues, he is not interested in surgery at this time, but may consider it in the future if the injections are helpful. If he were to proceed with surgery, he indicates he would like to do this under local anesthesia to minimize any potential risks related to his heart and kidney disease. He was given bilateral carpal tunnel injections today. Will follow up as needed.    Hand / UE Inj/Asp: bilateral carpal tunnel for carpal tunnel syndrome on 4/30/2024 12:32 PM  Indications: pain and therapeutic  Details: 25 G needle, volar approach  Medications (Right): 20 mg triamcinolone acetonide 40 mg/mL; 0.5 mL lidocaine 10 mg/mL (1 %)  Medications (Left): 20 mg triamcinolone acetonide 40 mg/mL; 0.5 mL lidocaine 10 mg/mL (1 %)  Outcome: tolerated well, no immediate complications  Procedure, treatment alternatives, risks and benefits explained, specific risks discussed. Consent was given by the patient. Immediately prior to procedure a  time out was called to verify the correct patient, procedure, equipment, support staff and site/side marked as required. Patient was prepped and draped in the usual sterile fashion.                Heath Betancur MD      [facility] [employer]   Department of Orthopaedic Surgery   Chief of Hand and Upper Extremity Surgery   [facility]       Scribe Attestation  By signing my name below, I, Jigar Joshua,   attest that this documentation has been prepared under the direction and in the presence of Heath Betancur MD.

## 2024-05-08 ENCOUNTER — APPOINTMENT (OUTPATIENT)
Dept: UROLOGY | Facility: HOSPITAL | Age: 79
End: 2024-05-08
Payer: COMMERCIAL

## 2024-05-23 ENCOUNTER — APPOINTMENT (OUTPATIENT)
Dept: UROLOGY | Facility: HOSPITAL | Age: 79
End: 2024-05-23
Payer: COMMERCIAL

## 2024-07-03 ENCOUNTER — LAB (OUTPATIENT)
Dept: LAB | Facility: LAB | Age: 79
End: 2024-07-03
Payer: COMMERCIAL

## 2024-07-03 DIAGNOSIS — R39.9 UTI SYMPTOMS: ICD-10-CM

## 2024-07-03 DIAGNOSIS — R39.9 UTI SYMPTOMS: Primary | ICD-10-CM

## 2024-07-03 LAB
APPEARANCE UR: ABNORMAL
BACTERIA #/AREA URNS AUTO: ABNORMAL /HPF
BILIRUB UR STRIP.AUTO-MCNC: NEGATIVE MG/DL
COLOR UR: YELLOW
GLUCOSE UR STRIP.AUTO-MCNC: NORMAL MG/DL
KETONES UR STRIP.AUTO-MCNC: NEGATIVE MG/DL
LEUKOCYTE ESTERASE UR QL STRIP.AUTO: ABNORMAL
NITRITE UR QL STRIP.AUTO: NEGATIVE
PH UR STRIP.AUTO: 5.5 [PH]
PROT UR STRIP.AUTO-MCNC: ABNORMAL MG/DL
RBC # UR STRIP.AUTO: ABNORMAL /UL
RBC #/AREA URNS AUTO: >20 /HPF
SP GR UR STRIP.AUTO: 1.01
UROBILINOGEN UR STRIP.AUTO-MCNC: NORMAL MG/DL
WBC #/AREA URNS AUTO: >50 /HPF
WBC CLUMPS #/AREA URNS AUTO: ABNORMAL /HPF

## 2024-07-03 PROCEDURE — 87186 SC STD MICRODIL/AGAR DIL: CPT

## 2024-07-03 PROCEDURE — 81001 URINALYSIS AUTO W/SCOPE: CPT

## 2024-07-03 PROCEDURE — 87086 URINE CULTURE/COLONY COUNT: CPT

## 2024-07-03 NOTE — PROGRESS NOTES
Patient called with UTI symptom UCX order  placed. Patient is aware and will completed this today.

## 2024-07-05 DIAGNOSIS — R39.9 UTI SYMPTOMS: ICD-10-CM

## 2024-07-05 RX ORDER — CIPROFLOXACIN 500 MG/1
500 TABLET ORAL 2 TIMES DAILY
Qty: 14 TABLET | Refills: 0 | Status: SHIPPED | OUTPATIENT
Start: 2024-07-05 | End: 2024-07-12

## 2024-07-06 LAB — BACTERIA UR CULT: ABNORMAL

## 2024-07-19 ENCOUNTER — OFFICE VISIT (OUTPATIENT)
Dept: UROLOGY | Facility: HOSPITAL | Age: 79
End: 2024-07-19
Payer: COMMERCIAL

## 2024-07-19 DIAGNOSIS — Z87.440 PERSONAL HISTORY UTI: Primary | ICD-10-CM

## 2024-07-19 DIAGNOSIS — N40.1 BENIGN PROSTATIC HYPERPLASIA WITH INCOMPLETE BLADDER EMPTYING: ICD-10-CM

## 2024-07-19 DIAGNOSIS — R39.14 BENIGN PROSTATIC HYPERPLASIA WITH INCOMPLETE BLADDER EMPTYING: ICD-10-CM

## 2024-07-19 LAB
POC APPEARANCE, URINE: CLEAR
POC BILIRUBIN, URINE: NEGATIVE
POC BLOOD, URINE: NEGATIVE
POC COLOR, URINE: YELLOW
POC GLUCOSE, URINE: NEGATIVE MG/DL
POC KETONES, URINE: NEGATIVE MG/DL
POC LEUKOCYTES, URINE: ABNORMAL
POC NITRITE,URINE: NEGATIVE
POC PH, URINE: 5.5 PH
POC PROTEIN, URINE: NEGATIVE MG/DL
POC SPECIFIC GRAVITY, URINE: 1.01
POC UROBILINOGEN, URINE: 0.2 EU/DL

## 2024-07-19 PROCEDURE — 1157F ADVNC CARE PLAN IN RCRD: CPT | Performed by: UROLOGY

## 2024-07-19 PROCEDURE — 99214 OFFICE O/P EST MOD 30 MIN: CPT | Performed by: UROLOGY

## 2024-07-19 PROCEDURE — 51798 US URINE CAPACITY MEASURE: CPT | Performed by: UROLOGY

## 2024-07-19 PROCEDURE — 81003 URINALYSIS AUTO W/O SCOPE: CPT | Performed by: UROLOGY

## 2024-07-19 PROCEDURE — G2211 COMPLEX E/M VISIT ADD ON: HCPCS | Performed by: UROLOGY

## 2024-07-19 NOTE — PROGRESS NOTES
HPI  79 y.o. male being seen with the following problem list:    Problem list:  1. BEKA  2. Urinary retention  3. Linda's    Per Dr. Hankins 3/7/24:  Pt was recently hospitalized on 2/16/23 for cellulitis and sepsis also with acute kidney injury. He was placed on Tamsulosin recently and I explained it can take a few weeks for medication to fully begin to working. Denny was removed this morning and was unable to urinate even with an urge. Pt has been straight cath twice. He was able to urinate prior to leaving the hospital but now he is unable to do so. Pt declined placing a new catheter. Also, offered low dose Cialis 5mg to help with urination. Will try daily dosing Cialis. Pt denies nitrates, explained. Will set follow up for Dr. Berry in 2 months, pt agrees with plan.  ___________________________________________________________________________________________  4/11/24 - 79 y.o.  M seen today for consult due to hx of BEKA, retention requiring catheterization to void. Currently taking Cialis 5mg po qd to help with urination. Reports that he has intermittently been able to void well prior to BEKA. Interested in surgery at this time to hopefully alleviate full time need for catheterization.    TOV performed, passed 100%.    LUTS  Urinary symptoms include: Retention , UTIs, and unhappy with his urinary symptoms    CT C/A/P 3/19/24:  Stable hyperdense cyst in the right kidney.  Nonobstructing 4 mm stone in the left kidney.    RBUS 2/20/24:  -IMPRESSION:   NO HYDRONEPHROSIS.   -Nonobstructing LEFT nephrolithiasis.   -Multiple bilateral renal cysts, some minimally complex.   -1.2 cm echogenic indeterminate RIGHT interpolar renal lesion corresponds   to above fluid attenuation lesion on prior CT and is small to   characterize may represent a hyperdense cyst or small renal neoplasm.     4/19/24 - seen today for 2 week PVR. PVR 40cc.  Stream is on the weaker side.  Having some dysuria since catheter was removed, getting better.  Will start finasteride 5mg po qd.    7/19/24 - seen today for PVR + 3mo symptom check after starting finasteride 5mg. PVR 71cc. Reports recent infection with dysuria, persistent sensation to void. Symptoms have resolved as of now after a course of Cipro. Overall doing well.       Current Medications:  Current Outpatient Medications   Medication Sig Dispense Refill    apixaban (Eliquis) 2.5 mg tablet Take 1 tablet (2.5 mg) by mouth 2 times a day.      atorvastatin (Lipitor) 20 mg tablet Take 1 tablet (20 mg) by mouth once daily.      azelastine (Astelin) 137 mcg (0.1 %) nasal spray Administer 2 sprays into each nostril 2 times a day. Use in each nostril as directed      carbidopa-levodopa (Sinemet)  mg tablet take 2 tablets by mouth daily AT 7AM and AT 11AM and take 2 and 1/2 tablets daily AT 3PM and AT 7PM      cholecalciferol (Vitamin D-3) 25 MCG (1000 UT) capsule Take 1 capsule (25 mcg) by mouth once daily.      coenzyme Q-10 100 mg capsule Take 2 capsules (200 mg) by mouth once daily.      diclofenac sodium (Voltaren) 1 % gel Apply 4.5 inches (4 g) topically 2 times a day as needed. To lower back      famotidine (Pepcid) 20 mg tablet Take by mouth once daily at bedtime.      finasteride (Proscar) 5 mg tablet Take 1 tablet (5 mg) by mouth once daily. Do not crush, chew, or split. 90 tablet 3    glucosamine-chondroit-vit C-Mn 500-400 mg capsule Take 2 capsules by mouth once daily.      ipratropium (Atrovent) 21 mcg (0.03 %) nasal spray Administer 2 sprays into each nostril 2 times a day.      levothyroxine (Synthroid, Levoxyl) 88 mcg tablet Take 1 tablet (88 mcg) by mouth once daily in the morning. Take before meals.      multivitamin tablet Take 1 tablet by mouth once daily.      olopatadine (Patanol) 0.1 % ophthalmic solution Administer 1 drop into both eyes 2 times a day.      tamsulosin (Flomax) 0.4 mg 24 hr capsule Take 1 capsule (0.4 mg) by mouth once daily. 90 capsule 2    torsemide (Demadex) 20 mg  tablet Take 1 tablet (20 mg) by mouth once daily as needed (Please take 20mg torsemide daily if weight increases >2 lbs over a two day period. Please contact cardiologist at those times for further instructions.). 90 tablet 1     No current facility-administered medications for this visit.        Active Problems:  Brady Ortega is a 79 y.o. male with the following Problems and Medications.  Patient Active Problem List   Diagnosis    Physical deconditioning    Cellulitis of right leg    Urine retention    Dysuria    Acute kidney injury superimposed on CKD (CMS-HCC)    Hyponatremia    Anemia    Atrial fibrillation (Multi)    Parkinson's disease without dyskinesia or fluctuating manifestations (Multi)    Heart failure with preserved ejection fraction (Multi)    Neuropathy    Chronic back pain    Hypothyroidism    Hyperparathyroidism (Multi)    Muscle spasms of neck    Anasarca    Other superficial bite of lip, initial encounter    Discharge planning issues    Shock (Multi)    Sepsis with acute organ dysfunction and septic shock (Multi)    Wild-type transthyretin-related (ATTR) amyloidosis (Multi)    Goals of care, counseling/discussion    Hypophosphatemia    Weak urinary stream    Benign prostatic hyperplasia with urinary obstruction     Current Outpatient Medications   Medication Sig Dispense Refill    apixaban (Eliquis) 2.5 mg tablet Take 1 tablet (2.5 mg) by mouth 2 times a day.      atorvastatin (Lipitor) 20 mg tablet Take 1 tablet (20 mg) by mouth once daily.      azelastine (Astelin) 137 mcg (0.1 %) nasal spray Administer 2 sprays into each nostril 2 times a day. Use in each nostril as directed      carbidopa-levodopa (Sinemet)  mg tablet take 2 tablets by mouth daily AT 7AM and AT 11AM and take 2 and 1/2 tablets daily AT 3PM and AT 7PM      cholecalciferol (Vitamin D-3) 25 MCG (1000 UT) capsule Take 1 capsule (25 mcg) by mouth once daily.      coenzyme Q-10 100 mg capsule Take 2 capsules (200 mg) by  mouth once daily.      diclofenac sodium (Voltaren) 1 % gel Apply 4.5 inches (4 g) topically 2 times a day as needed. To lower back      famotidine (Pepcid) 20 mg tablet Take by mouth once daily at bedtime.      finasteride (Proscar) 5 mg tablet Take 1 tablet (5 mg) by mouth once daily. Do not crush, chew, or split. 90 tablet 3    glucosamine-chondroit-vit C-Mn 500-400 mg capsule Take 2 capsules by mouth once daily.      ipratropium (Atrovent) 21 mcg (0.03 %) nasal spray Administer 2 sprays into each nostril 2 times a day.      levothyroxine (Synthroid, Levoxyl) 88 mcg tablet Take 1 tablet (88 mcg) by mouth once daily in the morning. Take before meals.      multivitamin tablet Take 1 tablet by mouth once daily.      olopatadine (Patanol) 0.1 % ophthalmic solution Administer 1 drop into both eyes 2 times a day.      tamsulosin (Flomax) 0.4 mg 24 hr capsule Take 1 capsule (0.4 mg) by mouth once daily. 90 capsule 2    torsemide (Demadex) 20 mg tablet Take 1 tablet (20 mg) by mouth once daily as needed (Please take 20mg torsemide daily if weight increases >2 lbs over a two day period. Please contact cardiologist at those times for further instructions.). 90 tablet 1     No current facility-administered medications for this visit.       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    Hordeolum internum left lower eyelid 12/04/2015    Hordeolum internum of left lower eyelid    Noninfective gastroenteritis and colitis, unspecified     Colitis    Other bursitis of knee, unspecified knee     Knee bursitis    Personal history of other diseases of the respiratory system     History of asthma    Personal history of other endocrine, nutritional and metabolic disease     History of hyperlipidemia       PSH:  Past Surgical History:    Procedure Laterality Date    BACK SURGERY  11/15/2013    Back Surgery    HERNIA REPAIR  11/15/2013    Hernia Repair    LUNG LOBECTOMY  2017    Lung Lobectomy    OTHER SURGICAL HISTORY  2017    Wedge Resection Of Lung    TOTAL HIP ARTHROPLASTY  11/15/2013    Hip Replacement    VASECTOMY  11/15/2013    Surgery Vas Deferens Vasectomy       FMH:  No family history on file.    SHx:  Social History     Tobacco Use    Smoking status: Former     Current packs/day: 0.00     Types: Cigarettes     Quit date: 1965     Years since quittin.5     Passive exposure: Past    Smokeless tobacco: Never   Substance Use Topics    Alcohol use: Never    Drug use: Never       Allergies:  Allergies   Allergen Reactions    Pollen Extracts Unknown     Sneezing,itching,watery eyes    Bactrim [Sulfamethoxazole-Trimethoprim] Unknown    Risedronate Unknown     Bone pain    Sulfa (Sulfonamide Antibiotics) Unknown     Assessment/Plan  Recent infection with dysuria and sensation to void. Symptoms have since resolved after course of cipro. Discussed moving to surgery vs continuing with maximal medical therapy. They only want surgery as a last resort. Discussed that since he is not interested in surgery at this time, we should give the finasteride more time to reach full effect. Discussed that finasteride typically takes 6+ months to work properly. Will give a few more months and follow up after for a symptom check. If he has worsening infections or other problems, would revisit surgery.    Follow up in 3 months for a symptom check + PVR.      Scribe Attestation  By signing my name below, I, Jigar Joseph, attest that this documentation  has been prepared under the direction and in the presence of Lino Berry MD.

## 2024-08-22 ENCOUNTER — APPOINTMENT (OUTPATIENT)
Dept: ORTHOPEDIC SURGERY | Facility: CLINIC | Age: 79
End: 2024-08-22
Payer: COMMERCIAL

## 2024-08-22 ENCOUNTER — OFFICE VISIT (OUTPATIENT)
Dept: ORTHOPEDIC SURGERY | Facility: CLINIC | Age: 79
End: 2024-08-22
Payer: COMMERCIAL

## 2024-08-22 VITALS — HEIGHT: 66 IN | WEIGHT: 156 LBS | BODY MASS INDEX: 25.07 KG/M2

## 2024-08-22 DIAGNOSIS — G56.03 CARPAL TUNNEL SYNDROME, BILATERAL: Primary | ICD-10-CM

## 2024-08-22 PROCEDURE — 20526 THER INJECTION CARP TUNNEL: CPT | Mod: 50 | Performed by: PHYSICIAN ASSISTANT

## 2024-08-22 PROCEDURE — 99213 OFFICE O/P EST LOW 20 MIN: CPT | Performed by: PHYSICIAN ASSISTANT

## 2024-08-22 PROCEDURE — 2500000004 HC RX 250 GENERAL PHARMACY W/ HCPCS (ALT 636 FOR OP/ED): Performed by: PHYSICIAN ASSISTANT

## 2024-08-22 PROCEDURE — 1036F TOBACCO NON-USER: CPT | Performed by: PHYSICIAN ASSISTANT

## 2024-08-22 PROCEDURE — 1157F ADVNC CARE PLAN IN RCRD: CPT | Performed by: PHYSICIAN ASSISTANT

## 2024-08-22 PROCEDURE — 2500000005 HC RX 250 GENERAL PHARMACY W/O HCPCS: Performed by: PHYSICIAN ASSISTANT

## 2024-08-22 RX ORDER — TRIAMCINOLONE ACETONIDE 40 MG/ML
20 INJECTION, SUSPENSION INTRA-ARTICULAR; INTRAMUSCULAR
Status: COMPLETED | OUTPATIENT
Start: 2024-08-22 | End: 2024-08-22

## 2024-08-22 RX ORDER — LIDOCAINE HYDROCHLORIDE 10 MG/ML
0.5 INJECTION INFILTRATION; PERINEURAL
Status: COMPLETED | OUTPATIENT
Start: 2024-08-22 | End: 2024-08-22

## 2024-08-22 NOTE — PROGRESS NOTES
Patient returns to clinic today for follow-up of bilateral carpal tunnel syndrome.  Previously seen by Dr. Betancur back in April at that time received carpal tunnel steroid injections.  Did get some mild temporary relief following this injection.  Has a long history of comorbidities and severe carpal tunnel syndrome as well as severe arthritis.  Also complains of some mild right elbow pain.  States that he did previously get imaging however this was done at the [facility].  No known injury or trauma he is requesting repeat carpal tunnel steroid injections today as he is not interested in surgery at this time       Please refer to New Patient Intake Form scanned into patient's electronic record for self-reported past medical history, past surgical history, medications, allergies, family history, social history and 10 point review of systems.        Examination:   Constitutional: Oriented to person, place, and time.   Appears well-developed and well-nourished.   Head: Normocephalic and atraumatic.   Eyes: Pupils are equal, round, and reactive to light.   Cardiovascular: Intact distal pulses.   Pulmonary/Chest/Breast: Effort normal. No respiratory distress.   Neurological: Alert and oriented to person, place, and time.   Skin: Skin is warm and dry.   Psychiatric: normal mood and affect. Behavior is normal.   Musculoskeletal: Examination of his bilateral hands reveals advanced degenerative changes. He has significant arthritic changes involving all of his finger IP joints. He has marked atrophy of his thenar muscles. He also appears to have some atrophy of the ulnar nerve innervated muscles as well. He is able to demonstrate functional digital flexion and does not  have any evidence of triggering. He has positive Tinel's sign bilaterally, with significant numbness to the thumb, index, and middle fingers bilaterally.  Limited elbow flexion and extension.  Ambulation with a cane      Impression: Severe bilateral  carpal tunnel syndrome        Plan: Repeat carpal tunnel steroid injections were performed today and tolerated well.  At this time he is not interested in surgical intervention.  If he continues to have worsening elbow symptoms recommendation would be for further evaluation with an x-ray.  We discussed possibility of steroid injection for his elbow as well he will return to our office for recurrence or progression of symptoms.    Hand / UE Inj/Asp: bilateral carpal tunnel for carpal tunnel syndrome on 8/22/2024 12:49 PM  Indications: pain and therapeutic  Details: 25 G needle, volar approach  Medications (Right): 20 mg triamcinolone acetonide 40 mg/mL; 0.5 mL lidocaine 10 mg/mL (1 %)  Medications (Left): 20 mg triamcinolone acetonide 40 mg/mL; 0.5 mL lidocaine 10 mg/mL (1 %)  Outcome: tolerated well, no immediate complications  Procedure, treatment alternatives, risks and benefits explained, specific risks discussed. Consent was given by the patient. Immediately prior to procedure a time out was called to verify the correct patient, procedure, equipment, support staff and site/side marked as required. Patient was prepped and draped in the usual sterile fashion.         Gretchen Rasmussen PA-C  Department of Orthopaedic Surgery  [facility]    Dictation performed with the use of voice recognition software. Syntax and grammatical errors may exist.

## 2024-10-07 ENCOUNTER — TELEPHONE (OUTPATIENT)
Dept: UROLOGY | Facility: HOSPITAL | Age: 79
End: 2024-10-07
Payer: COMMERCIAL

## 2024-10-07 ENCOUNTER — LAB (OUTPATIENT)
Dept: LAB | Facility: LAB | Age: 79
End: 2024-10-07
Payer: COMMERCIAL

## 2024-10-07 DIAGNOSIS — R30.0 DYSURIA: ICD-10-CM

## 2024-10-07 PROCEDURE — 87086 URINE CULTURE/COLONY COUNT: CPT

## 2024-10-07 NOTE — TELEPHONE ENCOUNTER
Returned call to patient. Patient states he started having dysuria around 2 am. He initially was having issues with voiding but that has since improved and he is voiding without issues. Order for an urine culture were placed and an email was sent to Dr. Hankins and Dr. Berry for additional orders.    Radha Nicole RN

## 2024-10-08 RX ORDER — CIPROFLOXACIN 500 MG/1
500 TABLET ORAL 2 TIMES DAILY
Qty: 14 TABLET | Refills: 0 | Status: SHIPPED | OUTPATIENT
Start: 2024-10-08 | End: 2024-10-15

## 2024-10-09 LAB — BACTERIA UR CULT: NO GROWTH

## 2024-10-18 ENCOUNTER — OFFICE VISIT (OUTPATIENT)
Dept: UROLOGY | Facility: HOSPITAL | Age: 79
End: 2024-10-18
Payer: COMMERCIAL

## 2024-10-18 DIAGNOSIS — R39.9 LOWER URINARY TRACT SYMPTOMS (LUTS): ICD-10-CM

## 2024-10-18 DIAGNOSIS — N40.1 BENIGN PROSTATIC HYPERPLASIA WITH URINARY OBSTRUCTION: ICD-10-CM

## 2024-10-18 DIAGNOSIS — N40.0 BENIGN PROSTATIC HYPERPLASIA, UNSPECIFIED WHETHER LOWER URINARY TRACT SYMPTOMS PRESENT: Primary | ICD-10-CM

## 2024-10-18 DIAGNOSIS — N13.8 BENIGN PROSTATIC HYPERPLASIA WITH URINARY OBSTRUCTION: ICD-10-CM

## 2024-10-18 PROCEDURE — 1157F ADVNC CARE PLAN IN RCRD: CPT | Performed by: UROLOGY

## 2024-10-18 PROCEDURE — 99214 OFFICE O/P EST MOD 30 MIN: CPT | Performed by: UROLOGY

## 2024-10-18 PROCEDURE — G2211 COMPLEX E/M VISIT ADD ON: HCPCS | Performed by: UROLOGY

## 2024-10-18 RX ORDER — FINASTERIDE 5 MG/1
5 TABLET, FILM COATED ORAL DAILY
Qty: 90 TABLET | Refills: 3 | Status: SHIPPED | OUTPATIENT
Start: 2024-10-18 | End: 2025-10-18

## 2024-10-18 RX ORDER — TAMSULOSIN HYDROCHLORIDE 0.4 MG/1
0.4 CAPSULE ORAL DAILY
Qty: 90 CAPSULE | Refills: 3 | Status: SHIPPED | OUTPATIENT
Start: 2024-10-18 | End: 2025-10-18

## 2024-10-18 NOTE — PROGRESS NOTES
HPI    79 y.o. male being seen with the following problem list:    Problem list:  1 BEKA  2. Urinary retention  3. Linda's     Per Dr. Hankins 3/7/24:  Pt was recently hospitalized on 2/16/23 for cellulitis and sepsis also with acute kidney injury. He was placed on Tamsulosin recently and I explained it can take a few weeks for medication to fully begin to working. Denny was removed this morning and was unable to urinate even with an urge. Pt has been straight cath twice. He was able to urinate prior to leaving the hospital but now he is unable to do so. Pt declined placing a new catheter. Also, offered low dose Cialis 5mg to help with urination. Will try daily dosing Cialis. Pt denies nitrates, explained. Will set follow up for Dr. Berry in 2 months, pt agrees with plan.  ___________________________________________________________________________________________  4/11/24 - 79 y.o.  M seen today for consult due to hx of BEKA, retention requiring catheterization to void. Currently taking Cialis 5mg po qd to help with urination. Reports that he has intermittently been able to void well prior to BEKA. Interested in surgery at this time to hopefully alleviate full time need for catheterization.     TOV performed, passed 100%.     LUTS  Urinary symptoms include: Retention , UTIs, and unhappy with his urinary symptoms     CT C/A/P 3/19/24:  Stable hyperdense cyst in the right kidney.  Nonobstructing 4 mm stone in the left kidney.     RBUS 2/20/24:  -IMPRESSION:   NO HYDRONEPHROSIS.   -Nonobstructing LEFT nephrolithiasis.   -Multiple bilateral renal cysts, some minimally complex.   -1.2 cm echogenic indeterminate RIGHT interpolar renal lesion corresponds   to above fluid attenuation lesion on prior CT and is small to   characterize may represent a hyperdense cyst or small renal neoplasm.      4/19/24 - seen today for 2 week PVR. PVR 40cc.  Stream is on the weaker side.  Having some dysuria since catheter was removed, getting  better. Will start finasteride 5mg po qd.     7/19/24 - seen today for PVR + 3mo symptom check after starting finasteride 5mg. PVR 71cc. Reports recent infection with dysuria, persistent sensation to void. Symptoms have resolved as of now after a course of Cipro. Overall doing well.      Ucx 10/2024 - neg    10/18/24 - PVR today 115cc.  Doing well overall.  No infections.  He wakes up once a night to void.  Sometimes very rarely it is harder to get the stream going but this is not a common occurrence.  He was given a prescription for detrol by his neurologist he has not yet taken this.  He has problems going back to sleep after waking up to void.       Current Medications:  Current Outpatient Medications   Medication Sig Dispense Refill    apixaban (Eliquis) 2.5 mg tablet Take 1 tablet (2.5 mg) by mouth 2 times a day.      atorvastatin (Lipitor) 20 mg tablet Take 1 tablet (20 mg) by mouth once daily.      azelastine (Astelin) 137 mcg (0.1 %) nasal spray Administer 2 sprays into each nostril 2 times a day. Use in each nostril as directed      carbidopa-levodopa (Sinemet)  mg tablet take 2 tablets by mouth daily AT 7AM and AT 11AM and take 2 and 1/2 tablets daily AT 3PM and AT 7PM      cholecalciferol (Vitamin D-3) 25 MCG (1000 UT) capsule Take 1 capsule (25 mcg) by mouth once daily.      coenzyme Q-10 100 mg capsule Take 2 capsules (200 mg) by mouth once daily.      diclofenac sodium (Voltaren) 1 % gel Apply 4.5 inches (4 g) topically 2 times a day as needed. To lower back      famotidine (Pepcid) 20 mg tablet Take by mouth once daily at bedtime.      finasteride (Proscar) 5 mg tablet Take 1 tablet (5 mg) by mouth once daily. Do not crush, chew, or split. 90 tablet 3    glucosamine-chondroit-vit C-Mn 500-400 mg capsule Take 2 capsules by mouth once daily.      ipratropium (Atrovent) 21 mcg (0.03 %) nasal spray Administer 2 sprays into each nostril 2 times a day.      levothyroxine (Synthroid, Levoxyl) 88 mcg  tablet Take 1 tablet (88 mcg) by mouth once daily in the morning. Take before meals.      multivitamin tablet Take 1 tablet by mouth once daily.      olopatadine (Patanol) 0.1 % ophthalmic solution Administer 1 drop into both eyes 2 times a day.      tamsulosin (Flomax) 0.4 mg 24 hr capsule Take 1 capsule (0.4 mg) by mouth once daily. 90 capsule 2    torsemide (Demadex) 20 mg tablet Take 1 tablet (20 mg) by mouth once daily as needed (Please take 20mg torsemide daily if weight increases >2 lbs over a two day period. Please contact cardiologist at those times for further instructions.). 90 tablet 1     No current facility-administered medications for this visit.        Active Problems:  Brady Ortega is a 79 y.o. male with the following Problems and Medications.  Patient Active Problem List   Diagnosis    Physical deconditioning    Cellulitis of right leg    Urine retention    Dysuria    Acute kidney injury superimposed on CKD (CMS-HCC)    Hyponatremia    Anemia    Atrial fibrillation (Multi)    Parkinson's disease without dyskinesia or fluctuating manifestations    Heart failure with preserved ejection fraction    Neuropathy    Chronic back pain    Hypothyroidism    Hyperparathyroidism (Multi)    Muscle spasms of neck    Anasarca    Other superficial bite of lip, initial encounter    Discharge planning issues    Shock (Multi)    Sepsis with acute organ dysfunction and septic shock (Multi)    Wild-type transthyretin-related (ATTR) amyloidosis (Multi)    Goals of care, counseling/discussion    Hypophosphatemia    Weak urinary stream    Benign prostatic hyperplasia with incomplete bladder emptying    Personal history UTI    Carpal tunnel syndrome, bilateral     Current Outpatient Medications   Medication Sig Dispense Refill    apixaban (Eliquis) 2.5 mg tablet Take 1 tablet (2.5 mg) by mouth 2 times a day.      atorvastatin (Lipitor) 20 mg tablet Take 1 tablet (20 mg) by mouth once daily.      azelastine (Astelin)  137 mcg (0.1 %) nasal spray Administer 2 sprays into each nostril 2 times a day. Use in each nostril as directed      carbidopa-levodopa (Sinemet)  mg tablet take 2 tablets by mouth daily AT 7AM and AT 11AM and take 2 and 1/2 tablets daily AT 3PM and AT 7PM      cholecalciferol (Vitamin D-3) 25 MCG (1000 UT) capsule Take 1 capsule (25 mcg) by mouth once daily.      coenzyme Q-10 100 mg capsule Take 2 capsules (200 mg) by mouth once daily.      diclofenac sodium (Voltaren) 1 % gel Apply 4.5 inches (4 g) topically 2 times a day as needed. To lower back      famotidine (Pepcid) 20 mg tablet Take by mouth once daily at bedtime.      finasteride (Proscar) 5 mg tablet Take 1 tablet (5 mg) by mouth once daily. Do not crush, chew, or split. 90 tablet 3    glucosamine-chondroit-vit C-Mn 500-400 mg capsule Take 2 capsules by mouth once daily.      ipratropium (Atrovent) 21 mcg (0.03 %) nasal spray Administer 2 sprays into each nostril 2 times a day.      levothyroxine (Synthroid, Levoxyl) 88 mcg tablet Take 1 tablet (88 mcg) by mouth once daily in the morning. Take before meals.      multivitamin tablet Take 1 tablet by mouth once daily.      olopatadine (Patanol) 0.1 % ophthalmic solution Administer 1 drop into both eyes 2 times a day.      tamsulosin (Flomax) 0.4 mg 24 hr capsule Take 1 capsule (0.4 mg) by mouth once daily. 90 capsule 2    torsemide (Demadex) 20 mg tablet Take 1 tablet (20 mg) by mouth once daily as needed (Please take 20mg torsemide daily if weight increases >2 lbs over a two day period. Please contact cardiologist at those times for further instructions.). 90 tablet 1     No current facility-administered medications for this visit.       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    Hordeolum internum left  lower eyelid 2015    Hordeolum internum of left lower eyelid    Noninfective gastroenteritis and colitis, unspecified     Colitis    Other bursitis of knee, unspecified knee     Knee bursitis    Personal history of other diseases of the respiratory system     History of asthma    Personal history of other endocrine, nutritional and metabolic disease     History of hyperlipidemia       PSH:  Past Surgical History:   Procedure Laterality Date    BACK SURGERY  11/15/2013    Back Surgery    HERNIA REPAIR  11/15/2013    Hernia Repair    LUNG LOBECTOMY  2017    Lung Lobectomy    OTHER SURGICAL HISTORY  2017    Wedge Resection Of Lung    TOTAL HIP ARTHROPLASTY  11/15/2013    Hip Replacement    VASECTOMY  11/15/2013    Surgery Vas Deferens Vasectomy       FMH:  No family history on file.    SHx:  Social History     Tobacco Use    Smoking status: Former     Current packs/day: 0.00     Types: Cigarettes     Quit date:      Years since quittin.8     Passive exposure: Past    Smokeless tobacco: Never   Substance Use Topics    Alcohol use: Never    Drug use: Never       Allergies:  Allergies   Allergen Reactions    Pollen Extracts Unknown     Sneezing,itching,watery eyes    Bactrim [Sulfamethoxazole-Trimethoprim] Unknown    Risedronate Unknown     Bone pain    Sulfa (Sulfonamide Antibiotics) Unknown       Assessment/Plan  Emptying adequately on Flomax and finasteride, no bothersome symptoms, no infections.  He wakes up once a night to void.  Unfortunately has trouble getting back to sleep.  However once a night and even twice a night is normal for his age, I would not expose him to the risks of an anticholinergic for what is normal urinary function.    Continue Flomax and finasteride.  Refilled.  Follow-up in 6 months with PVR, sooner as needed.

## 2024-11-15 NOTE — PROGRESS NOTES
[facility]  Hand and Upper Extremity Service  Follow up visit         Follow up for: Bilateral hands     Interval History: He returns for his bilateral hands. He received bilateral carpal tunnel injections at last visit.               Past medical history, medications, allergies, surgical history and review of systems are reviewed and otherwise unchanged when compared to last visit on 4/30/24         Examination:  Constitutional: Oriented to person, place, and time.  Appears well-developed and well-nourished.  Head: Normocephalic and atraumatic.  Eyes: Pupils are equal, round, and reactive to light.  Cardiovascular: Intact distal pulses.  Pulmonary/Chest/Breast: Effort normal. No respiratory distress.  Neurological: Alert and oriented to person, place, and time.  Skin: Skin is warm and dry.  Psychiatric: normal mood and affect.  Behavior is normal.  Musculoskeletal: Bilateral hands reveal       Personal Interpretation of Diagnostic studies:        Impression: Severe chronic bilateral carpal tunnel syndrome       Plan:       In Office Procedures Performed:       Follow up:              Heath Betancur MD  [facility]  Department of Orthopaedic Surgery  Hand and Upper Extremity Reconstruction    Scribe Attestation  By signing my name below, IMabel , Scribe   attest that this documentation has been prepared under the direction and in the presence of Dr. Heath Betancur.    Dictation performed with the use of voice recognition software.  Syntax and grammatical errors may exist.

## 2024-11-21 ENCOUNTER — APPOINTMENT (OUTPATIENT)
Dept: ORTHOPEDIC SURGERY | Facility: CLINIC | Age: 79
End: 2024-11-21
Payer: COMMERCIAL

## 2024-11-24 NOTE — PROGRESS NOTES
[facility]  Hand and Upper Extremity Service  Follow up visit         Follow up for: Bilateral hands     Interval History: He returns for his bilateral hands. He has advanced carpal tunnel syndrome, Parkinson's, and gout. He received bilateral carpal tunnel injections at last visit and he did well but the numbness and tingling have returned along with sleep disturbances. Due to his Parkinson's he's unsteady on his feet and has had frequent falls. With some of his recent falls, he's aggravated his elbow arthritis and while this is improving it's still very bothersome and interferes with some of his activities.                Past medical history, medications, allergies, surgical history and review of systems are reviewed and otherwise unchanged when compared to last visit on 4/30/24         Examination:  Constitutional: Oriented to person, place, and time.  Appears well-developed and well-nourished.  Head: Normocephalic and atraumatic.  Eyes: Pupils are equal, round, and reactive to light.  Cardiovascular: Intact distal pulses.  Pulmonary/Chest/Breast: Effort normal. No respiratory distress.  Neurological: Alert and oriented to person, place, and time.  Skin: Skin is warm and dry.  Psychiatric: normal mood and affect.  Behavior is normal.  Musculoskeletal: Bilateral hands reveal advanced arthritic changes through wrist and hand. Atrophy of primarily intrinsic hand muscles. Gouty tophi over the dorsal DIP joint, most noticeably on the right index finger. Diminished sensation median nerve distribution. Elbows reveal mild swelling and bruising but no deformities. Maintains near full range of motion with mild pain. Tenderness over anconeus muscle.       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 He can follow up as needed.       In Office Procedures Performed: Bilateral elbow joint and bilateral carpal tunnel injections  Hand / UE Inj/Asp: bilateral carpal tunnel for carpal tunnel syndrome on 11/26/2024 1:58 PM  Indications: pain and therapeutic  Details: 25 G needle, volar approach  Medications (Right): 20 mg triamcinolone acetonide 40 mg/mL; 0.5 mL lidocaine 10 mg/mL (1 %)  Medications (Left): 20 mg triamcinolone acetonide 40 mg/mL; 0.5 mL lidocaine 10 mg/mL (1 %)  Outcome: tolerated well, no immediate complications  Procedure, treatment alternatives, risks and benefits explained, specific risks discussed. Consent was given by the patient. Immediately prior to procedure a time out was called to verify the correct patient, procedure, equipment, support staff and site/side marked as required. Patient was prepped and draped in the usual sterile fashion.       M Inj/Asp: bilateral elbow on 11/26/2024 1:58 PM  Indications: pain  Details: 25 G needle, lateral approach  Medications (Right): 0.5 mL lidocaine 10 mg/mL (1 %); 40 mg triamcinolone acetonide 40 mg/mL  Medications (Left): 0.5 mL lidocaine 10 mg/mL (1 %); 40 mg triamcinolone acetonide 40 mg/mL  Outcome: tolerated well, no immediate complications  Procedure, treatment alternatives, risks and benefits explained, specific risks discussed. Consent was given by the patient. Immediately prior to procedure a time out was called to verify the correct patient, procedure, equipment, support staff and site/side marked as required. Patient was prepped and draped in the usual sterile fashion.             Follow up: As needed              Heath Betancur MD  [facility]  Department of Orthopaedic Surgery  Hand and Upper Extremity Reconstruction    Scribe Attestation  By signing my name below, IMabel Scribe   attest that this documentation has been prepared under the direction and in the presence of Dr. Joe  Gypsy.    Dictation performed with the use of voice recognition software.  Syntax and grammatical errors may exist.

## 2024-11-26 ENCOUNTER — OFFICE VISIT (OUTPATIENT)
Dept: ORTHOPEDIC SURGERY | Facility: HOSPITAL | Age: 79
End: 2024-11-26
Payer: COMMERCIAL

## 2024-11-26 VITALS — HEIGHT: 66 IN | WEIGHT: 156 LBS | BODY MASS INDEX: 25.07 KG/M2

## 2024-11-26 DIAGNOSIS — G56.03 CARPAL TUNNEL SYNDROME, BILATERAL: ICD-10-CM

## 2024-11-26 DIAGNOSIS — M19.029 ELBOW ARTHRITIS: Primary | ICD-10-CM

## 2024-11-26 PROCEDURE — 2500000004 HC RX 250 GENERAL PHARMACY W/ HCPCS (ALT 636 FOR OP/ED): Performed by: ORTHOPAEDIC SURGERY

## 2024-11-26 PROCEDURE — 99213 OFFICE O/P EST LOW 20 MIN: CPT | Performed by: ORTHOPAEDIC SURGERY

## 2024-11-26 PROCEDURE — 1159F MED LIST DOCD IN RCRD: CPT | Performed by: ORTHOPAEDIC SURGERY

## 2024-11-26 PROCEDURE — 20605 DRAIN/INJ JOINT/BURSA W/O US: CPT | Mod: 50 | Performed by: ORTHOPAEDIC SURGERY

## 2024-11-26 PROCEDURE — 20526 THER INJECTION CARP TUNNEL: CPT | Mod: 50 | Performed by: ORTHOPAEDIC SURGERY

## 2024-11-26 PROCEDURE — 1157F ADVNC CARE PLAN IN RCRD: CPT | Performed by: ORTHOPAEDIC SURGERY

## 2024-11-26 PROCEDURE — 1036F TOBACCO NON-USER: CPT | Performed by: ORTHOPAEDIC SURGERY

## 2024-11-26 RX ORDER — TRIAMCINOLONE ACETONIDE 40 MG/ML
40 INJECTION, SUSPENSION INTRA-ARTICULAR; INTRAMUSCULAR
Status: COMPLETED | OUTPATIENT
Start: 2024-11-26 | End: 2024-11-26

## 2024-11-26 RX ORDER — LIDOCAINE HYDROCHLORIDE 10 MG/ML
0.5 INJECTION, SOLUTION INFILTRATION; PERINEURAL
Status: COMPLETED | OUTPATIENT
Start: 2024-11-26 | End: 2024-11-26

## 2024-11-26 RX ORDER — TRIAMCINOLONE ACETONIDE 40 MG/ML
20 INJECTION, SUSPENSION INTRA-ARTICULAR; INTRAMUSCULAR
Status: COMPLETED | OUTPATIENT
Start: 2024-11-26 | End: 2024-11-26

## 2024-11-26 ASSESSMENT — PAIN - FUNCTIONAL ASSESSMENT: PAIN_FUNCTIONAL_ASSESSMENT: 0-10

## 2024-11-26 ASSESSMENT — PAIN DESCRIPTION - DESCRIPTORS: DESCRIPTORS: ACHING;SORE

## 2024-11-26 ASSESSMENT — PAIN SCALES - GENERAL: PAINLEVEL_OUTOF10: 5 - MODERATE PAIN

## 2024-12-05 ENCOUNTER — OFFICE VISIT (OUTPATIENT)
Dept: UROLOGY | Facility: HOSPITAL | Age: 79
End: 2024-12-05
Payer: COMMERCIAL

## 2024-12-05 ENCOUNTER — LAB (OUTPATIENT)
Dept: LAB | Facility: LAB | Age: 79
End: 2024-12-05
Payer: COMMERCIAL

## 2024-12-05 DIAGNOSIS — R60.1 ANASARCA: ICD-10-CM

## 2024-12-05 DIAGNOSIS — N40.1 BENIGN PROSTATIC HYPERPLASIA WITH URINARY OBSTRUCTION: Primary | ICD-10-CM

## 2024-12-05 DIAGNOSIS — N13.8 BENIGN PROSTATIC HYPERPLASIA WITH URINARY OBSTRUCTION: Primary | ICD-10-CM

## 2024-12-05 LAB
ANION GAP SERPL CALC-SCNC: 12 MMOL/L (ref 10–20)
BNP SERPL-MCNC: 748 PG/ML (ref 0–99)
BUN SERPL-MCNC: 52 MG/DL (ref 6–23)
CALCIUM SERPL-MCNC: 9.2 MG/DL (ref 8.6–10.3)
CHLORIDE SERPL-SCNC: 104 MMOL/L (ref 98–107)
CO2 SERPL-SCNC: 26 MMOL/L (ref 21–32)
CREAT SERPL-MCNC: 1.65 MG/DL (ref 0.5–1.3)
EGFRCR SERPLBLD CKD-EPI 2021: 41 ML/MIN/1.73M*2
GLUCOSE SERPL-MCNC: 84 MG/DL (ref 74–99)
POTASSIUM SERPL-SCNC: 5.4 MMOL/L (ref 3.5–5.3)
SODIUM SERPL-SCNC: 137 MMOL/L (ref 136–145)

## 2024-12-05 PROCEDURE — 36415 COLL VENOUS BLD VENIPUNCTURE: CPT

## 2024-12-05 PROCEDURE — 99214 OFFICE O/P EST MOD 30 MIN: CPT | Performed by: UROLOGY

## 2024-12-05 PROCEDURE — 80048 BASIC METABOLIC PNL TOTAL CA: CPT

## 2024-12-05 PROCEDURE — 1157F ADVNC CARE PLAN IN RCRD: CPT | Performed by: UROLOGY

## 2024-12-05 PROCEDURE — G2211 COMPLEX E/M VISIT ADD ON: HCPCS | Performed by: UROLOGY

## 2024-12-05 PROCEDURE — 83880 ASSAY OF NATRIURETIC PEPTIDE: CPT

## 2024-12-05 NOTE — PROGRESS NOTES
HPI    79 y.o. male being seen with the following problem list:    Problem list:  BEKA  Urinary retention  Linda's     Per Dr. Hankins 3/7/24:  Pt was recently hospitalized on 2/16/23 for cellulitis and sepsis also with acute kidney injury. He was placed on Tamsulosin recently and I explained it can take a few weeks for medication to fully begin to working. Denny was removed this morning and was unable to urinate even with an urge. Pt has been straight cath twice. He was able to urinate prior to leaving the hospital but now he is unable to do so. Pt declined placing a new catheter. Also, offered low dose Cialis 5mg to help with urination. Will try daily dosing Cialis. Pt denies nitrates, explained. Will set follow up for Dr. Berry in 2 months, pt agrees with plan.  ___________________________________________________________________________________________  4/11/24 - 79 y.o.  M seen today for consult due to hx of BEKA, retention requiring catheterization to void. Currently taking Cialis 5mg po qd to help with urination. Reports that he has intermittently been able to void well prior to BEKA. Interested in surgery at this time to hopefully alleviate full time need for catheterization.     TOV performed, passed 100%.     LUTS  Urinary symptoms include: Retention , UTIs, and unhappy with his urinary symptoms     CT C/A/P 3/19/24:  Stable hyperdense cyst in the right kidney.  Nonobstructing 4 mm stone in the left kidney.     RBUS 2/20/24:  -IMPRESSION:   NO HYDRONEPHROSIS.   -Nonobstructing LEFT nephrolithiasis.   -Multiple bilateral renal cysts, some minimally complex.   -1.2 cm echogenic indeterminate RIGHT interpolar renal lesion corresponds   to above fluid attenuation lesion on prior CT and is small to   characterize may represent a hyperdense cyst or small renal neoplasm.      4/19/24 - seen today for 2 week PVR. PVR 40cc.  Stream is on the weaker side.  Having some dysuria since catheter was removed, getting better.  Will start finasteride 5mg po qd.     7/19/24 - seen today for PVR + 3mo symptom check after starting finasteride 5mg. PVR 71cc. Reports recent infection with dysuria, persistent sensation to void. Symptoms have resolved as of now after a course of Cipro. Overall doing well.      Ucx 10/2024 - neg     10/18/24 - PVR today 115cc.  Doing well overall.  No infections.  He wakes up once a night to void.  Sometimes very rarely it is harder to get the stream going but this is not a common occurrence.  He was given a prescription for detrol by his neurologist he has not yet taken this.  He has problems going back to sleep after waking up to void.    12/05/24 - PVR 78cc. Emptying bladder well. He has been retaining fluid, notice edema in legs since 2 weeks. Wanted to make sure he isnt retaining urine          Current Medications:  Current Outpatient Medications   Medication Sig Dispense Refill    apixaban (Eliquis) 2.5 mg tablet Take 1 tablet (2.5 mg) by mouth 2 times a day.      atorvastatin (Lipitor) 20 mg tablet Take 1 tablet (20 mg) by mouth once daily.      azelastine (Astelin) 137 mcg (0.1 %) nasal spray Administer 2 sprays into each nostril 2 times a day. Use in each nostril as directed      carbidopa-levodopa (Sinemet)  mg tablet take 2 tablets by mouth daily AT 7AM and AT 11AM and take 2 and 1/2 tablets daily AT 3PM and AT 7PM      cholecalciferol (Vitamin D-3) 25 MCG (1000 UT) capsule Take 1 capsule (25 mcg) by mouth once daily.      coenzyme Q-10 100 mg capsule Take 2 capsules (200 mg) by mouth once daily.      diclofenac sodium (Voltaren) 1 % gel Apply 4.5 inches (4 g) topically 2 times a day as needed. To lower back      famotidine (Pepcid) 20 mg tablet Take by mouth once daily at bedtime.      finasteride (Proscar) 5 mg tablet Take 1 tablet (5 mg) by mouth once daily. Do not crush, chew, or split. 90 tablet 3    glucosamine-chondroit-vit C-Mn 500-400 mg capsule Take 2 capsules by mouth once daily.       ipratropium (Atrovent) 21 mcg (0.03 %) nasal spray Administer 2 sprays into each nostril 2 times a day.      levothyroxine (Synthroid, Levoxyl) 88 mcg tablet Take 1 tablet (88 mcg) by mouth once daily in the morning. Take before meals.      multivitamin tablet Take 1 tablet by mouth once daily.      olopatadine (Patanol) 0.1 % ophthalmic solution Administer 1 drop into both eyes 2 times a day.      tamsulosin (Flomax) 0.4 mg 24 hr capsule Take 1 capsule (0.4 mg) by mouth once daily. 90 capsule 3    torsemide (Demadex) 20 mg tablet Take 1 tablet (20 mg) by mouth once daily as needed (Please take 20mg torsemide daily if weight increases >2 lbs over a two day period. Please contact cardiologist at those times for further instructions.). 90 tablet 1     No current facility-administered medications for this visit.        Active Problems:  Brady Ortega is a 79 y.o. male with the following Problems and Medications.  Patient Active Problem List   Diagnosis    Physical deconditioning    Cellulitis of right leg    Urine retention    Dysuria    Acute kidney injury superimposed on CKD (CMS-HCC)    Hyponatremia    Anemia    Atrial fibrillation (Multi)    Parkinson's disease without dyskinesia or fluctuating manifestations    Heart failure with preserved ejection fraction    Neuropathy    Chronic back pain    Hypothyroidism    Hyperparathyroidism (Multi)    Muscle spasms of neck    Anasarca    Other superficial bite of lip, initial encounter    Discharge planning issues    Shock (Multi)    Sepsis with acute organ dysfunction and septic shock (Multi)    Wild-type transthyretin-related (ATTR) amyloidosis (Multi)    Goals of care, counseling/discussion    Hypophosphatemia    Weak urinary stream    Benign prostatic hyperplasia with incomplete bladder emptying    Personal history UTI    Carpal tunnel syndrome, bilateral     Current Outpatient Medications   Medication Sig Dispense Refill    apixaban (Eliquis) 2.5 mg tablet Take 1  tablet (2.5 mg) by mouth 2 times a day.      atorvastatin (Lipitor) 20 mg tablet Take 1 tablet (20 mg) by mouth once daily.      azelastine (Astelin) 137 mcg (0.1 %) nasal spray Administer 2 sprays into each nostril 2 times a day. Use in each nostril as directed      carbidopa-levodopa (Sinemet)  mg tablet take 2 tablets by mouth daily AT 7AM and AT 11AM and take 2 and 1/2 tablets daily AT 3PM and AT 7PM      cholecalciferol (Vitamin D-3) 25 MCG (1000 UT) capsule Take 1 capsule (25 mcg) by mouth once daily.      coenzyme Q-10 100 mg capsule Take 2 capsules (200 mg) by mouth once daily.      diclofenac sodium (Voltaren) 1 % gel Apply 4.5 inches (4 g) topically 2 times a day as needed. To lower back      famotidine (Pepcid) 20 mg tablet Take by mouth once daily at bedtime.      finasteride (Proscar) 5 mg tablet Take 1 tablet (5 mg) by mouth once daily. Do not crush, chew, or split. 90 tablet 3    glucosamine-chondroit-vit C-Mn 500-400 mg capsule Take 2 capsules by mouth once daily.      ipratropium (Atrovent) 21 mcg (0.03 %) nasal spray Administer 2 sprays into each nostril 2 times a day.      levothyroxine (Synthroid, Levoxyl) 88 mcg tablet Take 1 tablet (88 mcg) by mouth once daily in the morning. Take before meals.      multivitamin tablet Take 1 tablet by mouth once daily.      olopatadine (Patanol) 0.1 % ophthalmic solution Administer 1 drop into both eyes 2 times a day.      tamsulosin (Flomax) 0.4 mg 24 hr capsule Take 1 capsule (0.4 mg) by mouth once daily. 90 capsule 3    torsemide (Demadex) 20 mg tablet Take 1 tablet (20 mg) by mouth once daily as needed (Please take 20mg torsemide daily if weight increases >2 lbs over a two day period. Please contact cardiologist at those times for further instructions.). 90 tablet 1     No current facility-administered medications for this visit.       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    Hordeolum internum left lower eyelid 2015    Hordeolum internum of left lower eyelid    Noninfective gastroenteritis and colitis, unspecified     Colitis    Other bursitis of knee, unspecified knee     Knee bursitis    Personal history of other diseases of the respiratory system     History of asthma    Personal history of other endocrine, nutritional and metabolic disease     History of hyperlipidemia       PSH:  Past Surgical History:   Procedure Laterality Date    BACK SURGERY  11/15/2013    Back Surgery    HERNIA REPAIR  11/15/2013    Hernia Repair    LUNG LOBECTOMY  2017    Lung Lobectomy    OTHER SURGICAL HISTORY  2017    Wedge Resection Of Lung    TOTAL HIP ARTHROPLASTY  11/15/2013    Hip Replacement    VASECTOMY  11/15/2013    Surgery Vas Deferens Vasectomy       FMH:  No family history on file.    SHx:  Social History     Tobacco Use    Smoking status: Former     Current packs/day: 0.00     Types: Cigarettes     Quit date:      Years since quittin.9     Passive exposure: Past    Smokeless tobacco: Never   Substance Use Topics    Alcohol use: Never    Drug use: Never       Allergies:  Allergies   Allergen Reactions    Pollen Extracts Unknown     Sneezing,itching,watery eyes    Bactrim [Sulfamethoxazole-Trimethoprim] Unknown    Risedronate Unknown     Bone pain    Sulfa (Sulfonamide Antibiotics) Unknown         Assessment/Plan  Has been retaining fluids, edema in legs since 2 weeks. Will order labs; BMP and BNP. His retention is most likely not urologic related given his emptying status. Advised to follow up with Dr. Quach, PCP and cardiologist. He will be seeing his cardiologist tomorrow. Ordered labs in advance of those appointments to facilitate care    Follow up in 6 months.    Scribe Attestation  By signing my name below, Jenifer ANDRE Scribe,  attest that this documentation has been prepared under the direction and in the presence of Lino Berry MD.

## 2024-12-09 ENCOUNTER — HOSPITAL ENCOUNTER (EMERGENCY)
Facility: HOSPITAL | Age: 79
Discharge: OTHER INSTITUTION | End: 2024-12-10
Attending: EMERGENCY MEDICINE
Payer: COMMERCIAL

## 2024-12-09 ENCOUNTER — APPOINTMENT (OUTPATIENT)
Dept: RADIOLOGY | Facility: HOSPITAL | Age: 79
End: 2024-12-09
Payer: COMMERCIAL

## 2024-12-09 DIAGNOSIS — L03.115 CELLULITIS OF RIGHT LOWER EXTREMITY: Primary | ICD-10-CM

## 2024-12-09 LAB
ALBUMIN SERPL BCP-MCNC: 3.3 G/DL (ref 3.4–5)
ALP SERPL-CCNC: 119 U/L (ref 33–136)
ALT SERPL W P-5'-P-CCNC: 5 U/L (ref 10–52)
ANION GAP SERPL CALC-SCNC: 12 MMOL/L (ref 10–20)
AST SERPL W P-5'-P-CCNC: 33 U/L (ref 9–39)
BASOPHILS # BLD AUTO: 0.01 X10*3/UL (ref 0–0.1)
BASOPHILS NFR BLD AUTO: 0.1 %
BILIRUB SERPL-MCNC: 0.8 MG/DL (ref 0–1.2)
BNP SERPL-MCNC: 1141 PG/ML (ref 0–99)
BUN SERPL-MCNC: 64 MG/DL (ref 6–23)
CALCIUM SERPL-MCNC: 8.7 MG/DL (ref 8.6–10.3)
CHLORIDE SERPL-SCNC: 104 MMOL/L (ref 98–107)
CO2 SERPL-SCNC: 22 MMOL/L (ref 21–32)
CREAT SERPL-MCNC: 2.1 MG/DL (ref 0.5–1.3)
CRP SERPL-MCNC: 10.59 MG/DL
EGFRCR SERPLBLD CKD-EPI 2021: 31 ML/MIN/1.73M*2
EOSINOPHIL # BLD AUTO: 0.06 X10*3/UL (ref 0–0.4)
EOSINOPHIL NFR BLD AUTO: 0.8 %
ERYTHROCYTE [DISTWIDTH] IN BLOOD BY AUTOMATED COUNT: 15.3 % (ref 11.5–14.5)
ERYTHROCYTE [SEDIMENTATION RATE] IN BLOOD BY WESTERGREN METHOD: 28 MM/H (ref 0–20)
GLUCOSE SERPL-MCNC: 149 MG/DL (ref 74–99)
HCT VFR BLD AUTO: 34.1 % (ref 41–52)
HGB BLD-MCNC: 11.4 G/DL (ref 13.5–17.5)
IMM GRANULOCYTES # BLD AUTO: 0.04 X10*3/UL (ref 0–0.5)
IMM GRANULOCYTES NFR BLD AUTO: 0.6 % (ref 0–0.9)
LYMPHOCYTES # BLD AUTO: 0.5 X10*3/UL (ref 0.8–3)
LYMPHOCYTES NFR BLD AUTO: 6.9 %
MCH RBC QN AUTO: 28.9 PG (ref 26–34)
MCHC RBC AUTO-ENTMCNC: 33.4 G/DL (ref 32–36)
MCV RBC AUTO: 86 FL (ref 80–100)
MONOCYTES # BLD AUTO: 0.51 X10*3/UL (ref 0.05–0.8)
MONOCYTES NFR BLD AUTO: 7 %
NEUTROPHILS # BLD AUTO: 6.15 X10*3/UL (ref 1.6–5.5)
NEUTROPHILS NFR BLD AUTO: 84.6 %
NRBC BLD-RTO: 0 /100 WBCS (ref 0–0)
PLATELET # BLD AUTO: 125 X10*3/UL (ref 150–450)
POTASSIUM SERPL-SCNC: 4 MMOL/L (ref 3.5–5.3)
PROT SERPL-MCNC: 5.8 G/DL (ref 6.4–8.2)
RBC # BLD AUTO: 3.95 X10*6/UL (ref 4.5–5.9)
SODIUM SERPL-SCNC: 134 MMOL/L (ref 136–145)
WBC # BLD AUTO: 7.3 X10*3/UL (ref 4.4–11.3)

## 2024-12-09 PROCEDURE — 83880 ASSAY OF NATRIURETIC PEPTIDE: CPT | Performed by: EMERGENCY MEDICINE

## 2024-12-09 PROCEDURE — 99285 EMERGENCY DEPT VISIT HI MDM: CPT | Performed by: EMERGENCY MEDICINE

## 2024-12-09 PROCEDURE — 80053 COMPREHEN METABOLIC PANEL: CPT | Performed by: EMERGENCY MEDICINE

## 2024-12-09 PROCEDURE — 86140 C-REACTIVE PROTEIN: CPT | Performed by: EMERGENCY MEDICINE

## 2024-12-09 PROCEDURE — 85025 COMPLETE CBC W/AUTO DIFF WBC: CPT | Performed by: EMERGENCY MEDICINE

## 2024-12-09 PROCEDURE — 2500000002 HC RX 250 W HCPCS SELF ADMINISTERED DRUGS (ALT 637 FOR MEDICARE OP, ALT 636 FOR OP/ED): Performed by: EMERGENCY MEDICINE

## 2024-12-09 PROCEDURE — 85652 RBC SED RATE AUTOMATED: CPT | Performed by: EMERGENCY MEDICINE

## 2024-12-09 PROCEDURE — 96365 THER/PROPH/DIAG IV INF INIT: CPT

## 2024-12-09 PROCEDURE — 36415 COLL VENOUS BLD VENIPUNCTURE: CPT | Performed by: EMERGENCY MEDICINE

## 2024-12-09 PROCEDURE — 2500000004 HC RX 250 GENERAL PHARMACY W/ HCPCS (ALT 636 FOR OP/ED): Mod: JZ | Performed by: EMERGENCY MEDICINE

## 2024-12-09 PROCEDURE — 73610 X-RAY EXAM OF ANKLE: CPT | Mod: RT

## 2024-12-09 PROCEDURE — 73610 X-RAY EXAM OF ANKLE: CPT | Mod: RIGHT SIDE | Performed by: RADIOLOGY

## 2024-12-09 RX ORDER — CARBIDOPA AND LEVODOPA 25; 100 MG/1; MG/1
2 TABLET, EXTENDED RELEASE ORAL 2 TIMES DAILY
Status: DISCONTINUED | OUTPATIENT
Start: 2024-12-10 | End: 2024-12-09

## 2024-12-09 RX ORDER — CARBIDOPA AND LEVODOPA 25; 100 MG/1; MG/1
2.5 TABLET ORAL 2 TIMES DAILY
Status: DISCONTINUED | OUTPATIENT
Start: 2024-12-09 | End: 2024-12-10 | Stop reason: HOSPADM

## 2024-12-09 RX ORDER — CARBIDOPA AND LEVODOPA 25; 100 MG/1; MG/1
2 TABLET ORAL 2 TIMES DAILY
Status: DISCONTINUED | OUTPATIENT
Start: 2024-12-09 | End: 2024-12-09

## 2024-12-09 RX ORDER — CARBIDOPA AND LEVODOPA 25; 100 MG/1; MG/1
2.5 TABLET, EXTENDED RELEASE ORAL 2 TIMES DAILY
Status: DISCONTINUED | OUTPATIENT
Start: 2024-12-09 | End: 2024-12-09

## 2024-12-09 RX ORDER — CLINDAMYCIN PHOSPHATE 600 MG/50ML
600 INJECTION, SOLUTION INTRAVENOUS ONCE
Status: COMPLETED | OUTPATIENT
Start: 2024-12-09 | End: 2024-12-09

## 2024-12-09 RX ORDER — CARBIDOPA AND LEVODOPA 25; 100 MG/1; MG/1
2 TABLET ORAL 2 TIMES DAILY
Status: DISCONTINUED | OUTPATIENT
Start: 2024-12-10 | End: 2024-12-10 | Stop reason: HOSPADM

## 2024-12-09 ASSESSMENT — PAIN - FUNCTIONAL ASSESSMENT: PAIN_FUNCTIONAL_ASSESSMENT: 0-10

## 2024-12-09 ASSESSMENT — PAIN SCALES - GENERAL
PAINLEVEL_OUTOF10: 0 - NO PAIN
PAINLEVEL_OUTOF10: 0 - NO PAIN

## 2024-12-09 NOTE — ED PROVIDER NOTES
Limitations to History: None  Additional History Obtained from: Spouse    HPI:    Patient is presenting to the emergency department due to concerns for cellulitis.  Patient states that he has been having increasing right lower extremity swelling with some mild left lower extremity swelling   With associated erythema.  He said no fevers but did have 1 episode of chills that he was coming in from a cold that he related to an increase in his Parkinson's-like tremor.  Patient follows with outpatient palliative care and they started him on Keflex 48 hours ago.  Patient has been taking the medications as prescribed but noted continued erythema that is been getting progressively worse.  Denies any recent falls or injuries that he is aware of.  Has been taking his medications for his Parkinson's as prescribed.  Denies any other exacerbating factors.    ------------------------------------------------------------------------------------------------------------------------------------------  Physical Exam:    ED Triage Vitals [12/09/24 1323]   Temperature Heart Rate Respirations BP   36.7 °C (98.1 °F) 70 18 97/61      Pulse Ox Temp Source Heart Rate Source Patient Position   95 % Temporal -- --      BP Location FiO2 (%)     -- --        VS: As documented in the triage note and EMR flowsheet from this visit were reviewed.  General: Well appearing. No acute distress.   Eyes: Pupils round and reactive. No scleral icterus. No conjunctival injection  HENT: Atraumatic. Normocephalic. Moist mucous membranes. Trachea midline  CV: RRR, No MRG. + pedal edema appreciated.  Resp: Clear to auscultation bilaterally. Non-labored.    GI: Soft, nontender to palpation. Nondistended. No guarding, rigidity or rebound  Skin: Warm, dry, intact. No systemic rashes or lesions appreciated.  Erythematous right lower extremity  Extremities: Bilateral lower extremity edema, right sided ankle swelling moderate pedal edema with surrounding erythema, no  crepitus, no bony tenderness 2+ DP and PT pulses bilaterally  Neuro: Alert. No focal motor or sensory deficits observed. Speech fluent. Answers questions appropriately.   Psych: Appropriate. Kempt.    ------------------------------------------------------------------------------------------------------------------------------------------    Medical Decision Making   patient is presenting to the emergency department due to lower extremity swelling and erythema.  On exam the patient is awake and alert, does baseline neurologic function per the family at the bedside.  He is neurovascularly intact.  Denies any recent trauma.  Has been taking his antibiotics as prescribed.  Afebrile, otherwise well-perfused.  Chronically wears compression stockings and has taken his right lower compression stocking off which I feel is contributing to the worsening of right lower extremity swelling.  As he is neurovascularly intact will obtain basic blood work to assess for inflammatory markers and dispo per the above.  On reevaluation the patient's neurovascular exam is unchanged.  His inflammatory markers are elevated he has a slight BEKA compared to prior.  Due to the combination of this recommended admission for IV antibiotics.  Patient was consented for transfer to [facility]. patient accepted by medicine at [facility]    External Records Reviewed: I reviewed recent and relevant outside records including: HIE/Community Record  Escalation of Care: Appropriate for Transfer to other facility  Social Determinants Affecting Care:Multiple chronic illnesses  Prescription Drug Consideration: per orders  Diagnostic testing considered: per orders  Discussion of Management with Other Providers: I discussed the patient/results with: [facility] hospitalist    Objective Data  I have independently interpreted the following labs, imaging studies and MDM added to ED Course  Labs Reviewed   CBC WITH AUTO DIFFERENTIAL - Abnormal       Result Value    WBC  7.3      nRBC 0.0      RBC 3.95 (*)     Hemoglobin 11.4 (*)     Hematocrit 34.1 (*)     MCV 86      MCH 28.9      MCHC 33.4      RDW 15.3 (*)     Platelets 125 (*)     Neutrophils % 84.6      Immature Granulocytes %, Automated 0.6      Lymphocytes % 6.9      Monocytes % 7.0      Eosinophils % 0.8      Basophils % 0.1      Neutrophils Absolute 6.15 (*)     Immature Granulocytes Absolute, Automated 0.04      Lymphocytes Absolute 0.50 (*)     Monocytes Absolute 0.51      Eosinophils Absolute 0.06      Basophils Absolute 0.01     COMPREHENSIVE METABOLIC PANEL - Abnormal    Glucose 149 (*)     Sodium 134 (*)     Potassium 4.0      Chloride 104      Bicarbonate 22      Anion Gap 12      Urea Nitrogen 64 (*)     Creatinine 2.10 (*)     eGFR 31 (*)     Calcium 8.7      Albumin 3.3 (*)     Alkaline Phosphatase 119      Total Protein 5.8 (*)     AST 33      Bilirubin, Total 0.8      ALT 5 (*)    C-REACTIVE PROTEIN - Abnormal    C-Reactive Protein 10.59 (*)    SEDIMENTATION RATE, AUTOMATED - Abnormal    Sedimentation Rate 28 (*)    B-TYPE NATRIURETIC PEPTIDE - Abnormal    BNP 1,141 (*)     Narrative:        <100 pg/mL - Heart failure unlikely  100-299 pg/mL - Intermediate probability of acute heart                  failure exacerbation. Correlate with clinical                  context and patient history.    >=300 pg/mL - Heart Failure likely. Correlate with clinical                  context and patient history.    BNP testing is performed using different testing methodology at [facility] than at other [facility]. Direct result comparisons should only be made within the same method.          XR ankle right 3+ views   Final Result   Question avulsion fracture versus ossicles inferior to the lateral   malleolus. Additional possible avulsion fracture or sequela of prior   trauma along the medial aspect of the calcaneus.  Correlate clinically   and with point tenderness.   Severe soft tissue swelling.   Signed  by Lenin Ley MD          ED Course  Diagnoses as of 12/09/24 1627   Cellulitis of right lower extremity       Procedure  Procedures    Disposition: transfer    Sultana Coleman DO  Emergency Medicine  Medical Toxicology     Sultana Coleman DO  12/09/24 1621

## 2024-12-09 NOTE — ED TRIAGE NOTES
Pt BIBEMS from home for Celulitis in right leg. As per EMS pt started Keflex on Saturday and it is not responding. Pt primary care provider would like to get check up and start IV antibiotic. Pt denies chest pain,   SOB, N/V/D. Pt stated he is working with [employer] [facility]. Pt is alert and oriented.

## 2024-12-09 NOTE — ED TRIAGE NOTES
Pt arrived to ED via EMS with c/o right lower leg edema. Per EMS pt was told to report to ED after course of PO ABX for cellulitis with no effect. Pt alert and oriented x3.

## 2024-12-09 NOTE — PROGRESS NOTES
Received patient in signout from Dr. Coleman. In short the patient is a 79 y.o. male presenting with concern for worsening cellulitis of the lower extremity. In the ED, inflammatory markers elevated, otherwise hemodynamically stable.  Started on IV antibiotics.  Excepted for transfer to [place] for further evaluation and management of lower extremity cellulitis. Patient transferred in stable condition.

## 2024-12-10 ENCOUNTER — HOSPITAL ENCOUNTER (INPATIENT)
Facility: HOSPITAL | Age: 79
LOS: 3 days | Discharge: HOME HEALTH | End: 2024-12-13
Attending: EMERGENCY MEDICINE | Admitting: STUDENT IN AN ORGANIZED HEALTH CARE EDUCATION/TRAINING PROGRAM
Payer: COMMERCIAL

## 2024-12-10 VITALS
TEMPERATURE: 98.2 F | OXYGEN SATURATION: 99 % | HEIGHT: 66 IN | HEART RATE: 69 BPM | RESPIRATION RATE: 16 BRPM | WEIGHT: 156 LBS | BODY MASS INDEX: 25.07 KG/M2 | DIASTOLIC BLOOD PRESSURE: 76 MMHG | SYSTOLIC BLOOD PRESSURE: 138 MMHG

## 2024-12-10 DIAGNOSIS — R29.898 DECONDITIONED LOW BACK: ICD-10-CM

## 2024-12-10 DIAGNOSIS — L30.9 DERMATITIS: ICD-10-CM

## 2024-12-10 DIAGNOSIS — M10.9 ACUTE GOUT OF RIGHT ANKLE, UNSPECIFIED CAUSE: ICD-10-CM

## 2024-12-10 DIAGNOSIS — L03.115 CELLULITIS OF RIGHT LEG: ICD-10-CM

## 2024-12-10 DIAGNOSIS — L03.90 CELLULITIS: Primary | ICD-10-CM

## 2024-12-10 LAB
ANION GAP SERPL CALC-SCNC: 13 MMOL/L (ref 10–20)
BUN SERPL-MCNC: 63 MG/DL (ref 6–23)
CALCIUM SERPL-MCNC: 8.5 MG/DL (ref 8.6–10.3)
CHLORIDE SERPL-SCNC: 105 MMOL/L (ref 98–107)
CO2 SERPL-SCNC: 20 MMOL/L (ref 21–32)
CREAT SERPL-MCNC: 1.93 MG/DL (ref 0.5–1.3)
EGFRCR SERPLBLD CKD-EPI 2021: 34 ML/MIN/1.73M*2
ERYTHROCYTE [DISTWIDTH] IN BLOOD BY AUTOMATED COUNT: 15.1 % (ref 11.5–14.5)
GLUCOSE SERPL-MCNC: 114 MG/DL (ref 74–99)
HCT VFR BLD AUTO: 31.7 % (ref 41–52)
HGB BLD-MCNC: 10.2 G/DL (ref 13.5–17.5)
MCH RBC QN AUTO: 27.5 PG (ref 26–34)
MCHC RBC AUTO-ENTMCNC: 32.2 G/DL (ref 32–36)
MCV RBC AUTO: 85 FL (ref 80–100)
NRBC BLD-RTO: ABNORMAL /100{WBCS}
PLATELET # BLD AUTO: 110 X10*3/UL (ref 150–450)
POTASSIUM SERPL-SCNC: 4.1 MMOL/L (ref 3.5–5.3)
RBC # BLD AUTO: 3.71 X10*6/UL (ref 4.5–5.9)
SODIUM SERPL-SCNC: 134 MMOL/L (ref 136–145)
URATE SERPL-MCNC: 10.8 MG/DL (ref 4–7.5)
WBC # BLD AUTO: 6.1 X10*3/UL (ref 4.4–11.3)

## 2024-12-10 PROCEDURE — 99232 SBSQ HOSP IP/OBS MODERATE 35: CPT | Performed by: EMERGENCY MEDICINE

## 2024-12-10 PROCEDURE — 2500000002 HC RX 250 W HCPCS SELF ADMINISTERED DRUGS (ALT 637 FOR MEDICARE OP, ALT 636 FOR OP/ED): Performed by: PHARMACIST

## 2024-12-10 PROCEDURE — 85027 COMPLETE CBC AUTOMATED: CPT | Performed by: STUDENT IN AN ORGANIZED HEALTH CARE EDUCATION/TRAINING PROGRAM

## 2024-12-10 PROCEDURE — 2500000001 HC RX 250 WO HCPCS SELF ADMINISTERED DRUGS (ALT 637 FOR MEDICARE OP): Performed by: EMERGENCY MEDICINE

## 2024-12-10 PROCEDURE — 97530 THERAPEUTIC ACTIVITIES: CPT | Mod: GP

## 2024-12-10 PROCEDURE — 2500000001 HC RX 250 WO HCPCS SELF ADMINISTERED DRUGS (ALT 637 FOR MEDICARE OP): Performed by: PHARMACIST

## 2024-12-10 PROCEDURE — 80048 BASIC METABOLIC PNL TOTAL CA: CPT | Performed by: STUDENT IN AN ORGANIZED HEALTH CARE EDUCATION/TRAINING PROGRAM

## 2024-12-10 PROCEDURE — 2500000002 HC RX 250 W HCPCS SELF ADMINISTERED DRUGS (ALT 637 FOR MEDICARE OP, ALT 636 FOR OP/ED): Performed by: EMERGENCY MEDICINE

## 2024-12-10 PROCEDURE — 36415 COLL VENOUS BLD VENIPUNCTURE: CPT | Performed by: STUDENT IN AN ORGANIZED HEALTH CARE EDUCATION/TRAINING PROGRAM

## 2024-12-10 PROCEDURE — 84550 ASSAY OF BLOOD/URIC ACID: CPT | Performed by: STUDENT IN AN ORGANIZED HEALTH CARE EDUCATION/TRAINING PROGRAM

## 2024-12-10 PROCEDURE — 1100000001 HC PRIVATE ROOM DAILY

## 2024-12-10 PROCEDURE — 97116 GAIT TRAINING THERAPY: CPT | Mod: GP

## 2024-12-10 PROCEDURE — 2500000004 HC RX 250 GENERAL PHARMACY W/ HCPCS (ALT 636 FOR OP/ED): Mod: JZ | Performed by: STUDENT IN AN ORGANIZED HEALTH CARE EDUCATION/TRAINING PROGRAM

## 2024-12-10 PROCEDURE — 2500000001 HC RX 250 WO HCPCS SELF ADMINISTERED DRUGS (ALT 637 FOR MEDICARE OP): Performed by: STUDENT IN AN ORGANIZED HEALTH CARE EDUCATION/TRAINING PROGRAM

## 2024-12-10 PROCEDURE — 97162 PT EVAL MOD COMPLEX 30 MIN: CPT | Mod: GP

## 2024-12-10 PROCEDURE — 2500000002 HC RX 250 W HCPCS SELF ADMINISTERED DRUGS (ALT 637 FOR MEDICARE OP, ALT 636 FOR OP/ED): Performed by: STUDENT IN AN ORGANIZED HEALTH CARE EDUCATION/TRAINING PROGRAM

## 2024-12-10 PROCEDURE — 99223 1ST HOSP IP/OBS HIGH 75: CPT | Performed by: STUDENT IN AN ORGANIZED HEALTH CARE EDUCATION/TRAINING PROGRAM

## 2024-12-10 RX ORDER — CARBIDOPA AND LEVODOPA 25; 100 MG/1; MG/1
2 TABLET ORAL 2 TIMES DAILY
Status: DISCONTINUED | OUTPATIENT
Start: 2024-12-10 | End: 2024-12-10

## 2024-12-10 RX ORDER — CEFAZOLIN SODIUM 2 G/100ML
2 INJECTION, SOLUTION INTRAVENOUS EVERY 12 HOURS
Status: DISCONTINUED | OUTPATIENT
Start: 2024-12-10 | End: 2024-12-10

## 2024-12-10 RX ORDER — POLYETHYLENE GLYCOL 3350 17 G/17G
17 POWDER, FOR SOLUTION ORAL DAILY
Status: DISCONTINUED | OUTPATIENT
Start: 2024-12-10 | End: 2024-12-13 | Stop reason: HOSPADM

## 2024-12-10 RX ORDER — FAMOTIDINE 20 MG/1
10 TABLET, FILM COATED ORAL NIGHTLY
Status: DISCONTINUED | OUTPATIENT
Start: 2024-12-10 | End: 2024-12-13 | Stop reason: HOSPADM

## 2024-12-10 RX ORDER — CARBIDOPA AND LEVODOPA 25; 100 MG/1; MG/1
2 TABLET ORAL 2 TIMES DAILY
Status: DISCONTINUED | OUTPATIENT
Start: 2024-12-11 | End: 2024-12-13 | Stop reason: HOSPADM

## 2024-12-10 RX ORDER — FINASTERIDE 5 MG/1
5 TABLET, FILM COATED ORAL DAILY
Status: DISCONTINUED | OUTPATIENT
Start: 2024-12-10 | End: 2024-12-10

## 2024-12-10 RX ORDER — TAMSULOSIN HYDROCHLORIDE 0.4 MG/1
0.4 CAPSULE ORAL NIGHTLY
Status: DISCONTINUED | OUTPATIENT
Start: 2024-12-10 | End: 2024-12-13 | Stop reason: HOSPADM

## 2024-12-10 RX ORDER — CINACALCET 30 MG/1
30 TABLET, FILM COATED ORAL
Status: DISCONTINUED | OUTPATIENT
Start: 2024-12-10 | End: 2024-12-13 | Stop reason: HOSPADM

## 2024-12-10 RX ORDER — ONDANSETRON HYDROCHLORIDE 2 MG/ML
4 INJECTION, SOLUTION INTRAVENOUS EVERY 4 HOURS PRN
Status: DISCONTINUED | OUTPATIENT
Start: 2024-12-10 | End: 2024-12-13 | Stop reason: HOSPADM

## 2024-12-10 RX ORDER — FINASTERIDE 5 MG/1
5 TABLET, FILM COATED ORAL NIGHTLY
Status: DISCONTINUED | OUTPATIENT
Start: 2024-12-10 | End: 2024-12-13 | Stop reason: HOSPADM

## 2024-12-10 RX ORDER — ATORVASTATIN CALCIUM 20 MG/1
20 TABLET, FILM COATED ORAL NIGHTLY
Status: DISCONTINUED | OUTPATIENT
Start: 2024-12-10 | End: 2024-12-10

## 2024-12-10 RX ORDER — DOCUSATE SODIUM 100 MG/1
100 CAPSULE, LIQUID FILLED ORAL 2 TIMES DAILY PRN
Status: DISCONTINUED | OUTPATIENT
Start: 2024-12-10 | End: 2024-12-13 | Stop reason: HOSPADM

## 2024-12-10 RX ORDER — ACETAMINOPHEN 325 MG/1
650 TABLET ORAL EVERY 6 HOURS PRN
Status: DISCONTINUED | OUTPATIENT
Start: 2024-12-10 | End: 2024-12-13 | Stop reason: HOSPADM

## 2024-12-10 RX ORDER — LEVOTHYROXINE SODIUM 88 UG/1
88 TABLET ORAL
Status: DISCONTINUED | OUTPATIENT
Start: 2024-12-10 | End: 2024-12-13 | Stop reason: HOSPADM

## 2024-12-10 RX ORDER — CARBIDOPA AND LEVODOPA 25; 100 MG/1; MG/1
2.5 TABLET ORAL 2 TIMES DAILY
Status: DISCONTINUED | OUTPATIENT
Start: 2024-12-10 | End: 2024-12-13 | Stop reason: HOSPADM

## 2024-12-10 RX ORDER — CHOLECALCIFEROL (VITAMIN D3) 25 MCG
1000 TABLET ORAL DAILY
Status: DISCONTINUED | OUTPATIENT
Start: 2024-12-10 | End: 2024-12-13 | Stop reason: HOSPADM

## 2024-12-10 RX ORDER — TORSEMIDE 20 MG/1
20 TABLET ORAL DAILY PRN
Status: DISCONTINUED | OUTPATIENT
Start: 2024-12-10 | End: 2024-12-10

## 2024-12-10 RX ORDER — TALC
3 POWDER (GRAM) TOPICAL NIGHTLY PRN
Status: DISCONTINUED | OUTPATIENT
Start: 2024-12-10 | End: 2024-12-13 | Stop reason: HOSPADM

## 2024-12-10 RX ORDER — CLINDAMYCIN PHOSPHATE 600 MG/50ML
600 INJECTION, SOLUTION INTRAVENOUS EVERY 8 HOURS
Status: DISCONTINUED | OUTPATIENT
Start: 2024-12-10 | End: 2024-12-13

## 2024-12-10 RX ORDER — DICLOFENAC SODIUM 10 MG/G
4 GEL TOPICAL 2 TIMES DAILY PRN
Status: DISCONTINUED | OUTPATIENT
Start: 2024-12-10 | End: 2024-12-13 | Stop reason: HOSPADM

## 2024-12-10 RX ORDER — IPRATROPIUM BROMIDE 21 UG/1
2 SPRAY, METERED NASAL 2 TIMES DAILY
Status: DISCONTINUED | OUTPATIENT
Start: 2024-12-10 | End: 2024-12-12 | Stop reason: RX

## 2024-12-10 RX ORDER — SIMETHICONE 80 MG
80 TABLET,CHEWABLE ORAL 4 TIMES DAILY PRN
Status: DISCONTINUED | OUTPATIENT
Start: 2024-12-10 | End: 2024-12-13 | Stop reason: HOSPADM

## 2024-12-10 RX ORDER — KETOTIFEN FUMARATE 0.35 MG/ML
1 SOLUTION/ DROPS OPHTHALMIC 2 TIMES DAILY
Status: DISCONTINUED | OUTPATIENT
Start: 2024-12-10 | End: 2024-12-10

## 2024-12-10 RX ORDER — FAMOTIDINE 20 MG/1
10 TABLET, FILM COATED ORAL DAILY
Status: DISCONTINUED | OUTPATIENT
Start: 2024-12-10 | End: 2024-12-10

## 2024-12-10 RX ORDER — GABAPENTIN 100 MG/1
100 CAPSULE ORAL 2 TIMES DAILY
Status: DISCONTINUED | OUTPATIENT
Start: 2024-12-10 | End: 2024-12-13 | Stop reason: HOSPADM

## 2024-12-10 RX ORDER — TORSEMIDE 20 MG/1
40 TABLET ORAL DAILY
Status: DISCONTINUED | OUTPATIENT
Start: 2024-12-10 | End: 2024-12-13 | Stop reason: HOSPADM

## 2024-12-10 RX ORDER — AZELASTINE 1 MG/ML
2 SPRAY, METERED NASAL 2 TIMES DAILY
Status: DISCONTINUED | OUTPATIENT
Start: 2024-12-10 | End: 2024-12-12 | Stop reason: RX

## 2024-12-10 RX ORDER — HYDROCODONE BITARTRATE AND ACETAMINOPHEN 5; 325 MG/1; MG/1
1 TABLET ORAL EVERY 6 HOURS PRN
Status: DISCONTINUED | OUTPATIENT
Start: 2024-12-10 | End: 2024-12-13 | Stop reason: HOSPADM

## 2024-12-10 RX ORDER — CINACALCET 30 MG/1
30 TABLET, FILM COATED ORAL
COMMUNITY

## 2024-12-10 RX ORDER — GABAPENTIN 100 MG/1
100 CAPSULE ORAL 2 TIMES DAILY
COMMUNITY

## 2024-12-10 RX ORDER — ATORVASTATIN CALCIUM 20 MG/1
20 TABLET, FILM COATED ORAL DAILY
Status: DISCONTINUED | OUTPATIENT
Start: 2024-12-10 | End: 2024-12-13 | Stop reason: HOSPADM

## 2024-12-10 SDOH — ECONOMIC STABILITY: FOOD INSECURITY: WITHIN THE PAST 12 MONTHS, THE FOOD YOU BOUGHT JUST DIDN'T LAST AND YOU DIDN'T HAVE MONEY TO GET MORE.: NEVER TRUE

## 2024-12-10 SDOH — ECONOMIC STABILITY: FOOD INSECURITY: WITHIN THE PAST 12 MONTHS, YOU WORRIED THAT YOUR FOOD WOULD RUN OUT BEFORE YOU GOT THE MONEY TO BUY MORE.: NEVER TRUE

## 2024-12-10 SDOH — ECONOMIC STABILITY: HOUSING INSECURITY: IN THE LAST 12 MONTHS, WAS THERE A TIME WHEN YOU WERE NOT ABLE TO PAY THE MORTGAGE OR RENT ON TIME?: NO

## 2024-12-10 SDOH — ECONOMIC STABILITY: INCOME INSECURITY: IN THE PAST 12 MONTHS HAS THE ELECTRIC, GAS, OIL, OR WATER COMPANY THREATENED TO SHUT OFF SERVICES IN YOUR HOME?: NO

## 2024-12-10 SDOH — SOCIAL STABILITY: SOCIAL INSECURITY: HAVE YOU HAD THOUGHTS OF HARMING ANYONE ELSE?: NO

## 2024-12-10 SDOH — SOCIAL STABILITY: SOCIAL INSECURITY
WITHIN THE LAST YEAR, HAVE YOU BEEN KICKED, HIT, SLAPPED, OR OTHERWISE PHYSICALLY HURT BY YOUR PARTNER OR EX-PARTNER?: NO

## 2024-12-10 SDOH — SOCIAL STABILITY: SOCIAL INSECURITY
WITHIN THE LAST YEAR, HAVE YOU BEEN RAPED OR FORCED TO HAVE ANY KIND OF SEXUAL ACTIVITY BY YOUR PARTNER OR EX-PARTNER?: NO

## 2024-12-10 SDOH — SOCIAL STABILITY: SOCIAL INSECURITY: WITHIN THE LAST YEAR, HAVE YOU BEEN AFRAID OF YOUR PARTNER OR EX-PARTNER?: NO

## 2024-12-10 SDOH — ECONOMIC STABILITY: TRANSPORTATION INSECURITY: IN THE PAST 12 MONTHS, HAS LACK OF TRANSPORTATION KEPT YOU FROM MEDICAL APPOINTMENTS OR FROM GETTING MEDICATIONS?: NO

## 2024-12-10 SDOH — ECONOMIC STABILITY: FOOD INSECURITY: HOW HARD IS IT FOR YOU TO PAY FOR THE VERY BASICS LIKE FOOD, HOUSING, MEDICAL CARE, AND HEATING?: NOT HARD AT ALL

## 2024-12-10 SDOH — ECONOMIC STABILITY: HOUSING INSECURITY: AT ANY TIME IN THE PAST 12 MONTHS, WERE YOU HOMELESS OR LIVING IN A SHELTER (INCLUDING NOW)?: NO

## 2024-12-10 SDOH — SOCIAL STABILITY: SOCIAL INSECURITY: ARE THERE ANY APPARENT SIGNS OF INJURIES/BEHAVIORS THAT COULD BE RELATED TO ABUSE/NEGLECT?: NO

## 2024-12-10 SDOH — SOCIAL STABILITY: SOCIAL INSECURITY: WITHIN THE LAST YEAR, HAVE YOU BEEN HUMILIATED OR EMOTIONALLY ABUSED IN OTHER WAYS BY YOUR PARTNER OR EX-PARTNER?: NO

## 2024-12-10 SDOH — SOCIAL STABILITY: SOCIAL INSECURITY: DOES ANYONE TRY TO KEEP YOU FROM HAVING/CONTACTING OTHER FRIENDS OR DOING THINGS OUTSIDE YOUR HOME?: NO

## 2024-12-10 SDOH — SOCIAL STABILITY: SOCIAL INSECURITY: HAS ANYONE EVER THREATENED TO HURT YOUR FAMILY OR YOUR PETS?: NO

## 2024-12-10 SDOH — SOCIAL STABILITY: SOCIAL INSECURITY: ABUSE: ADULT

## 2024-12-10 SDOH — ECONOMIC STABILITY: HOUSING INSECURITY: IN THE PAST 12 MONTHS, HOW MANY TIMES HAVE YOU MOVED WHERE YOU WERE LIVING?: 0

## 2024-12-10 SDOH — SOCIAL STABILITY: SOCIAL INSECURITY: WERE YOU ABLE TO COMPLETE ALL THE BEHAVIORAL HEALTH SCREENINGS?: YES

## 2024-12-10 SDOH — SOCIAL STABILITY: SOCIAL INSECURITY: ARE YOU OR HAVE YOU BEEN THREATENED OR ABUSED PHYSICALLY, EMOTIONALLY, OR SEXUALLY BY ANYONE?: NO

## 2024-12-10 SDOH — SOCIAL STABILITY: SOCIAL INSECURITY: DO YOU FEEL ANYONE HAS EXPLOITED OR TAKEN ADVANTAGE OF YOU FINANCIALLY OR OF YOUR PERSONAL PROPERTY?: NO

## 2024-12-10 SDOH — SOCIAL STABILITY: SOCIAL INSECURITY: DO YOU FEEL UNSAFE GOING BACK TO THE PLACE WHERE YOU ARE LIVING?: NO

## 2024-12-10 ASSESSMENT — ACTIVITIES OF DAILY LIVING (ADL)
PATIENT'S MEMORY ADEQUATE TO SAFELY COMPLETE DAILY ACTIVITIES?: YES
LACK_OF_TRANSPORTATION: NO
ADEQUATE_TO_COMPLETE_ADL: YES
HEARING - RIGHT EAR: HEARING AID
TOILETING: INDEPENDENT
BATHING: NEEDS ASSISTANCE
FEEDING YOURSELF: INDEPENDENT
WALKS IN HOME: INDEPENDENT
JUDGMENT_ADEQUATE_SAFELY_COMPLETE_DAILY_ACTIVITIES: YES
ADL_ASSISTANCE: NEEDS ASSISTANCE
DRESSING YOURSELF: NEEDS ASSISTANCE
GROOMING: NEEDS ASSISTANCE
LACK_OF_TRANSPORTATION: NO
BATHING_ASSISTANCE: STAND BY
DRESSING_ASSISTANCE: MINIMAL
LACK_OF_TRANSPORTATION: NO
HEARING - LEFT EAR: HEARING AID

## 2024-12-10 ASSESSMENT — COGNITIVE AND FUNCTIONAL STATUS - GENERAL
PATIENT BASELINE BEDBOUND: NO
CLIMB 3 TO 5 STEPS WITH RAILING: A LITTLE
DRESSING REGULAR UPPER BODY CLOTHING: A LITTLE
DRESSING REGULAR LOWER BODY CLOTHING: A LITTLE
CLIMB 3 TO 5 STEPS WITH RAILING: A LITTLE
DAILY ACTIVITIY SCORE: 21
WALKING IN HOSPITAL ROOM: A LITTLE
HELP NEEDED FOR BATHING: A LITTLE
WALKING IN HOSPITAL ROOM: A LITTLE
STANDING UP FROM CHAIR USING ARMS: A LITTLE
MOVING TO AND FROM BED TO CHAIR: A LITTLE
WALKING IN HOSPITAL ROOM: A LITTLE
DRESSING REGULAR UPPER BODY CLOTHING: A LITTLE
MOVING TO AND FROM BED TO CHAIR: A LITTLE
DAILY ACTIVITIY SCORE: 21
DAILY ACTIVITIY SCORE: 21
DRESSING REGULAR LOWER BODY CLOTHING: A LITTLE
MOVING FROM LYING ON BACK TO SITTING ON SIDE OF FLAT BED WITH BEDRAILS: A LITTLE
STANDING UP FROM CHAIR USING ARMS: A LITTLE
HELP NEEDED FOR BATHING: A LITTLE
DRESSING REGULAR UPPER BODY CLOTHING: A LITTLE
TURNING FROM BACK TO SIDE WHILE IN FLAT BAD: A LITTLE
HELP NEEDED FOR BATHING: A LITTLE
CLIMB 3 TO 5 STEPS WITH RAILING: A LITTLE
MOBILITY SCORE: 19
DRESSING REGULAR LOWER BODY CLOTHING: A LITTLE
CLIMB 3 TO 5 STEPS WITH RAILING: A LITTLE
STANDING UP FROM CHAIR USING ARMS: A LITTLE
TURNING FROM BACK TO SIDE WHILE IN FLAT BAD: A LITTLE
TURNING FROM BACK TO SIDE WHILE IN FLAT BAD: A LITTLE
MOVING TO AND FROM BED TO CHAIR: A LITTLE
TURNING FROM BACK TO SIDE WHILE IN FLAT BAD: A LITTLE
MOBILITY SCORE: 19
MOVING TO AND FROM BED TO CHAIR: A LITTLE
MOBILITY SCORE: 19
MOBILITY SCORE: 18
WALKING IN HOSPITAL ROOM: A LITTLE
STANDING UP FROM CHAIR USING ARMS: A LITTLE

## 2024-12-10 ASSESSMENT — PAIN SCALES - GENERAL
PAINLEVEL_OUTOF10: 0 - NO PAIN
PAINLEVEL_OUTOF10: 6
PAINLEVEL_OUTOF10: 6
PAINLEVEL_OUTOF10: 0 - NO PAIN

## 2024-12-10 ASSESSMENT — PAIN DESCRIPTION - LOCATION: LOCATION: LEG

## 2024-12-10 ASSESSMENT — PATIENT HEALTH QUESTIONNAIRE - PHQ9
1. LITTLE INTEREST OR PLEASURE IN DOING THINGS: SEVERAL DAYS
2. FEELING DOWN, DEPRESSED OR HOPELESS: NOT AT ALL
SUM OF ALL RESPONSES TO PHQ9 QUESTIONS 1 & 2: 1

## 2024-12-10 ASSESSMENT — COLUMBIA-SUICIDE SEVERITY RATING SCALE - C-SSRS
6. HAVE YOU EVER DONE ANYTHING, STARTED TO DO ANYTHING, OR PREPARED TO DO ANYTHING TO END YOUR LIFE?: NO
2. HAVE YOU ACTUALLY HAD ANY THOUGHTS OF KILLING YOURSELF?: NO
1. IN THE PAST MONTH, HAVE YOU WISHED YOU WERE DEAD OR WISHED YOU COULD GO TO SLEEP AND NOT WAKE UP?: NO

## 2024-12-10 ASSESSMENT — PAIN - FUNCTIONAL ASSESSMENT
PAIN_FUNCTIONAL_ASSESSMENT: 0-10
PAIN_FUNCTIONAL_ASSESSMENT: UNABLE TO SELF-REPORT
PAIN_FUNCTIONAL_ASSESSMENT: UNABLE TO SELF-REPORT

## 2024-12-10 ASSESSMENT — LIFESTYLE VARIABLES
AUDIT-C TOTAL SCORE: 1
HOW OFTEN DO YOU HAVE 6 OR MORE DRINKS ON ONE OCCASION: NEVER
HOW MANY STANDARD DRINKS CONTAINING ALCOHOL DO YOU HAVE ON A TYPICAL DAY: 1 OR 2
HOW OFTEN DO YOU HAVE A DRINK CONTAINING ALCOHOL: MONTHLY OR LESS
SKIP TO QUESTIONS 9-10: 1
AUDIT-C TOTAL SCORE: 1

## 2024-12-10 ASSESSMENT — PAIN SCALES - WONG BAKER: WONGBAKER_NUMERICALRESPONSE: NO HURT

## 2024-12-10 ASSESSMENT — PAIN DESCRIPTION - DESCRIPTORS: DESCRIPTORS: ACHING

## 2024-12-10 ASSESSMENT — PAIN DESCRIPTION - ORIENTATION: ORIENTATION: RIGHT

## 2024-12-10 NOTE — NURSING NOTE
Vitals are stable.  Patient resting quietly in bed, HOB elevated.  Call light and possessions within within reach.  Bed alarm on.

## 2024-12-10 NOTE — NURSING NOTE
Patient's wife insists on changing the timing of the patient's medication. Message sent to pharmacy

## 2024-12-10 NOTE — CONSULTS
Consults    Reason For Consult  Right leg cellulitis    History Of Present Illness  Brady Ortega is a 79 y.o. male presenting with right leg cellulitis. He states that he has a history of cellulitis especially to the right lower leg. He notes three separate occasions of this issue in the past 20 years. He explains that each time he has responded well with broad spectrum antibiotics. Patient does not follow up with a podiatric provider. Patient has no other pedal complaints and denies constitutional symptoms..     Past Medical History  He has a past medical history of Dry eye syndrome of left lacrimal gland (10/30/2015), Dry eye syndrome of left lacrimal gland (10/30/2015), Hordeolum internum left lower eyelid (12/04/2015), Noninfective gastroenteritis and colitis, unspecified, Other bursitis of knee, unspecified knee, Personal history of other diseases of the respiratory system, and Personal history of other endocrine, nutritional and metabolic disease.    Surgical History  He has a past surgical history that includes Back surgery (11/15/2013); Hernia repair (11/15/2013); Vasectomy (11/15/2013); Total hip arthroplasty (11/15/2013); Lung lobectomy (01/13/2017); and Other surgical history (01/13/2017).     Social History  He reports that he quit smoking about 59 years ago. His smoking use included cigarettes. He has been exposed to tobacco smoke. He has never used smokeless tobacco. He reports that he does not drink alcohol and does not use drugs.    Family History  No family history on file.     Allergies  Pollen extracts, Bactrim [sulfamethoxazole-trimethoprim], Risedronate, and Sulfa (sulfonamide antibiotics)    Review of Systems    REVIEW OF SYSTEMS  GENERAL:  Negative for malaise, significant weight loss, fever  HEENT:  No changes in hearing or vision, no nose bleeds or other nasal problems and Negative for frequent or significant headaches  NECK:  Negative for lumps, goiter, pain and significant neck  "swelling  RESPIRATORY:  Negative for cough, wheezing and shortness of breath  CARDIOVASCULAR:  Negative for chest pain, leg swelling and palpitations  GI:  Negative for abdominal discomfort, blood in stools or black stools and change in bowel habits  :  Negative for dysuria, frequency and incontinence  MUSCULOSKELETAL:  Negative for joint pain or swelling, back pain, and muscle pain.  SKIN: Erythema to the right lower extremity. NO open lesions  PSYCH:  Negative for sleep disturbance, mood disorder and recent psychosocial stressors  HEMATOLOGY/LYMPHOLOGY:  Negative for prolonged bleeding, bruising easily, and swollen nodes.  ENDOCRINE:  Negative for cold or heat intolerance, polyuria, polydipsia and goiter  NEURO: Negative, denies any burning, tingling or numbness     Objective:   Vasc: DP and PT pulses are palpable bilateral.  CFT is less than 3 seconds bilateral.  Skin temperature is warm to warm especially to the right lower extremity. Moderate pitting edema noted to the right lower leg.    Neuro:  Light touch is intact to the foot bilateral.  Protective sensation is intact to the foot when tested with the 5.07 SWM bilateral.  There is no clonus noted.       Derm: Erythema noted to the right leg and ankle. No open lesions noted to bilateral lower extremities. No dependent rubor noted.    Ortho: Muscle strength is 4/5 for all pedal groups tested.  Ankle joint, subtalar joint, 1st MPJ and lesser MPJ ROM is limited with mild pain     Physical Exam     Last Recorded Vitals  Blood pressure 109/59, pulse 59, temperature 37.2 °C (99 °F), temperature source Temporal, resp. rate 18, height 1.676 m (5' 6\"), weight 59.4 kg (130 lb 15.3 oz), SpO2 100%.    Relevant Results  XR: 3 views of left ankle. No signs of fracture, open wound, or soft tissue gas     Assessment/Plan   Right lower extremity chronic venous hypertension I87.321  Right leg cellulitis L03.115    Plan:  - Patient was seen at bedside and is resting " comfortably.  A total of 55 minutes of face-to-face time was spent on this patient for professional services including but not limited to obtaining medially appropriate history, performing physical examination, collecting and explaining pertinent findings to patient, discussing relevant and viable treatment options, making appropriate level of medical decision, and coordinating care and facilitating treatment.  The end of the encounter was spent educating patient and family on treatment plan. All questions and concerns were answered and addressed to the pt's apparent satisfaction.  - Labs reviewed.  - Imaging reviewed. No concern for avulsion fracture of the right ankle  - Continue abx therapy per ID/Primary. No open lesions.  - Continue pain management per Medicine/Primary  - Uric acid is 10.8, cellulitis likely secondary to gouty attack. With patients hx of chronic gouty tophi consider krystexxa if patient is a candidate/ follow up with rheumatology  - Patient may benefit from outpatient podiatric follow up. Patient is clear for discharge per podiatry. Follow up instructions placed  - Weightbearing status: As tolerated  - Continue OTC compression socks  - All questions and concerns were answered and addressed to the patient and patient's family apparent satisfaction  - Treatment and plan was discussed with attending WARD Davila DPM (Surgical Fellow)  Podiatric Medicine & Surgery  Please Haiku message me with any questions or concerns.       I saw and evaluated the patient. I personally obtained the key and critical portions of the history and physical exam or was physically present for key and critical portions performed by the resident/fellow. I reviewed the resident/fellow's documentation and discussed the patient with the resident/fellow. I agree with the fellow's medical decision making as documented in the note.    Jaime Dunn DPM   [phone]

## 2024-12-10 NOTE — PROGRESS NOTES
Patient seen and examined; clinically stable  States his pain has resolved  He is alert and oriented  Lungs are clear  Cardiac is regular rate and rhythm normal S1-S2 without murmur gallop rub click S3 or S4 abdomen is benign  Extremities:  Right lower extremity erythematous painful to palpation mild edema  Pulses while difficult or palpable  Right ankle swollen increased pain to manipulation    Assessment/plan:  -Will continue clindamycin IV  -Consult podiatry  -Will get CT of the right ankle  -Consult PT  -Continue current medications

## 2024-12-10 NOTE — H&P
History Of Present Illness  Brady Ortega is a 79 y.o. male with history of Parkinson's disease, atrial fibrillation on Eliquis, HFpEF on torsemide, and chronic kidney disease who was presenting with cellulitis of the right lower extremity.  States that he initially had an erythematous patch on his right shin, and notes that it started to extend proximally and also eventually spread distally to his foot.  He has had swelling and intense pain as well.  No open wounds.  Was started on Keflex 2 days ago but his symptoms continued to progress.  Denies any constitutional complaints.  Has been taking Tylenol and gabapentin for pain.  Currently rates pain 7 out of 10.  He was started on clindamycin at [facility] prior to transfer.     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 10/30/2015    Dry eye syndrome of left lacrimal gland    Hordeolum internum left lower eyelid 12/04/2015    Hordeolum internum of left lower eyelid    Noninfective gastroenteritis and colitis, unspecified     Colitis    Other bursitis of knee, unspecified knee     Knee bursitis    Personal history of other diseases of the respiratory system     History of asthma    Personal history of other endocrine, nutritional and metabolic disease     History of hyperlipidemia       Surgical History  Past Surgical History:   Procedure Laterality Date    BACK SURGERY  11/15/2013    Back Surgery    HERNIA REPAIR  11/15/2013    Hernia Repair    LUNG LOBECTOMY  01/13/2017    Lung Lobectomy    OTHER SURGICAL HISTORY  01/13/2017    Wedge Resection Of Lung    TOTAL HIP ARTHROPLASTY  11/15/2013    Hip Replacement    VASECTOMY  11/15/2013    Surgery Vas Deferens Vasectomy        Social History  He reports that he quit smoking about 59 years ago. His smoking use included cigarettes. He has been exposed to tobacco  "smoke. He has never used smokeless tobacco. He reports that he does not drink alcohol and does not use drugs.    Family History  No family history on file.     Allergies  Pollen extracts, Bactrim [sulfamethoxazole-trimethoprim], Risedronate, and Sulfa (sulfonamide antibiotics)    Review of Systems   All other systems reviewed and are negative.       Physical Exam  Constitutional:       General: He is not in acute distress.     Appearance: Normal appearance.   HENT:      Head: Normocephalic and atraumatic.      Nose: Nose normal.      Mouth/Throat:      Mouth: Mucous membranes are moist.      Pharynx: Oropharynx is clear.   Cardiovascular:      Rate and Rhythm: Normal rate and regular rhythm.   Pulmonary:      Effort: Pulmonary effort is normal. No respiratory distress.      Breath sounds: Normal breath sounds.   Abdominal:      General: Bowel sounds are normal.      Palpations: Abdomen is soft.      Tenderness: There is no abdominal tenderness. There is no rebound.   Musculoskeletal:         General: No swelling, deformity or signs of injury.      Cervical back: Normal range of motion and neck supple.   Skin:     General: Skin is warm and dry.      Findings: Erythema present. No abscess, lesion or wound.          Neurological:      General: No focal deficit present.      Mental Status: He is alert and oriented to person, place, and time. Mental status is at baseline.   Psychiatric:         Attention and Perception: Attention and perception normal.         Mood and Affect: Mood normal.         Behavior: Behavior normal.         Judgment: Judgment normal.          Last Recorded Vitals  Blood pressure 120/65, pulse 79, temperature 37.2 °C (99 °F), temperature source Temporal, height 1.676 m (5' 6\"), weight 59.4 kg (130 lb 15.3 oz), SpO2 100%.    Relevant Results        Results for orders placed or performed during the hospital encounter of 12/09/24 (from the past 24 hours)   B-Type Natriuretic Peptide   Result Value " Ref Range    BNP 1,141 (H) 0 - 99 pg/mL   CBC and Auto Differential   Result Value Ref Range    WBC 7.3 4.4 - 11.3 x10*3/uL    nRBC 0.0 0.0 - 0.0 /100 WBCs    RBC 3.95 (L) 4.50 - 5.90 x10*6/uL    Hemoglobin 11.4 (L) 13.5 - 17.5 g/dL    Hematocrit 34.1 (L) 41.0 - 52.0 %    MCV 86 80 - 100 fL    MCH 28.9 26.0 - 34.0 pg    MCHC 33.4 32.0 - 36.0 g/dL    RDW 15.3 (H) 11.5 - 14.5 %    Platelets 125 (L) 150 - 450 x10*3/uL    Neutrophils % 84.6 40.0 - 80.0 %    Immature Granulocytes %, Automated 0.6 0.0 - 0.9 %    Lymphocytes % 6.9 13.0 - 44.0 %    Monocytes % 7.0 2.0 - 10.0 %    Eosinophils % 0.8 0.0 - 6.0 %    Basophils % 0.1 0.0 - 2.0 %    Neutrophils Absolute 6.15 (H) 1.60 - 5.50 x10*3/uL    Immature Granulocytes Absolute, Automated 0.04 0.00 - 0.50 x10*3/uL    Lymphocytes Absolute 0.50 (L) 0.80 - 3.00 x10*3/uL    Monocytes Absolute 0.51 0.05 - 0.80 x10*3/uL    Eosinophils Absolute 0.06 0.00 - 0.40 x10*3/uL    Basophils Absolute 0.01 0.00 - 0.10 x10*3/uL   Comprehensive metabolic panel   Result Value Ref Range    Glucose 149 (H) 74 - 99 mg/dL    Sodium 134 (L) 136 - 145 mmol/L    Potassium 4.0 3.5 - 5.3 mmol/L    Chloride 104 98 - 107 mmol/L    Bicarbonate 22 21 - 32 mmol/L    Anion Gap 12 10 - 20 mmol/L    Urea Nitrogen 64 (H) 6 - 23 mg/dL    Creatinine 2.10 (H) 0.50 - 1.30 mg/dL    eGFR 31 (L) >60 mL/min/1.73m*2    Calcium 8.7 8.6 - 10.3 mg/dL    Albumin 3.3 (L) 3.4 - 5.0 g/dL    Alkaline Phosphatase 119 33 - 136 U/L    Total Protein 5.8 (L) 6.4 - 8.2 g/dL    AST 33 9 - 39 U/L    Bilirubin, Total 0.8 0.0 - 1.2 mg/dL    ALT 5 (L) 10 - 52 U/L   C-Reactive Protein   Result Value Ref Range    C-Reactive Protein 10.59 (H) <1.00 mg/dL   Sedimentation rate, automated   Result Value Ref Range    Sedimentation Rate 28 (H) 0 - 20 mm/h     XR ankle right 3+ views    Result Date: 12/9/2024  STUDY: Ankle Radiographs;  12/9/2-24, 1358 INDICATION: Swelling. COMPARISON: None Available. ACCESSION NUMBER(S): [id number] ORDERING  CLINICIAN: OSWALDO BENJAMIN TECHNIQUE:  3 view(s) of the right ankle. FINDINGS:  Question avulsion fracture versus ossicles inferior to the lateral malleolus.  Additional possible avulsion fracture or sequela of prior trauma along the medial aspect of the calcaneus.  Degenerative changes of the midfoot.  The alignment is anatomic.  Extensive soft tissue swelling.    Question avulsion fracture versus ossicles inferior to the lateral malleolus. Additional possible avulsion fracture or sequela of prior trauma along the medial aspect of the calcaneus.  Correlate clinically and with point tenderness. Severe soft tissue swelling. Signed by Lenin Ley MD        Assessment/Plan   Assessment & Plan  Cellulitis      RLE cellulitis  Failed outpatient management with Keflex  Will continue IV clindamycin at this time  Pain control  BEKA on CKD  Hold torsemide  Monitor fluid status  Repeat BMP in AM  Atrial fibrillation  Continue Eliquis  Parkinson's disease  Continue Sinemet  Hypothyroidism  Continue Synthroid  BPH  Continue Flomax, finasteride             Rebekah Barakat MD

## 2024-12-10 NOTE — NURSING NOTE
Vitals:    12/10/24 0700   BP: (!) 109/49   Pulse: 79   Resp: 18   Temp: 37.9 °C (100.2 °F)   SpO2:       Patient lying in bed. Vital  acquired. All needs met at this moment. Call light within reach. Bed at lowest position. Continue monitor.

## 2024-12-10 NOTE — PROGRESS NOTES
Physical Therapy Evaluation & Treatment    Patient Name: Brady Ortega  MRN: [id number]  Department: [facility]  Room: [address] [place] [address]  Today's Date: 12/10/2024   Time Calculation  Start Time: 1400  Stop Time: 1442  Time Calculation (min): 42 min    Assessment/Plan   PT Assessment  PT Assessment Results: Decreased strength, Decreased endurance, Decreased range of motion, Impaired balance, Decreased mobility, Impaired tone, Impaired sensation  Rehab Prognosis: Good  Evaluation/Treatment Tolerance: Patient tolerated treatment well  End of Session Communication: Bedside nurse  Assessment Comment: Pt presents with impaired functional mobility d/t RLE cellulitis. Recommend discharge home with 24 hour supervision/intermittent assistance and home health PT. Pt was issued HEP handout. Pt verbalized and demonstrated understanding.   End of Session Patient Position: Up in chair, BLE elevated, ice to surgical site, call light in reach, needs met, spouse present, RN aware.  IP OR SWING BED PT PLAN  Inpatient or Swing Bed: Inpatient  PT Plan  Treatment/Interventions: Bed mobility, Transfer training, Gait training, Stair training, Balance training, Strengthening, Endurance training, Therapeutic activity, Therapeutic exercise, Home exercise program, Positioning, Postural re-education  PT Plan: Ongoing PT  PT Frequency: 4 times per week  PT Discharge Recommendations: Low intensity level of continued care  Equipment Recommended upon Discharge: Wheeled walker, Straight cane  PT Recommended Transfer Status: Contact guard, Assistive device      Subjective     General Visit Information:  General  Reason for Referral: RLE cellulitis  Referred By: Dr. Long  Past Medical History Relevant to Rehab: Afib, Parkinson's, CHF, CKD, asthma, HLD, spine surgery, hernia repair, lung resection  Family/Caregiver Present: Yes (spouse)  Prior to Session Communication: Bedside nurse  Patient Position Received: Up in chair  General Comment: RLE edema and  erythema noted.  Home Living:  Home Living  Type of Home: House  Lives With: Spouse  Home Adaptive Equipment: Walker rolling or standard, Cane (R AFO)  Home Layout: Multi-level, Stairs to alternate level with rails, Bed/bath upstairs  Alternate Level Stairs-Rails: Both  Alternate Level Stairs-Number of Steps: 12  Home Access: Stairs to enter with rails  Entrance Stairs-Rails: Right  Entrance Stairs-Number of Steps: 3  Bathroom Shower/Tub: Walk-in shower  Bathroom Equipment: Grab bars in shower, Shower chair without back  Prior Level of Function:  Prior Function Per Pt/Caregiver Report  Level of [place]: Needs assistance with ADLs, Needs assistance with homemaking  Receives Help From: Family (spouse)  ADL Assistance: Needs assistance (spouse assist with LB dressing and provides supervision for bathing)  Bath: Stand by  Dressing: Minimal  Homemaking Assistance: Needs assistance (spouse completes most)  Ambulatory Assistance: Needs assistance (use of cane vs RW)  Vocational: Retired  Leisure: Goes to yoga and spin class  Prior Function Comments: Pt reports 2 falls recently - one while walking in the grass after getting out of the car; Pt was doing outpatient PT but recently was dc from those services  Precautions:  Precautions  LE Weight Bearing Status: Weight Bearing as Tolerated  Medical Precautions: Fall precautions           Objective   Pain:  Pain Assessment  Pain Assessment: 0-10  0-10 (Numeric) Pain Score: 0 - No pain  Cognition:  Cognition  Overall Cognitive Status: Within Functional Limits  Orientation Level: Oriented X4  Attention: Within Functional Limits  Memory: Within Funtional Limits  Problem Solving: Within Functional Limits  Numeric Reasoning: Within Functional Limits  Abstract Reasoning: Within Functional Limits  Safety/Judgement: Within Functional Limits  Insight: Within function limits    General Assessments:  Activity Tolerance  Endurance: Tolerates 30 min exercise with multiple  rests    Sensation  Light Touch: Not tested    Coordination  Movements are Fluid and Coordinated: Yes    Postural Control  Postural Control: Impaired (sitting WFL'; standing impaired)    Static Sitting Balance  Static Sitting-Balance Support: Feet supported  Static Sitting-Level of Assistance: Independent  Dynamic Sitting Balance  Dynamic Sitting-Balance Support: Feet supported  Dynamic Sitting-Level of Assistance: Distant supervision    Static Standing Balance  Static Standing-Balance Support: Bilateral upper extremity supported (with RW)  Static Standing-Level of Assistance: Contact guard  Dynamic Standing Balance  Dynamic Standing-Balance Support: Bilateral upper extremity supported (with RW)  Dynamic Standing-Level of Assistance: Contact guard  Functional Assessments:  Transfers  Transfer: Yes  Transfer 1  Transfer From 1: Chair with arms to  Transfer to 1: Chair with arms  Technique 1: Sit to stand, Stand to sit  Transfer Device 1: Walker  Transfer Level of Assistance 1: Contact guard, Close supervision (CGA progressing to )  Trials/Comments 1: Pt performed 1 sit<>stand with functional mobility; then patient had patient perform STS x 5 reps in a row.    Ambulation/Gait Training  Ambulation/Gait Training Performed: Yes  Ambulation/Gait Training 1  Surface 1: Level tile  Device 1: Rolling walker  Assistance 1: Contact guard, Minimal verbal cues (cues for upright posture)  Quality of Gait 1: Foot drop/steppage gait, Decreased step length, Inconsistent stride length, Diminished heel strike (decreased rosalina, step to pattern, no LOB noted.)  Comments/Distance (ft) 1: 70 feet x 2  Extremity/Trunk Assessments:  RUE   RUE : Within Functional Limits  LUE   LUE: Within Functional Limits  RLE   RLE : Exceptions to WFL  LLE   LLE : Within Functional Limits    Outcome Measures:  [facility] Basic Mobility  Turning from your back to your side while in a flat bed without using bedrails: A little  Moving from lying on your back  to sitting on the side of a flat bed without using bedrails: A little  Moving to and from bed to chair (including a wheelchair): A little  Standing up from a chair using your arms (e.g. wheelchair or bedside chair): A little  To walk in hospital room: A little  Climbing 3-5 steps with railing: A little  Basic Mobility - Total Score: 18    Encounter Problems       Encounter Problems (Active)       Balance       LTG - Patient will demonstrate Intervention to enhance balance for safe completion of daily activities (Progressing)       Start:  12/10/24            LTG - Patient will maintain balance to allow for safe mobility (Progressing)       Start:  12/10/24               Compromised Skin Integrity       LTG - Patient will maintain/improve skin integrity through proper skin care techniques (Progressing)       Start:  12/10/24               Mobility       LTG - Patient will ambulate community distance with RW at a modified independent level.   (Progressing)       Start:  12/10/24            LTG - Patient will navigate 12 steps with 1 rail with cane at a modified independent level.   (Progressing)       Start:  12/10/24               PT Transfers       LTG - Patient will demonstrate safe transfer techniques with RW at a modified independent level.   (Progressing)       Start:  12/10/24               Pain - Adult          Safety       LTG - Patient will demonstrate safety requirements appropriate to situation/environment (Progressing)       Start:  12/10/24                   Education Documentation  Precautions, taught by Awilda Mancilla, PT at 12/10/2024  2:00 PM.  Learner: Family, Patient  Readiness: Acceptance  Method: Explanation  Response: Verbalizes Understanding    Body Mechanics, taught by Awilda Mancilla, PT at 12/10/2024  2:00 PM.  Learner: Family, Patient  Readiness: Acceptance  Method: Explanation  Response: Verbalizes Understanding    Mobility Training, taught by Awilda Mancilla, PT at 12/10/2024   2:00 PM.  Learner: Family, Patient  Readiness: Acceptance  Method: Explanation  Response: Verbalizes Understanding    Education Comments  No comments found.    Awilda Mancilla, PT, DPT

## 2024-12-10 NOTE — PROGRESS NOTES
79 yr old patient admitted with RLE Cellulitis.   Treatment includes IV antibiotic, Podiatry consult.  Patient has a Hx of Parkinsons and has had has several falls within the last 3-6 mos.  PT to evaluate? Patient in agreement with home physical therapy if recommended.  TCC to continue to follow to determine discharge needs.    12/10/24 1004   Discharge Planning   Living Arrangements Spouse/significant other   Support Systems Spouse/significant other;Children   Assistance Needed none   Type of Residence Private residence   Number of Stairs to Enter Residence 3   Number of Stairs Within Residence 15  (with rail)   Do you have animals or pets at home? No   Who is requesting discharge planning? Provider   Home or Post Acute Services In home services   Type of Home Care Services Home PT   Expected Discharge Disposition Home H   Does the patient need discharge transport arranged? No   Housing Stability   In the last 12 months, was there a time when you were not able to pay the mortgage or rent on time? N   In the past 12 months, how many times have you moved where you were living? 0   At any time in the past 12 months, were you homeless or living in a shelter (including now)? N   Transportation Needs   In the past 12 months, has lack of transportation kept you from medical appointments or from getting medications? no   In the past 12 months, has lack of transportation kept you from meetings, work, or from getting things needed for daily living? No   Patient Choice   Provider Choice list and CMS website (https://medicare.gov/care-compare#search) for post-acute Quality and Resource Measure Data were provided and reviewed with: Patient   Patient / Family choosing to utilize agency / facility established prior to hospitalization Yes   Stroke Family Assessment   Stroke Family Assessment Needed No

## 2024-12-10 NOTE — CARE PLAN
The patient's goals for the shift include pain control.    The clinical goals for the shift include pain control.      Problem: Pain - Adult  Goal: Verbalizes/displays adequate comfort level or baseline comfort level  Outcome: Progressing     Problem: Safety - Adult  Goal: Free from fall injury  Outcome: Progressing     Problem: Discharge Planning  Goal: Discharge to home or other facility with appropriate resources  Outcome: Progressing     Problem: Chronic Conditions and Co-morbidities  Goal: Patient's chronic conditions and co-morbidity symptoms are monitored and maintained or improved  Outcome: Progressing     Problem: Skin  Goal: Decreased wound size/increased tissue granulation at next dressing change  Outcome: Progressing  Goal: Participates in plan/prevention/treatment measures  Outcome: Progressing  Flowsheets (Taken 12/10/2024 0320)  Participates in plan/prevention/treatment measures: Elevate heels  Note: Bilateral foam heel protectors applied  Goal: Prevent/manage excess moisture  Outcome: Progressing  Goal: Prevent/minimize sheer/friction injuries  Outcome: Progressing  Flowsheets (Taken 12/10/2024 0320)  Prevent/minimize sheer/friction injuries: Use pull sheet  Goal: Promote/optimize nutrition  Outcome: Progressing  Goal: Promote skin healing  Outcome: Progressing     Problem: Fall/Injury  Goal: Not fall by end of shift  Outcome: Progressing  Goal: Be free from injury by end of the shift  Outcome: Progressing  Goal: Verbalize understanding of personal risk factors for fall in the hospital  Outcome: Progressing  Goal: Verbalize understanding of risk factor reduction measures to prevent injury from fall in the home  Outcome: Progressing  Goal: Use assistive devices by end of the shift  Outcome: Progressing  Goal: Pace activities to prevent fatigue by end of the shift  Outcome: Progressing

## 2024-12-10 NOTE — NURSING NOTE
Dr. Barakat at bedside.  Patient arrived at 0124 to bed 223 on cart from [facility].  Patient transferred to bed completely dependent with 2 person assist. Patient is alert and oriented x 4.  Patient denies pain.  Lungs are clear to auscultation bilaterally.  RLE is warm red, and swollen from midcalf down.  Skin is intact with no drainage noted. Heels elevated off bed.  Medication list reviewed with patient. Patient oriented to room.  Call light within reach.

## 2024-12-10 NOTE — CARE PLAN
Problem: Balance  Goal: LTG - Patient will demonstrate Intervention to enhance balance for safe completion of daily activities  Outcome: Progressing  Goal: LTG - Patient will maintain balance to allow for safe mobility  Outcome: Progressing     Problem: Mobility  Goal: LTG - Patient will ambulate community distance with RW at a modified independent level.    Outcome: Progressing  Goal: LTG - Patient will navigate 12 steps with 1 rail with cane at a modified independent level.    Outcome: Progressing     Problem: Safety  Goal: LTG - Patient will demonstrate safety requirements appropriate to situation/environment  Outcome: Progressing     Problem: PT Transfers  Goal: LTG - Patient will demonstrate safe transfer techniques with RW at a modified independent level.    Outcome: Progressing     Problem: Compromised Skin Integrity  Goal: LTG - Patient will maintain/improve skin integrity through proper skin care techniques  Outcome: Progressing

## 2024-12-11 LAB
ANION GAP SERPL CALC-SCNC: 12 MMOL/L (ref 10–20)
BUN SERPL-MCNC: 56 MG/DL (ref 6–23)
CALCIUM SERPL-MCNC: 8.6 MG/DL (ref 8.6–10.3)
CHLORIDE SERPL-SCNC: 104 MMOL/L (ref 98–107)
CO2 SERPL-SCNC: 21 MMOL/L (ref 21–32)
CREAT SERPL-MCNC: 1.7 MG/DL (ref 0.5–1.3)
EGFRCR SERPLBLD CKD-EPI 2021: 40 ML/MIN/1.73M*2
ERYTHROCYTE [DISTWIDTH] IN BLOOD BY AUTOMATED COUNT: 15.2 % (ref 11.5–14.5)
GLUCOSE SERPL-MCNC: 112 MG/DL (ref 74–99)
HCT VFR BLD AUTO: 33.1 % (ref 41–52)
HGB BLD-MCNC: 10.7 G/DL (ref 13.5–17.5)
MCH RBC QN AUTO: 27.8 PG (ref 26–34)
MCHC RBC AUTO-ENTMCNC: 32.3 G/DL (ref 32–36)
MCV RBC AUTO: 86 FL (ref 80–100)
NRBC BLD-RTO: ABNORMAL /100{WBCS}
PLATELET # BLD AUTO: 104 X10*3/UL (ref 150–450)
POTASSIUM SERPL-SCNC: 4.4 MMOL/L (ref 3.5–5.3)
RBC # BLD AUTO: 3.85 X10*6/UL (ref 4.5–5.9)
SODIUM SERPL-SCNC: 133 MMOL/L (ref 136–145)
WBC # BLD AUTO: 6.9 X10*3/UL (ref 4.4–11.3)

## 2024-12-11 PROCEDURE — 2500000001 HC RX 250 WO HCPCS SELF ADMINISTERED DRUGS (ALT 637 FOR MEDICARE OP): Performed by: PHARMACIST

## 2024-12-11 PROCEDURE — 2500000001 HC RX 250 WO HCPCS SELF ADMINISTERED DRUGS (ALT 637 FOR MEDICARE OP): Performed by: EMERGENCY MEDICINE

## 2024-12-11 PROCEDURE — 85027 COMPLETE CBC AUTOMATED: CPT | Performed by: STUDENT IN AN ORGANIZED HEALTH CARE EDUCATION/TRAINING PROGRAM

## 2024-12-11 PROCEDURE — 99233 SBSQ HOSP IP/OBS HIGH 50: CPT | Performed by: EMERGENCY MEDICINE

## 2024-12-11 PROCEDURE — 2500000001 HC RX 250 WO HCPCS SELF ADMINISTERED DRUGS (ALT 637 FOR MEDICARE OP): Performed by: STUDENT IN AN ORGANIZED HEALTH CARE EDUCATION/TRAINING PROGRAM

## 2024-12-11 PROCEDURE — 80048 BASIC METABOLIC PNL TOTAL CA: CPT | Performed by: STUDENT IN AN ORGANIZED HEALTH CARE EDUCATION/TRAINING PROGRAM

## 2024-12-11 PROCEDURE — 1100000001 HC PRIVATE ROOM DAILY

## 2024-12-11 PROCEDURE — 2500000002 HC RX 250 W HCPCS SELF ADMINISTERED DRUGS (ALT 637 FOR MEDICARE OP, ALT 636 FOR OP/ED): Performed by: PHARMACIST

## 2024-12-11 PROCEDURE — 2500000004 HC RX 250 GENERAL PHARMACY W/ HCPCS (ALT 636 FOR OP/ED): Mod: JZ | Performed by: STUDENT IN AN ORGANIZED HEALTH CARE EDUCATION/TRAINING PROGRAM

## 2024-12-11 PROCEDURE — 36415 COLL VENOUS BLD VENIPUNCTURE: CPT | Performed by: STUDENT IN AN ORGANIZED HEALTH CARE EDUCATION/TRAINING PROGRAM

## 2024-12-11 PROCEDURE — 2500000002 HC RX 250 W HCPCS SELF ADMINISTERED DRUGS (ALT 637 FOR MEDICARE OP, ALT 636 FOR OP/ED): Performed by: STUDENT IN AN ORGANIZED HEALTH CARE EDUCATION/TRAINING PROGRAM

## 2024-12-11 ASSESSMENT — COGNITIVE AND FUNCTIONAL STATUS - GENERAL
TURNING FROM BACK TO SIDE WHILE IN FLAT BAD: A LITTLE
WALKING IN HOSPITAL ROOM: A LITTLE
CLIMB 3 TO 5 STEPS WITH RAILING: A LITTLE
HELP NEEDED FOR BATHING: A LITTLE
DRESSING REGULAR LOWER BODY CLOTHING: A LITTLE
TOILETING: A LITTLE
MOVING FROM LYING ON BACK TO SITTING ON SIDE OF FLAT BED WITH BEDRAILS: A LITTLE
MOVING TO AND FROM BED TO CHAIR: A LITTLE
WALKING IN HOSPITAL ROOM: A LITTLE
DRESSING REGULAR LOWER BODY CLOTHING: A LITTLE
MOBILITY SCORE: 18
MOVING TO AND FROM BED TO CHAIR: A LITTLE
DAILY ACTIVITIY SCORE: 19
HELP NEEDED FOR BATHING: A LITTLE
PERSONAL GROOMING: A LITTLE
DRESSING REGULAR UPPER BODY CLOTHING: A LITTLE
STANDING UP FROM CHAIR USING ARMS: A LITTLE
CLIMB 3 TO 5 STEPS WITH RAILING: A LITTLE
TOILETING: A LITTLE
DRESSING REGULAR UPPER BODY CLOTHING: A LITTLE
PERSONAL GROOMING: A LITTLE
MOBILITY SCORE: 18
MOVING FROM LYING ON BACK TO SITTING ON SIDE OF FLAT BED WITH BEDRAILS: A LITTLE
TURNING FROM BACK TO SIDE WHILE IN FLAT BAD: A LITTLE
DAILY ACTIVITIY SCORE: 19
STANDING UP FROM CHAIR USING ARMS: A LITTLE

## 2024-12-11 ASSESSMENT — PAIN SCALES - GENERAL
PAINLEVEL_OUTOF10: 0 - NO PAIN
PAINLEVEL_OUTOF10: 6
PAINLEVEL_OUTOF10: 3
PAINLEVEL_OUTOF10: 8

## 2024-12-11 ASSESSMENT — PAIN - FUNCTIONAL ASSESSMENT
PAIN_FUNCTIONAL_ASSESSMENT: 0-10
PAIN_FUNCTIONAL_ASSESSMENT: 0-10

## 2024-12-11 ASSESSMENT — PAIN DESCRIPTION - DESCRIPTORS
DESCRIPTORS: ACHING

## 2024-12-11 NOTE — NURSING NOTE
Nurse to nurse report received from QUINTEN Calix.  Assumed care of patient.  Patient sleeping in bed, HOB elevated.  Patient easily awakened to voice.  Patient is alert and oriented x 4.  Patient denies pain.  Goals discussed for shift.  Wghite board updated.  Call light and possessions within reach.  Bed alarm on.

## 2024-12-11 NOTE — PROGRESS NOTES
"Brady Ortega is a 79 y.o. male on day 1 of admission presenting with Cellulitis.    Subjective   Patient remains afebrile  Cellulitic leg is improving       Objective     Physical Exam  Patient is alert in no acute distress  HEENT: Had small nosebleed today controlled; if reoccurs will hold 1 dose of Eliquis  Lungs are clear to auscultation and percussion  Cardiac is regular rate and rhythm normal S1-S2 without murmur gallop rub click S3 or S4  Abdomen soft nontender positive bowel sounds there is no hepatosplenomegaly or CVA tenderness appreciated  Extremities:  Left leg without cyanosis clubbing erythema or edema  Right leg with cellulitic changes lower leg anterior improving extremity is warm there is no edema and pulses are intact  Last Recorded Vitals  Blood pressure 106/61, pulse 71, temperature 37 °C (98.6 °F), temperature source Temporal, resp. rate 16, height 1.676 m (5' 6\"), weight 59.4 kg (130 lb 15.3 oz), SpO2 98%.  Intake/Output last 3 Shifts:  I/O last 3 completed shifts:  In: 200 (3.4 mL/kg) [IV Piggyback:200]  Out: 1733 (29.2 mL/kg) [Urine:1733 (0.8 mL/kg/hr)]  Weight: 59.4 kg     Relevant Results  Scheduled medications  apixaban, 2.5 mg, oral, BID  atorvastatin, 20 mg, oral, Daily  [Held by provider] azelastine, 2 spray, Each Nostril, BID  carbidopa-levodopa, 2 tablet, oral, BID  carbidopa-levodopa, 2.5 tablet, oral, BID  cholecalciferol, 1,000 Units, oral, Daily  cinacalcet, 30 mg, oral, q48h  clindamycin, 600 mg, intravenous, q8h  famotidine, 10 mg, oral, Nightly  finasteride, 5 mg, oral, Nightly  gabapentin, 100 mg, oral, BID  [Held by provider] ipratropium, 2 spray, Each Nostril, BID  levothyroxine, 88 mcg, oral, Daily before breakfast  polyethylene glycol, 17 g, oral, Daily  tafamidis, 61 mg, oral, Nightly  tamsulosin, 0.4 mg, oral, Nightly  torsemide, 40 mg, oral, Daily      Continuous medications     PRN medications  PRN medications: acetaminophen, benzocaine-menthol, diclofenac sodium, " docusate sodium, HYDROcodone-acetaminophen, lubricating eye drops, melatonin, ondansetron, simethicone, sodium chloride    Results for orders placed or performed during the hospital encounter of 12/10/24 (from the past 24 hours)   Basic Metabolic Panel   Result Value Ref Range    Glucose 112 (H) 74 - 99 mg/dL    Sodium 133 (L) 136 - 145 mmol/L    Potassium 4.4 3.5 - 5.3 mmol/L    Chloride 104 98 - 107 mmol/L    Bicarbonate 21 21 - 32 mmol/L    Anion Gap 12 10 - 20 mmol/L    Urea Nitrogen 56 (H) 6 - 23 mg/dL    Creatinine 1.70 (H) 0.50 - 1.30 mg/dL    eGFR 40 (L) >60 mL/min/1.73m*2    Calcium 8.6 8.6 - 10.3 mg/dL   CBC   Result Value Ref Range    WBC 6.9 4.4 - 11.3 x10*3/uL    nRBC      RBC 3.85 (L) 4.50 - 5.90 x10*6/uL    Hemoglobin 10.7 (L) 13.5 - 17.5 g/dL    Hematocrit 33.1 (L) 41.0 - 52.0 %    MCV 86 80 - 100 fL    MCH 27.8 26.0 - 34.0 pg    MCHC 32.3 32.0 - 36.0 g/dL    RDW 15.2 (H) 11.5 - 14.5 %    Platelets 104 (L) 150 - 450 x10*3/uL                       Assessment/Plan   Assessment & Plan      RLE cellulitis  Failed outpatient management with Keflex  Will continue IV clindamycin at this time  Pain control  12/11: Leg has improved dramatically today will continue on IV antibiotics and consider discharge on p.o.  BEKA on CKD  Hold torsemide  Monitor fluid status  Repeat BMP in AM  12/11: Will restart torsemide since patient has low ejection fraction and history of CHF  Atrial fibrillation  Continue Eliquis  Parkinson's disease  Continue Sinemet  Hypothyroidism  Continue Synthroid  BPH  Continue Flomax, finasteride       I spent 50 minutes in the professional and overall care of this patient.      Charity Long MD

## 2024-12-11 NOTE — NURSING NOTE
Called to patient room per family for patient having a bloody nose.Small amount of blood noted per nursing staff.DR. Long notified.Will hold evening dose of eliquis and order nasal spray.

## 2024-12-11 NOTE — CARE PLAN
The patient's goals for the shift include pain control    The clinical goals for the shift include pain control and safety

## 2024-12-11 NOTE — PROGRESS NOTES
Patient sleeping at the time of rounds, chart reviewed.  Vital signs are stable.  Nursing notes no issues.                Rebekah Barakat MD

## 2024-12-11 NOTE — CARE PLAN
The patient's goals for the shift include pain control    The clinical goals for the shift include Pain management and safety.

## 2024-12-11 NOTE — NURSING NOTE
Patient awake in bed watching TV.  Vitals are stable.  Patient repositioned in bed.  Labs drawn and sent. IV infusing.  Call light and possessions within reach.  Bed alarm on.  Patient voices no concerns at this time.

## 2024-12-11 NOTE — CARE PLAN
The patient's goals for the shift include pain control.    The clinical goals for the shift include pain control and safety.    Problem: Fall/Injury  Goal: Not fall by end of shift  Outcome: Progressing     Problem: Skin  Goal: Participates in plan/prevention/treatment measures  Outcome: Progressing  Flowsheets (Taken 12/11/2024 0216)  Participates in plan/prevention/treatment measures: Elevate heels  Note: Bilateral heel protectors     Problem: Pain  Goal: Walks with improved pain control throughout the shift  Outcome: Progressing       Problem: Pain - Adult  Goal: Verbalizes/displays adequate comfort level or baseline comfort level  Outcome: Progressing

## 2024-12-11 NOTE — CARE PLAN
79 yr old patient admitted yesterday with RLE Cellulitis.  Plan to continue IV clindamycin and control pain.  Therapy recommending home PT. Discussed with patient and in agreement with  Home Care. Referral was sent, waiting on home care referral to be placed. Requested HC order to Dr. Long.  TCC to follow up in the am.  Plan is for patient to transition home with [facility] PT when medically cleared. SOC pending.

## 2024-12-11 NOTE — NURSING NOTE
Rounded on patient. This is a pleasant gentleman who is happy with his care so far. We spoke about the history of his cellulitis in his right leg. He did express to me that he was having pain 6/10 in his leg that is aching and stabbing from time to time. I followed up with Nery ALBERTO RN in regard to his pain. He asked for Tylenol so I did administer his 2100 medications and his PRN Tylenol. He tolerated the medications without difficulty swallowing but he did ask that I pour his meds 2 at a time in his mouth. Of note he has some tremors of his hands with contracture of the hands bilaterally. He can ur=se his hands but has difficulty grabbing small items like a pill  cup. He is also a bit hard of hearing and can be mildly confused . He thought it was 830 AM but I was able to reorient him to the fact that it is actually 830 PM. He mentioned to me that he was incontinent of stool today and that the staff was kind enough to help him get cleaned up. He was grateful. He is close to the nurses station and his bed alarm is on.

## 2024-12-12 LAB
ANION GAP SERPL CALC-SCNC: 13 MMOL/L (ref 10–20)
BUN SERPL-MCNC: 55 MG/DL (ref 6–23)
CALCIUM SERPL-MCNC: 8.8 MG/DL (ref 8.6–10.3)
CHLORIDE SERPL-SCNC: 103 MMOL/L (ref 98–107)
CO2 SERPL-SCNC: 21 MMOL/L (ref 21–32)
CREAT SERPL-MCNC: 1.71 MG/DL (ref 0.5–1.3)
EGFRCR SERPLBLD CKD-EPI 2021: 40 ML/MIN/1.73M*2
ERYTHROCYTE [DISTWIDTH] IN BLOOD BY AUTOMATED COUNT: 15.3 % (ref 11.5–14.5)
GLUCOSE SERPL-MCNC: 108 MG/DL (ref 74–99)
HCT VFR BLD AUTO: 33.8 % (ref 41–52)
HGB BLD-MCNC: 10.8 G/DL (ref 13.5–17.5)
MCH RBC QN AUTO: 27.6 PG (ref 26–34)
MCHC RBC AUTO-ENTMCNC: 32 G/DL (ref 32–36)
MCV RBC AUTO: 86 FL (ref 80–100)
NRBC BLD-RTO: ABNORMAL /100{WBCS}
PLATELET # BLD AUTO: 121 X10*3/UL (ref 150–450)
POTASSIUM SERPL-SCNC: 4.3 MMOL/L (ref 3.5–5.3)
RBC # BLD AUTO: 3.92 X10*6/UL (ref 4.5–5.9)
SODIUM SERPL-SCNC: 133 MMOL/L (ref 136–145)
WBC # BLD AUTO: 6.1 X10*3/UL (ref 4.4–11.3)

## 2024-12-12 PROCEDURE — 2500000004 HC RX 250 GENERAL PHARMACY W/ HCPCS (ALT 636 FOR OP/ED): Performed by: EMERGENCY MEDICINE

## 2024-12-12 PROCEDURE — 1100000001 HC PRIVATE ROOM DAILY

## 2024-12-12 PROCEDURE — 2500000001 HC RX 250 WO HCPCS SELF ADMINISTERED DRUGS (ALT 637 FOR MEDICARE OP): Performed by: EMERGENCY MEDICINE

## 2024-12-12 PROCEDURE — 99232 SBSQ HOSP IP/OBS MODERATE 35: CPT | Performed by: EMERGENCY MEDICINE

## 2024-12-12 PROCEDURE — 2500000004 HC RX 250 GENERAL PHARMACY W/ HCPCS (ALT 636 FOR OP/ED): Mod: JZ | Performed by: STUDENT IN AN ORGANIZED HEALTH CARE EDUCATION/TRAINING PROGRAM

## 2024-12-12 PROCEDURE — 97116 GAIT TRAINING THERAPY: CPT | Mod: GP

## 2024-12-12 PROCEDURE — 36415 COLL VENOUS BLD VENIPUNCTURE: CPT | Performed by: STUDENT IN AN ORGANIZED HEALTH CARE EDUCATION/TRAINING PROGRAM

## 2024-12-12 PROCEDURE — 97530 THERAPEUTIC ACTIVITIES: CPT | Mod: GP

## 2024-12-12 PROCEDURE — 80048 BASIC METABOLIC PNL TOTAL CA: CPT | Performed by: STUDENT IN AN ORGANIZED HEALTH CARE EDUCATION/TRAINING PROGRAM

## 2024-12-12 PROCEDURE — 2500000002 HC RX 250 W HCPCS SELF ADMINISTERED DRUGS (ALT 637 FOR MEDICARE OP, ALT 636 FOR OP/ED): Performed by: STUDENT IN AN ORGANIZED HEALTH CARE EDUCATION/TRAINING PROGRAM

## 2024-12-12 PROCEDURE — 2500000001 HC RX 250 WO HCPCS SELF ADMINISTERED DRUGS (ALT 637 FOR MEDICARE OP): Performed by: STUDENT IN AN ORGANIZED HEALTH CARE EDUCATION/TRAINING PROGRAM

## 2024-12-12 PROCEDURE — 2500000002 HC RX 250 W HCPCS SELF ADMINISTERED DRUGS (ALT 637 FOR MEDICARE OP, ALT 636 FOR OP/ED): Performed by: EMERGENCY MEDICINE

## 2024-12-12 PROCEDURE — 85027 COMPLETE CBC AUTOMATED: CPT | Performed by: STUDENT IN AN ORGANIZED HEALTH CARE EDUCATION/TRAINING PROGRAM

## 2024-12-12 PROCEDURE — 2500000001 HC RX 250 WO HCPCS SELF ADMINISTERED DRUGS (ALT 637 FOR MEDICARE OP): Performed by: PHARMACIST

## 2024-12-12 RX ORDER — CLOTRIMAZOLE AND BETAMETHASONE DIPROPIONATE 10; .64 MG/G; MG/G
1 CREAM TOPICAL 2 TIMES DAILY
Status: DISCONTINUED | OUTPATIENT
Start: 2024-12-12 | End: 2024-12-12

## 2024-12-12 RX ORDER — PREDNISONE 20 MG/1
20 TABLET ORAL DAILY
Status: DISCONTINUED | OUTPATIENT
Start: 2024-12-12 | End: 2024-12-13 | Stop reason: HOSPADM

## 2024-12-12 RX ORDER — NYSTATIN 100000 [USP'U]/G
1 POWDER TOPICAL 2 TIMES DAILY
Status: DISCONTINUED | OUTPATIENT
Start: 2024-12-12 | End: 2024-12-13 | Stop reason: HOSPADM

## 2024-12-12 RX ORDER — ALLOPURINOL 100 MG/1
100 TABLET ORAL DAILY
Status: DISCONTINUED | OUTPATIENT
Start: 2024-12-12 | End: 2024-12-13 | Stop reason: HOSPADM

## 2024-12-12 RX ORDER — HYDROCODONE BITARTRATE AND ACETAMINOPHEN 5; 325 MG/1; MG/1
1 TABLET ORAL EVERY 6 HOURS PRN
Qty: 20 TABLET | Refills: 0 | Status: SHIPPED | OUTPATIENT
Start: 2024-12-12 | End: 2024-12-17

## 2024-12-12 RX ORDER — BETAMETHASONE DIPROPIONATE 0.5 MG/G
CREAM TOPICAL 2 TIMES DAILY
Status: DISCONTINUED | OUTPATIENT
Start: 2024-12-12 | End: 2024-12-13 | Stop reason: HOSPADM

## 2024-12-12 RX ORDER — CLINDAMYCIN HYDROCHLORIDE 300 MG/1
300 CAPSULE ORAL 4 TIMES DAILY
Qty: 28 CAPSULE | Refills: 0 | Status: SHIPPED | OUTPATIENT
Start: 2024-12-12 | End: 2024-12-19

## 2024-12-12 ASSESSMENT — COGNITIVE AND FUNCTIONAL STATUS - GENERAL
WALKING IN HOSPITAL ROOM: A LITTLE
HELP NEEDED FOR BATHING: A LITTLE
MOBILITY SCORE: 18
STANDING UP FROM CHAIR USING ARMS: A LITTLE
MOVING TO AND FROM BED TO CHAIR: A LITTLE
CLIMB 3 TO 5 STEPS WITH RAILING: A LITTLE
CLIMB 3 TO 5 STEPS WITH RAILING: A LITTLE
TURNING FROM BACK TO SIDE WHILE IN FLAT BAD: A LITTLE
MOVING FROM LYING ON BACK TO SITTING ON SIDE OF FLAT BED WITH BEDRAILS: A LITTLE
DRESSING REGULAR LOWER BODY CLOTHING: A LITTLE
MOVING TO AND FROM BED TO CHAIR: A LITTLE
DRESSING REGULAR UPPER BODY CLOTHING: A LITTLE
PERSONAL GROOMING: A LITTLE
TOILETING: A LITTLE
STANDING UP FROM CHAIR USING ARMS: A LITTLE
DAILY ACTIVITIY SCORE: 19
DAILY ACTIVITIY SCORE: 19
WALKING IN HOSPITAL ROOM: A LITTLE
STANDING UP FROM CHAIR USING ARMS: A LITTLE
TOILETING: A LITTLE
MOBILITY SCORE: 20
CLIMB 3 TO 5 STEPS WITH RAILING: A LITTLE
PERSONAL GROOMING: A LITTLE
WALKING IN HOSPITAL ROOM: A LITTLE
MOBILITY SCORE: 19
TURNING FROM BACK TO SIDE WHILE IN FLAT BAD: A LITTLE
MOVING TO AND FROM BED TO CHAIR: A LITTLE
DRESSING REGULAR UPPER BODY CLOTHING: A LITTLE
DRESSING REGULAR LOWER BODY CLOTHING: A LITTLE
HELP NEEDED FOR BATHING: A LITTLE

## 2024-12-12 ASSESSMENT — PAIN SCALES - GENERAL
PAINLEVEL_OUTOF10: 10 - WORST POSSIBLE PAIN
PAINLEVEL_OUTOF10: 1
PAINLEVEL_OUTOF10: 4
PAINLEVEL_OUTOF10: 4
PAINLEVEL_OUTOF10: 0 - NO PAIN
PAINLEVEL_OUTOF10: 8
PAINLEVEL_OUTOF10: 3
PAINLEVEL_OUTOF10: 5 - MODERATE PAIN

## 2024-12-12 ASSESSMENT — PAIN - FUNCTIONAL ASSESSMENT
PAIN_FUNCTIONAL_ASSESSMENT: 0-10

## 2024-12-12 ASSESSMENT — PAIN DESCRIPTION - DESCRIPTORS
DESCRIPTORS: DULL
DESCRIPTORS: DULL
DESCRIPTORS: ACHING
DESCRIPTORS: ACHING
DESCRIPTORS: DULL
DESCRIPTORS: ACHING

## 2024-12-12 NOTE — CARE PLAN
The patient's goals for the shift include pain control    The clinical goals for the shift include Pain management and safety.    Over the shift, the patient did not make progress toward the following goals. Barriers to progression include ***. Recommendations to address these barriers include ***.

## 2024-12-12 NOTE — PROGRESS NOTES
Physical Therapy Treatment    Patient Name: Brady Ortega  MRN: [id number]  Department: [facility]  Room: [address] [place] [address]  Today's Date: 12/12/2024  Time Calculation  Start Time: 1339  Stop Time: 1438  Time Calculation (min): 59 min         Assessment/Plan   PT Assessment  PT Assessment Results: Decreased strength, Decreased endurance, Decreased range of motion, Impaired balance, Decreased mobility, Impaired tone, Impaired sensation  Rehab Prognosis: Good  Barriers to Discharge Home: No anticipated barriers  Evaluation/Treatment Tolerance: Patient tolerated treatment well  End of Session Communication: Bedside nurse  Assessment Comment: Pt ambulated community distance this date with RW. PT discussed use of RW at home upon discharge, pt and spouse are agreeable. Pt able to negotiate stairs in preparation for home going. Continue to recommend discharge home with 24 hour supervision and HHPT to assist patient in returning to OF.  End of Session Patient Position: Bed, 3 rail up, Alarm on, BLE elevated, spouse present, RN aware. Call light in reach.  PT Plan  Inpatient/Swing Bed or Outpatient: Inpatient  PT Plan  Treatment/Interventions: Bed mobility, Transfer training, Gait training, Stair training, Balance training, Strengthening, Endurance training, Therapeutic exercise, Therapeutic activity, Positioning, Postural re-education, Home exercise program, Range of motion  PT Plan: Ongoing PT  PT Frequency: 4 times per week  PT Discharge Recommendations: Low intensity level of continued care  Equipment Recommended upon Discharge: Wheeled walker, Straight cane  PT Recommended Transfer Status: Stand by assist, Assistive device  PT - OK to Discharge: Yes      General Visit Information:   PT  Visit  PT Received On: 12/12/24  Response to Previous Treatment: Patient with no complaints from previous session. (Pt reports pain is a bit higher today and he is unsure about his dc.)  General  Family/Caregiver Present: Yes (spouse)  Prior to  Session Communication: Bedside nurse  Patient Position Received: Bed, 3 rail up, Alarm on  General Comment: RLE edema and erythema noted.    Subjective   Precautions:  Precautions  LE Weight Bearing Status: Weight Bearing as Tolerated  Medical Precautions: Fall precautions    Objective   Pain:  Pain Assessment  Pain Assessment: 0-10  0-10 (Numeric) Pain Score: 5 - Moderate pain  Pain Type: Acute pain  Pain Location: Leg  Pain Orientation: Right  Pain Interventions: Elevated, Repositioned    Activity Tolerance:  Activity Tolerance  Endurance: Tolerates 30+ min exercise without fatigue  Treatments:  Therapeutic Activity  Therapeutic Activity Performed: Yes  Therapeutic Activity 1: PT assisted patient into the restroom. PT assisted with clothing management, for patient uses elastic briefs at home, but here he was wearing our hopsital briefs with velcro tabs. Supervision to stand at toilet to urinate and supervision for hand washing. Increased time to complete all tasks.    Bed Mobility  Bed Mobility: Yes  Bed Mobility 1  Bed Mobility 1: Supine to sitting, Sitting to supine  Level of Assistance 1: Close supervision (increased time and effort to complete.)    Ambulation/Gait Training  Ambulation/Gait Training Performed: Yes  Ambulation/Gait Training 1  Surface 1: Level tile  Device 1: Rolling walker  Assistance 1: Close supervision, Minimal verbal cues (cues for upright posture)  Quality of Gait 1: Foot drop/steppage gait, Decreased step length, Inconsistent stride length, Diminished heel strike (decreased rosalina, step to pattern, no LOB noted.)  Comments/Distance (ft) 1: 150 feet x 1    Transfers  Transfer: Yes  Transfer 1  Transfer From 1: Bed to, Wheelchair to  Transfer to 1: Wheelchair, Bed  Technique 1: Sit to stand, Stand to sit  Transfer Device 1: Walker  Transfer Level of Assistance 1: Close supervision (good hand placement)    Stairs  Stairs: Yes  Stairs  Rails 1: Right  Device 1: Single point cane  Assistance  1: Close supervision  Comment/Number of Steps 1: 6  Pt demonstrated step to pattern, decreased rosalina, no LOB noted.     Outcome Measures:  [facility] Basic Mobility  Turning from your back to your side while in a flat bed without using bedrails: None  Moving from lying on your back to sitting on the side of a flat bed without using bedrails: None  Moving to and from bed to chair (including a wheelchair): A little  Standing up from a chair using your arms (e.g. wheelchair or bedside chair): A little  To walk in hospital room: A little  Climbing 3-5 steps with railing: A little  Basic Mobility - Total Score: 20      Encounter Problems       Encounter Problems (Active)       Balance       LTG - Patient will demonstrate Intervention to enhance balance for safe completion of daily activities (Progressing)       Start:  12/10/24            LTG - Patient will maintain balance to allow for safe mobility (Progressing)       Start:  12/10/24               Compromised Skin Integrity       LTG - Patient will maintain/improve skin integrity through proper skin care techniques (Progressing)       Start:  12/10/24               Mobility       LTG - Patient will ambulate community distance with RW at a modified independent level.   (Progressing)       Start:  12/10/24            LTG - Patient will navigate 12 steps with 1 rail with cane at a modified independent level.   (Progressing)       Start:  12/10/24               PT Transfers       LTG - Patient will demonstrate safe transfer techniques with RW at a modified independent level.   (Progressing)       Start:  12/10/24               Pain - Adult          Safety       LTG - Patient will demonstrate safety requirements appropriate to situation/environment (Progressing)       Start:  12/10/24                 Awilda Mancilla, PT, DPT

## 2024-12-12 NOTE — NURSING NOTE
"Pt states pain in right lower extremity is 10/10. Pt medicated with PRN tylenol at this time per request. Pt asked if he would like medication more appropriate for his pain rating but expressed he doesn't \"do well\" with narcotics. No further needs at this time.   "

## 2024-12-12 NOTE — CARE PLAN
The patient's goals for the shift include pain control    The clinical goals for the shift include mange pain

## 2024-12-12 NOTE — NURSING NOTE
Received report from Corazon Meyer. Pt was in bed sleeping. Went in to write name on board. Bed lock and low with bed alarm on. Call light within reach.

## 2024-12-12 NOTE — PROGRESS NOTES
"Brady Ortega is a 79 y.o. male on day 2 admitted with cellulitis of the right leg    Physical Exam  Patient is alert in no acute distress  Lungs are clear to auscultation and percussion  Cardiac is regular rate and rhythm normal S1-S2 without murmur gallop rub click S3 or S4  Abdomen is soft nontender positive bowel sounds there is no hepatosplenomegaly or CVA tenderness appreciated  Extremities:  Right tibia is still erythematous but not as red no longer warm to touch  Dorsal surface of right foot is more erythematous slight edema and tender to palpation  Last Recorded Vitals  Blood pressure 112/71, pulse 70, temperature 37 °C (98.6 °F), temperature source Temporal, resp. rate 16, height 1.676 m (5' 6\"), weight 59.4 kg (130 lb 15.3 oz), SpO2 97%.  Intake/Output last 3 Shifts:  I/O last 3 completed shifts:  In: 780 (13.1 mL/kg) [P.O.:480; IV Piggyback:300]  Out: 1850 (31.1 mL/kg) [Urine:1850 (0.9 mL/kg/hr)]  Weight: 59.4 kg     Relevant Results    Scheduled medications  allopurinol, 100 mg, oral, Daily  apixaban, 2.5 mg, oral, BID  atorvastatin, 20 mg, oral, Daily  [Held by provider] azelastine, 2 spray, Each Nostril, BID  carbidopa-levodopa, 2 tablet, oral, BID  carbidopa-levodopa, 2.5 tablet, oral, BID  cholecalciferol, 1,000 Units, oral, Daily  cinacalcet, 30 mg, oral, q48h  clindamycin, 600 mg, intravenous, q8h  famotidine, 10 mg, oral, Nightly  finasteride, 5 mg, oral, Nightly  gabapentin, 100 mg, oral, BID  [Held by provider] ipratropium, 2 spray, Each Nostril, BID  levothyroxine, 88 mcg, oral, Daily before breakfast  polyethylene glycol, 17 g, oral, Daily  predniSONE, 20 mg, oral, Daily  tafamidis, 61 mg, oral, Nightly  tamsulosin, 0.4 mg, oral, Nightly  torsemide, 40 mg, oral, Daily      Continuous medications     PRN medications  PRN medications: acetaminophen, benzocaine-menthol, diclofenac sodium, docusate sodium, HYDROcodone-acetaminophen, lubricating eye drops, melatonin, ondansetron, simethicone, " sodium chloride    Results for orders placed or performed during the hospital encounter of 12/10/24 (from the past 24 hours)   Basic Metabolic Panel   Result Value Ref Range    Glucose 108 (H) 74 - 99 mg/dL    Sodium 133 (L) 136 - 145 mmol/L    Potassium 4.3 3.5 - 5.3 mmol/L    Chloride 103 98 - 107 mmol/L    Bicarbonate 21 21 - 32 mmol/L    Anion Gap 13 10 - 20 mmol/L    Urea Nitrogen 55 (H) 6 - 23 mg/dL    Creatinine 1.71 (H) 0.50 - 1.30 mg/dL    eGFR 40 (L) >60 mL/min/1.73m*2    Calcium 8.8 8.6 - 10.3 mg/dL   CBC   Result Value Ref Range    WBC 6.1 4.4 - 11.3 x10*3/uL    nRBC      RBC 3.92 (L) 4.50 - 5.90 x10*6/uL    Hemoglobin 10.8 (L) 13.5 - 17.5 g/dL    Hematocrit 33.8 (L) 41.0 - 52.0 %    MCV 86 80 - 100 fL    MCH 27.6 26.0 - 34.0 pg    MCHC 32.0 32.0 - 36.0 g/dL    RDW 15.3 (H) 11.5 - 14.5 %    Platelets 121 (L) 150 - 450 x10*3/uL                  Assessment/Plan   Assessment & Plan      RLE cellulitis  Failed outpatient management with Keflex  Will continue IV clindamycin at this time  Pain control  12/11: Leg has improved dramatically today will continue on IV antibiotics and consider discharge on p.o.  12/12:  Right tibia continues to improve however dorsal surface of right foot is erythematous and tender to touch with slight edema; patient complaining of increased pain at that site  Suspect is multifactorial he is sprained his ankle additionally he has gout  -Will begin allopurinol 100 mg daily along with prednisone 20 mg daily  BEKA on CKD  Hold torsemide  Monitor fluid status  Repeat BMP in AM  12/11: Will restart torsemide since patient has low ejection fraction and history of CHF             12/12: Remains stable  Atrial fibrillation  Continue Eliquis  Parkinson's disease  Continue Sinemet  Hypothyroidism  Continue Synthroid  BPH  Continue Flomax, finasteride       I spent 60 minutes in the professional and overall care of this patient.      Charity Long MD

## 2024-12-12 NOTE — CARE PLAN
Pt admitted inpatient for RLE Cellulitis.  Continuing IV clindamycin and Pain control.  RLE cellulitis worsening today.   Will remain in house overnight.  Plan is home with [facility] PT when medically cleared. SOC confirmation pending discharge and home care referral.  TCC to continue to follow.       Patient/Caregiver requests family/friend to interpret.

## 2024-12-12 NOTE — NURSING NOTE
Pt. Vital signs obtained.  Pt. Order breakfast.  Pt. Ambulated to the bathroom and to the chair.  Call light within reach.  Chair alarm on.

## 2024-12-12 NOTE — NURSING NOTE
Assumed care of pt from night shift, RN. Pt resting in recliner with call light in reach. 2+ pulses and cap refill brisk. Bilaterally lower extremity edema noted. Limbs elevated. Lung sounds clear bilaterally. No stated needs.

## 2024-12-12 NOTE — NURSING NOTE
External catheter place on the pt.  Pt. Eating dinner.  Call light within reach.  Bed alarm on.   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  PLEX 10/26, 10/27, 10/29, 10/30, 11/1, 11/2, 11/3 (prior to Rituxan)  Rituximab 375 mg/m^2 (600 mg) on 10/27 11/3, 11/10 and 11/17 (completed 4 doses)  Cyclophosphamide 7.5 mg/kg on 10/27 and 11/10 ( every 14 days ); has completed 2 doses   Serum BUN/Cr slowly trending up; will continue monitoring daily for dialysis needs  Now following PEXIVAS steroid taper; currently on Prednisone 20mg PO daily   Sodium bicarb 650mg PO BID   Continue Atovaquone for prophylaxis   Strict I/Os and chart   Avoid nephrotoxic agents as much as possible  No emergent need for hemodialysis at this time; will continue evaluation on a daily basis

## 2024-12-13 ENCOUNTER — DOCUMENTATION (OUTPATIENT)
Dept: HOME HEALTH SERVICES | Facility: HOME HEALTH | Age: 79
End: 2024-12-13
Payer: COMMERCIAL

## 2024-12-13 ENCOUNTER — HOME HEALTH ADMISSION (OUTPATIENT)
Dept: HOME HEALTH SERVICES | Facility: HOME HEALTH | Age: 79
End: 2024-12-13
Payer: COMMERCIAL

## 2024-12-13 VITALS
OXYGEN SATURATION: 95 % | RESPIRATION RATE: 16 BRPM | DIASTOLIC BLOOD PRESSURE: 51 MMHG | SYSTOLIC BLOOD PRESSURE: 101 MMHG | BODY MASS INDEX: 21.05 KG/M2 | HEART RATE: 56 BPM | HEIGHT: 66 IN | WEIGHT: 130.95 LBS | TEMPERATURE: 98.1 F

## 2024-12-13 LAB
ANION GAP SERPL CALC-SCNC: 14 MMOL/L (ref 10–20)
BUN SERPL-MCNC: 54 MG/DL (ref 6–23)
CALCIUM SERPL-MCNC: 9.2 MG/DL (ref 8.6–10.3)
CHLORIDE SERPL-SCNC: 103 MMOL/L (ref 98–107)
CO2 SERPL-SCNC: 23 MMOL/L (ref 21–32)
CREAT SERPL-MCNC: 1.63 MG/DL (ref 0.5–1.3)
EGFRCR SERPLBLD CKD-EPI 2021: 42 ML/MIN/1.73M*2
ERYTHROCYTE [DISTWIDTH] IN BLOOD BY AUTOMATED COUNT: 15 % (ref 11.5–14.5)
GLUCOSE SERPL-MCNC: 138 MG/DL (ref 74–99)
HCT VFR BLD AUTO: 33.9 % (ref 41–52)
HGB BLD-MCNC: 11 G/DL (ref 13.5–17.5)
MCH RBC QN AUTO: 28.1 PG (ref 26–34)
MCHC RBC AUTO-ENTMCNC: 32.4 G/DL (ref 32–36)
MCV RBC AUTO: 87 FL (ref 80–100)
NRBC BLD-RTO: ABNORMAL /100{WBCS}
PLATELET # BLD AUTO: 148 X10*3/UL (ref 150–450)
POTASSIUM SERPL-SCNC: 4.9 MMOL/L (ref 3.5–5.3)
RBC # BLD AUTO: 3.92 X10*6/UL (ref 4.5–5.9)
SODIUM SERPL-SCNC: 135 MMOL/L (ref 136–145)
WBC # BLD AUTO: 6.5 X10*3/UL (ref 4.4–11.3)

## 2024-12-13 PROCEDURE — 2500000004 HC RX 250 GENERAL PHARMACY W/ HCPCS (ALT 636 FOR OP/ED): Performed by: STUDENT IN AN ORGANIZED HEALTH CARE EDUCATION/TRAINING PROGRAM

## 2024-12-13 PROCEDURE — 2500000002 HC RX 250 W HCPCS SELF ADMINISTERED DRUGS (ALT 637 FOR MEDICARE OP, ALT 636 FOR OP/ED): Performed by: STUDENT IN AN ORGANIZED HEALTH CARE EDUCATION/TRAINING PROGRAM

## 2024-12-13 PROCEDURE — 2500000004 HC RX 250 GENERAL PHARMACY W/ HCPCS (ALT 636 FOR OP/ED): Performed by: EMERGENCY MEDICINE

## 2024-12-13 PROCEDURE — 36415 COLL VENOUS BLD VENIPUNCTURE: CPT | Performed by: STUDENT IN AN ORGANIZED HEALTH CARE EDUCATION/TRAINING PROGRAM

## 2024-12-13 PROCEDURE — 2500000001 HC RX 250 WO HCPCS SELF ADMINISTERED DRUGS (ALT 637 FOR MEDICARE OP): Performed by: PHARMACIST

## 2024-12-13 PROCEDURE — 2500000001 HC RX 250 WO HCPCS SELF ADMINISTERED DRUGS (ALT 637 FOR MEDICARE OP): Performed by: STUDENT IN AN ORGANIZED HEALTH CARE EDUCATION/TRAINING PROGRAM

## 2024-12-13 PROCEDURE — 2500000001 HC RX 250 WO HCPCS SELF ADMINISTERED DRUGS (ALT 637 FOR MEDICARE OP): Performed by: EMERGENCY MEDICINE

## 2024-12-13 PROCEDURE — 80048 BASIC METABOLIC PNL TOTAL CA: CPT | Performed by: STUDENT IN AN ORGANIZED HEALTH CARE EDUCATION/TRAINING PROGRAM

## 2024-12-13 PROCEDURE — 99239 HOSP IP/OBS DSCHRG MGMT >30: CPT | Performed by: EMERGENCY MEDICINE

## 2024-12-13 PROCEDURE — 85027 COMPLETE CBC AUTOMATED: CPT | Performed by: STUDENT IN AN ORGANIZED HEALTH CARE EDUCATION/TRAINING PROGRAM

## 2024-12-13 PROCEDURE — 2500000005 HC RX 250 GENERAL PHARMACY W/O HCPCS: Performed by: EMERGENCY MEDICINE

## 2024-12-13 RX ORDER — CLOTRIMAZOLE AND BETAMETHASONE DIPROPIONATE 10; .64 MG/G; MG/G
1 CREAM TOPICAL 2 TIMES DAILY
Qty: 30 G | Refills: 0 | Status: SHIPPED | OUTPATIENT
Start: 2024-12-13

## 2024-12-13 RX ORDER — DIPHENHYDRAMINE HCL 25 MG
25 TABLET ORAL EVERY 6 HOURS
Qty: 20 TABLET | Refills: 0 | Status: SHIPPED | OUTPATIENT
Start: 2024-12-13 | End: 2024-12-18

## 2024-12-13 RX ORDER — CLOTRIMAZOLE AND BETAMETHASONE DIPROPIONATE 10; .64 MG/G; MG/G
1 CREAM TOPICAL 2 TIMES DAILY
Qty: 30 G | Refills: 1 | Status: SHIPPED | OUTPATIENT
Start: 2024-12-13

## 2024-12-13 RX ORDER — ALLOPURINOL 100 MG/1
100 TABLET ORAL DAILY
Qty: 30 TABLET | Refills: 2 | Status: SHIPPED | OUTPATIENT
Start: 2024-12-13 | End: 2025-03-13

## 2024-12-13 RX ORDER — ALLOPURINOL 100 MG/1
100 TABLET ORAL DAILY
Qty: 30 TABLET | Refills: 2 | Status: SHIPPED | OUTPATIENT
Start: 2024-12-14 | End: 2025-03-14

## 2024-12-13 RX ORDER — PREDNISONE 20 MG/1
20 TABLET ORAL DAILY
Qty: 3 TABLET | Refills: 0 | Status: SHIPPED | OUTPATIENT
Start: 2024-12-14 | End: 2024-12-17

## 2024-12-13 RX ORDER — HYDROCODONE BITARTRATE AND ACETAMINOPHEN 5; 325 MG/1; MG/1
1 TABLET ORAL EVERY 6 HOURS PRN
Qty: 20 TABLET | Refills: 0 | Status: SHIPPED | OUTPATIENT
Start: 2024-12-13 | End: 2024-12-18

## 2024-12-13 RX ORDER — PREDNISONE 10 MG/1
20 TABLET ORAL DAILY
Qty: 6 TABLET | Refills: 0 | Status: SHIPPED | OUTPATIENT
Start: 2024-12-14 | End: 2024-12-17

## 2024-12-13 RX ORDER — CLINDAMYCIN HYDROCHLORIDE 150 MG/1
300 CAPSULE ORAL EVERY 8 HOURS SCHEDULED
Status: DISCONTINUED | OUTPATIENT
Start: 2024-12-13 | End: 2024-12-13 | Stop reason: HOSPADM

## 2024-12-13 RX ORDER — DIPHENHYDRAMINE HCL 25 MG
25 TABLET ORAL EVERY 6 HOURS PRN
Qty: 30 TABLET | Refills: 0 | Status: SHIPPED | OUTPATIENT
Start: 2024-12-13

## 2024-12-13 RX ORDER — DIPHENHYDRAMINE HCL 25 MG
25 TABLET ORAL EVERY 6 HOURS PRN
Status: DISCONTINUED | OUTPATIENT
Start: 2024-12-13 | End: 2024-12-13 | Stop reason: HOSPADM

## 2024-12-13 RX ORDER — CLINDAMYCIN HYDROCHLORIDE 300 MG/1
300 CAPSULE ORAL 4 TIMES DAILY
Qty: 28 CAPSULE | Refills: 0 | Status: SHIPPED | OUTPATIENT
Start: 2024-12-13 | End: 2024-12-20

## 2024-12-13 ASSESSMENT — COGNITIVE AND FUNCTIONAL STATUS - GENERAL
MOBILITY SCORE: 19
MOVING TO AND FROM BED TO CHAIR: A LITTLE
TOILETING: A LITTLE
WALKING IN HOSPITAL ROOM: A LITTLE
DAILY ACTIVITIY SCORE: 19
HELP NEEDED FOR BATHING: A LITTLE
DRESSING REGULAR LOWER BODY CLOTHING: A LITTLE
STANDING UP FROM CHAIR USING ARMS: A LITTLE
CLIMB 3 TO 5 STEPS WITH RAILING: A LITTLE
DRESSING REGULAR UPPER BODY CLOTHING: A LITTLE
TURNING FROM BACK TO SIDE WHILE IN FLAT BAD: A LITTLE
PERSONAL GROOMING: A LITTLE

## 2024-12-13 ASSESSMENT — PAIN - FUNCTIONAL ASSESSMENT
PAIN_FUNCTIONAL_ASSESSMENT: 0-10

## 2024-12-13 ASSESSMENT — PAIN SCALES - GENERAL
PAINLEVEL_OUTOF10: 0 - NO PAIN
PAINLEVEL_OUTOF10: 2
PAINLEVEL_OUTOF10: 4
PAINLEVEL_OUTOF10: 0 - NO PAIN
PAINLEVEL_OUTOF10: 0 - NO PAIN

## 2024-12-13 ASSESSMENT — PAIN DESCRIPTION - DESCRIPTORS
DESCRIPTORS: ACHING
DESCRIPTORS: ACHING

## 2024-12-13 NOTE — NURSING NOTE
Assumed care of pt from night shift, RN. Pt sitting in chair with call light in reach. 2+ pulses and cap refill brisk. Redness on RLE within marked border and decreased from previous day. Pt states pain is 0/10 . Lung sounds clear bilaterally. Pt eating breaking and denies any needs.

## 2024-12-13 NOTE — HH CARE COORDINATION
Home Care received a Referral for Physical Therapy and Occupational Therapy. We have processed the referral for a Start of Care on 12/15-12/16.     If you have any questions or concerns, please feel free to contact us at [phone]. Follow the prompts, enter your five digit zip code, and you will be directed to your care team on [facility].

## 2024-12-13 NOTE — NURSING NOTE
Reviewed medications and discharge instructions with patient at the bedside. Pt and family verbalized understanding. IV discontinued without incidence. Tip intact and pressure applied. Pt wheeled down via wheelchair and assisted into vehicle.

## 2024-12-13 NOTE — CARE PLAN
The patient's goals for the shift include pain control    The clinical goals for the shift include pain control / decreased swelling

## 2024-12-13 NOTE — CONSULTS
INFECTIOUS DISEASE INPATIENT INITIAL CONSULTATION    Referred By: Charity Long    Reason For Consult: progressive cellulitis     HPI:  This is a 79 y.o. male with PMH as below who presented with RLE redness/pain.    Had been on PO Keflex for cellulitis but not improving. On Clinda here and also steroids for suspected gout and leg/foot less painful with improving erythema/swelling/warmth.     Past Medical History:  He has a past medical history of Dry eye syndrome of left lacrimal gland (10/30/2015), Dry eye syndrome of left lacrimal gland (10/30/2015), Hordeolum internum left lower eyelid (12/04/2015), Noninfective gastroenteritis and colitis, unspecified, Other bursitis of knee, unspecified knee, Personal history of other diseases of the respiratory system, and Personal history of other endocrine, nutritional and metabolic disease.    Surgical History:  He has a past surgical history that includes Back surgery (11/15/2013); Hernia repair (11/15/2013); Vasectomy (11/15/2013); Total hip arthroplasty (11/15/2013); Lung lobectomy (01/13/2017); and Other surgical history (01/13/2017).    Allergies:  Pollen extracts, Bactrim [sulfamethoxazole-trimethoprim], Risedronate, and Sulfa (sulfonamide antibiotics)     Vitals (Last 24 Hours):  Heart Rate:  [51-64]   Temp:  [36.1 °C (97 °F)-36.5 °C (97.7 °F)]   Resp:  [16-18]   BP: ()/(52-65)   SpO2:  [95 %-99 %]      PHYSICAL EXAM:  Gen - NAD  RLE - resolving cellulitis  Skin - no rash    MEDS:    Current Facility-Administered Medications:     acetaminophen (Tylenol) tablet 650 mg, 650 mg, oral, q6h PRN, Rebekah Barakat MD, 650 mg at 12/13/24 0014    allopurinol (Zyloprim) tablet 100 mg, 100 mg, oral, Daily, Charity Long MD, 100 mg at 12/13/24 0855    apixaban (Eliquis) tablet 2.5 mg, 2.5 mg, oral, BID, Rebekah Barakat MD, 2.5 mg at 12/13/24 0639    atorvastatin (Lipitor) tablet 20 mg, 20 mg, oral, Daily, Rebekah Barakat MD, 20 mg at 12/12/24 1941     benzocaine-menthol (Cepastat Sore Throat) lozenge 1 lozenge, 1 lozenge, Mouth/Throat, q2h PRN, Rebekah Barakat MD    betamethasone (augmented) (Diprolene AF) 0.05 % cream, , Topical, BID, Charity Long MD, Given at 12/13/24 0855    carbidopa-levodopa (Sinemet)  mg per tablet 2 tablet, 2 tablet, oral, BID, Di Huynh, PharmD, 2 tablet at 12/13/24 1111    carbidopa-levodopa (Sinemet)  mg per tablet 2.5 tablet, 2.5 tablet, oral, BID, Di Huynh PharmD, 2.5 tablet at 12/12/24 1946    cholecalciferol (Vitamin D-3) tablet 1,000 Units, 1,000 Units, oral, Daily, Rebekah Barakat MD, 1,000 Units at 12/13/24 0854    cinacalcet (Sensipar) tablet 30 mg, 30 mg, oral, q48h, Charity Long MD, 30 mg at 12/12/24 2153    clindamycin (Cleocin) capsule 300 mg, 300 mg, oral, q8h ARMIN, Charity Long MD, 300 mg at 12/13/24 1344    diclofenac sodium (Voltaren) 1 % gel 4 g, 4 g, Topical, BID PRN, Rebekah Barakat MD    diphenhydrAMINE (Sominex) tablet 25 mg, 25 mg, oral, q6h PRN, Rebekah Barakat MD, 25 mg at 12/13/24 0348    docusate sodium (Colace) capsule 100 mg, 100 mg, oral, BID PRN, Rebekah Barakat MD    famotidine (Pepcid) tablet 10 mg, 10 mg, oral, Nightly, Rebekah Barakat MD, 10 mg at 12/12/24 2152    finasteride (Proscar) tablet 5 mg, 5 mg, oral, Nightly, Rebekah Barakat MD, 5 mg at 12/12/24 2151    gabapentin (Neurontin) capsule 100 mg, 100 mg, oral, BID, Rebekah Barakat MD, 100 mg at 12/13/24 0639    HYDROcodone-acetaminophen (Norco) 5-325 mg per tablet 1 tablet, 1 tablet, oral, q6h PRN, Rebekah Barakat MD    levothyroxine (Synthroid, Levoxyl) tablet 88 mcg, 88 mcg, oral, Daily before breakfast, Rebekah Barakat MD, 88 mcg at 12/13/24 0553    lubricating eye drops ophthalmic solution 1 drop, 1 drop, Both Eyes, PRN, Rebekah Barakat MD    melatonin tablet 3 mg, 3 mg, oral, Nightly PRN, Rebekah Barakat MD    nystatin (Mycostatin) 100,000 unit/gram  powder 1 Application, 1 Application, Topical, BID, Charity Long MD, 1 Application at 12/13/24 0854    ondansetron (Zofran) injection 4 mg, 4 mg, intravenous, q4h PRN, Rebekah Barakat MD    polyethylene glycol (Glycolax, Miralax) packet 17 g, 17 g, oral, Daily, Rebekah Barakat MD, 17 g at 12/13/24 0855    predniSONE (Deltasone) tablet 20 mg, 20 mg, oral, Daily, Charity Long MD, 20 mg at 12/13/24 0855    simethicone (Mylicon) chewable tablet 80 mg, 80 mg, oral, 4x daily PRN, Rebekah Barakat MD    sodium chloride (Ocean) 0.65 % nasal spray 1 spray, 1 spray, Each Nostril, 4x daily PRN, Charity Long MD, 1 spray at 12/11/24 1723    tafamidis (Vyndamax) capsule 61 mg, 61 mg, oral, Nightly, Di Huynh, PharmD, 61 mg at 12/11/24 2059    tamsulosin (Flomax) 24 hr capsule 0.4 mg, 0.4 mg, oral, Nightly, Rebekah Barakat MD, 0.4 mg at 12/12/24 2151    torsemide (Demadex) tablet 40 mg, 40 mg, oral, Daily, Charity Long MD, 40 mg at 12/13/24 0854     LABS:  Lab Results   Component Value Date    WBC 6.5 12/13/2024    HGB 11.0 (L) 12/13/2024    HCT 33.9 (L) 12/13/2024    MCV 87 12/13/2024     (L) 12/13/2024      Results from last 72 hours   Lab Units 12/13/24  0309   SODIUM mmol/L 135*   POTASSIUM mmol/L 4.9   CHLORIDE mmol/L 103   CO2 mmol/L 23   BUN mg/dL 54*   CREATININE mg/dL 1.63*   GLUCOSE mg/dL 138*   CALCIUM mg/dL 9.2   ANION GAP mmol/L 14   EGFR mL/min/1.73m*2 42*         Estimated Creatinine Clearance: 29.9 mL/min (A) (by C-G formula based on SCr of 1.63 mg/dL (H)).    ASSESSMENT/PLAN:    RLE Cellulitis - improving  Gout - resolving    I agree with current plans for Clinda and steroids. Should be on uric acid reduction therapy also.    Will sign off. Please call back with questions. Thanks! D/w Dr. Mercedes Lopez MD  [facility]  Office #[phone]

## 2024-12-13 NOTE — NURSING NOTE
Patient resting in bed. Morning labs drawn and sent to the lab. Vital signs stable. Complaining of itching related to rash in groin. Spoke to Dr. Barakat - order placed for diphenhydramine. Will medicate. Denies pain at this time. Call light is within reach and bed alarm is activated.

## 2024-12-13 NOTE — DISCHARGE SUMMARY
Discharge Diagnosis  Right lower leg cellulitis  Gout    Issues Requiring Follow-Up  Take medications as directed and follow-up with your PCP within the week    Discharge Meds     Medication List      START taking these medications     allopurinol 100 mg tablet; Commonly known as: Zyloprim; Take 1 tablet   (100 mg) by mouth once daily.; Start taking on: December 14, 2024   clindamycin 300 mg capsule; Commonly known as: Cleocin; Take 1 capsule   (300 mg) by mouth 4 times a day for 7 days.   clotrimazole-betamethasone cream; Commonly known as: Lotrisone; Apply 1   Application topically 2 times a day.   diphenhydrAMINE 25 mg tablet; Commonly known as: Sominex; Take 1 tablet   (25 mg) by mouth every 6 hours if needed for itching or allergies.   HYDROcodone-acetaminophen 5-325 mg tablet; Commonly known as: Norco;   Take 1 tablet by mouth every 6 hours if needed for severe pain (7 - 10)   for up to 5 days.   predniSONE 20 mg tablet; Commonly known as: Deltasone; Take 1 tablet (20   mg) by mouth once daily for 3 doses.; Start taking on: December 14, 2024     CHANGE how you take these medications     torsemide 20 mg tablet; Commonly known as: Demadex; Take 1 tablet (20   mg) by mouth once daily as needed (Please take 20mg torsemide daily if   weight increases >2 lbs over a two day period. Please contact cardiologist   at those times for further instructions.).; What changed: how much to   take, when to take this, additional instructions     CONTINUE taking these medications     apixaban 2.5 mg tablet; Commonly known as: Eliquis   atorvastatin 20 mg tablet; Commonly known as: Lipitor   azelastine 137 mcg (0.1 %) nasal spray; Commonly known as: Astelin   carbidopa-levodopa  mg tablet; Commonly known as: Sinemet   cholecalciferol 25 MCG (1000 UT) capsule; Commonly known as: Vitamin D-3   cinacalcet 30 mg tablet; Commonly known as: Sensipar   coenzyme Q-10 100 mg capsule   diclofenac sodium 1 % gel; Commonly known as:  Voltaren   famotidine 20 mg tablet; Commonly known as: Pepcid   finasteride 5 mg tablet; Commonly known as: Proscar; Take 1 tablet (5   mg) by mouth once daily. Do not crush, chew, or split.   gabapentin 100 mg capsule; Commonly known as: Neurontin   glucosamine-chondroit-vit C-Mn 500-400 mg capsule   ipratropium 21 mcg (0.03 %) nasal spray; Commonly known as: Atrovent   levothyroxine 88 mcg tablet; Commonly known as: Synthroid, Levoxyl   multivitamin tablet   olopatadine 0.1 % ophthalmic solution; Commonly known as: Patanol   tafamidis 61 mg capsule; Commonly known as: Vyndamax   tamsulosin 0.4 mg 24 hr capsule; Commonly known as: Flomax; Take 1   capsule (0.4 mg) by mouth once daily.       Test Results Pending At Discharge  Pending Labs       No current pending labs.            Hospital Course   79-year-old male admitted with cellulitis of the right lower leg that did not respond to outpatient oral antibiotic; he was started on IV clindamycin and has responded well; his white count has always remained normal; podiatry saw him and determined he did not have an avulsion fracture but felt he did need follow-up with podiatry; patient was very pleased with his podiatrist and I will be sure to give him his name for follow-up; additionally his uric acid was elevated has a history of gout and has been started on allopurinol with a short course of oral steroids.  He is unable to take nonsteroidal secondary to Eliquis  He has remained clinically stable and will be discharged home on oral antibiotics with follow-up with his PCP and podiatry.    Pertinent Physical Exam At Time of Discharge  Physical Exam  Patient is alert in no acute distress  Lungs are clear to auscultation and percussion  Cardiac is regular rate and rhythm normal S1-S2 without murmur gallop rub click S3 or S4  Abdomen is soft nontender positive bowel sounds there is no hepatosplenomegaly or CVA tenderness appreciated  Extremities are significant for right  anterior tibia erythema it is no longer warm; erythema of right dorsal surface of foot has become less swelling is down able to mobilize the ankle with little pain  Outpatient Follow-Up  Future Appointments   Date Time Provider Department Center   3/4/2025 10:00 AM U CT 1 [facility]   3/5/2025  4:15 PM [id number]         Charity Long MD

## 2024-12-13 NOTE — NURSING NOTE
Patient resting in bed. Complaining of 4/10 pain/aching in his right leg. Medicated with tylenol per doctor's orders. Legs repositioned and elevated with pillows. No other stated needs at this time. Call light is within reach and bed alarm is activated.

## 2024-12-13 NOTE — CARE PLAN
The patient's goals for the shift include pain control    The clinical goals for the shift include pain control/decreased swelling

## 2024-12-13 NOTE — PROGRESS NOTES
Patient seen, chart reviewed.  Complains of worsening edema today due to not elevating his legs.  Pain is controlled at this time.  Vital signs are stable.         Rebekah Baraakt MD

## 2024-12-13 NOTE — NURSING NOTE
Obtained patient's vitals while he is sitting up in chair. Patient's call light and phone within reach.

## 2024-12-13 NOTE — NURSING NOTE
Assumed care of patient. Report received from QUINTEN Mattson. Patient lying in bed watching TV. Vital signs stable, no acute distress. Edema noted to bilateral lower extremities with 2+ pulses and brisk capillary refill - lower extremities elevated on pillows. Redness noted on right lower leg consistent with known cellulitis. Denies any pain at this time. External catheter draining clear yellow urine. No brief to be used due to allergic reaction to briefs with redness to area. No stated needs at this time. Call light is within reach and bed alarm is activated.

## 2024-12-17 ENCOUNTER — HOME CARE VISIT (OUTPATIENT)
Dept: HOME HEALTH SERVICES | Facility: HOME HEALTH | Age: 79
End: 2024-12-17
Payer: COMMERCIAL

## 2024-12-17 VITALS — SYSTOLIC BLOOD PRESSURE: 104 MMHG | HEART RATE: 46 BPM | DIASTOLIC BLOOD PRESSURE: 60 MMHG | OXYGEN SATURATION: 98 %

## 2024-12-17 PROCEDURE — G0151 HHCP-SERV OF PT,EA 15 MIN: HCPCS

## 2024-12-17 PROCEDURE — 169592 NO-PAY CLAIM PROCEDURE

## 2024-12-17 ASSESSMENT — ACTIVITIES OF DAILY LIVING (ADL)
OASIS_M1830: 03
ENTERING_EXITING_HOME: NEEDS ASSISTANCE
AMBULATION ASSISTANCE ON FLAT SURFACES: 1

## 2024-12-17 ASSESSMENT — ENCOUNTER SYMPTOMS
PAIN LOCATION - PAIN FREQUENCY: CONSTANT
PAIN LOCATION - PAIN QUALITY: SHOOTING
PAIN LOCATION: RIGHT LEG
PAIN: 1
PAIN LOCATION - PAIN SEVERITY: 5/10

## 2024-12-19 ENCOUNTER — HOME CARE VISIT (OUTPATIENT)
Dept: HOME HEALTH SERVICES | Facility: HOME HEALTH | Age: 79
End: 2024-12-19
Payer: COMMERCIAL

## 2024-12-19 VITALS
DIASTOLIC BLOOD PRESSURE: 70 MMHG | RESPIRATION RATE: 16 BRPM | OXYGEN SATURATION: 100 % | HEART RATE: 60 BPM | SYSTOLIC BLOOD PRESSURE: 115 MMHG

## 2024-12-19 PROCEDURE — G0157 HHC PT ASSISTANT EA 15: HCPCS | Mod: CQ

## 2024-12-19 ASSESSMENT — ACTIVITIES OF DAILY LIVING (ADL)
AMBULATION ASSISTANCE: STAND BY ASSIST
AMBULATION ASSISTANCE ON FLAT SURFACES: 1
CURRENT_FUNCTION: STAND BY ASSIST
AMBULATION_DISTANCE/DURATION_TOLERATED: 35 FT

## 2024-12-19 ASSESSMENT — ENCOUNTER SYMPTOMS
PAIN SEVERITY GOAL: 0/10
PAIN LOCATION - PAIN QUALITY: SHOOTING
SUBJECTIVE PAIN PROGRESSION: GRADUALLY IMPROVING
PERSON REPORTING PAIN: PATIENT
LOWEST PAIN SEVERITY IN PAST 24 HOURS: 4/10
PAIN LOCATION - PAIN SEVERITY: 5/10
HIGHEST PAIN SEVERITY IN PAST 24 HOURS: 6/10
LIMITED RANGE OF MOTION: 1
PAIN LOCATION: RIGHT LEG
PAIN: 1
MUSCLE WEAKNESS: 1

## 2024-12-23 ENCOUNTER — HOME CARE VISIT (OUTPATIENT)
Dept: HOME HEALTH SERVICES | Facility: HOME HEALTH | Age: 79
End: 2024-12-23
Payer: COMMERCIAL

## 2024-12-23 VITALS
OXYGEN SATURATION: 90 % | DIASTOLIC BLOOD PRESSURE: 70 MMHG | HEART RATE: 66 BPM | RESPIRATION RATE: 16 BRPM | SYSTOLIC BLOOD PRESSURE: 110 MMHG

## 2024-12-23 DIAGNOSIS — R39.9 LOWER URINARY TRACT SYMPTOMS (LUTS): ICD-10-CM

## 2024-12-23 PROCEDURE — G0157 HHC PT ASSISTANT EA 15: HCPCS | Mod: CQ

## 2024-12-23 RX ORDER — TAMSULOSIN HYDROCHLORIDE 0.4 MG/1
0.4 CAPSULE ORAL DAILY
Qty: 30 CAPSULE | Refills: 5 | Status: SHIPPED | OUTPATIENT
Start: 2024-12-23 | End: 2025-06-21

## 2024-12-23 ASSESSMENT — ENCOUNTER SYMPTOMS
PAIN LOCATION - PAIN SEVERITY: 5/10
PAIN LOCATION - PAIN QUALITY: ACHE
SUBJECTIVE PAIN PROGRESSION: WAXING AND WANING
LOWEST PAIN SEVERITY IN PAST 24 HOURS: 4/10
PAIN: 1
PERSON REPORTING PAIN: PATIENT
HIGHEST PAIN SEVERITY IN PAST 24 HOURS: 7/10
PAIN LOCATION: LEFT KNEE

## 2024-12-23 ASSESSMENT — ACTIVITIES OF DAILY LIVING (ADL)
AMBULATION ASSISTANCE ON FLAT SURFACES: 1
AMBULATION_DISTANCE/DURATION_TOLERATED: 40 FT

## 2024-12-26 ENCOUNTER — HOME CARE VISIT (OUTPATIENT)
Dept: HOME HEALTH SERVICES | Facility: HOME HEALTH | Age: 79
End: 2024-12-26
Payer: COMMERCIAL

## 2024-12-26 PROCEDURE — G0157 HHC PT ASSISTANT EA 15: HCPCS | Mod: CQ

## 2024-12-26 SDOH — HEALTH STABILITY: PHYSICAL HEALTH: EXERCISE TYPE: BLE STANDING

## 2024-12-26 SDOH — HEALTH STABILITY: PHYSICAL HEALTH: EXERCISE COMMENTS: PT COMPLETED BLE STANDING: 2 X15 REPS  MARCHES  HIP ABD  HIP EXT

## 2024-12-26 ASSESSMENT — ENCOUNTER SYMPTOMS
LIMITED RANGE OF MOTION: 1
SUBJECTIVE PAIN PROGRESSION: WAXING AND WANING
PAIN SEVERITY GOAL: 0/10
PAIN LOCATION - PAIN QUALITY: ACHE
PAIN LOCATION - PAIN SEVERITY: 4/10
PERSON REPORTING PAIN: PATIENT
HIGHEST PAIN SEVERITY IN PAST 24 HOURS: 6/10
LOWEST PAIN SEVERITY IN PAST 24 HOURS: 2/10
ARTHRALGIAS: 1
PAIN LOCATION: GENERALIZED
PAIN: 1
MUSCLE WEAKNESS: 1

## 2024-12-26 ASSESSMENT — ACTIVITIES OF DAILY LIVING (ADL)
AMBULATION_DISTANCE/DURATION_TOLERATED: 100 FT
AMBULATION ASSISTANCE ON FLAT SURFACES: 1
AMBULATION ASSISTANCE: STAND BY ASSIST
CURRENT_FUNCTION: STAND BY ASSIST

## 2024-12-27 ENCOUNTER — HOME CARE VISIT (OUTPATIENT)
Dept: HOME HEALTH SERVICES | Facility: HOME HEALTH | Age: 79
End: 2024-12-27
Payer: COMMERCIAL

## 2024-12-27 VITALS
SYSTOLIC BLOOD PRESSURE: 117 MMHG | HEART RATE: 57 BPM | DIASTOLIC BLOOD PRESSURE: 67 MMHG | TEMPERATURE: 98.2 F | RESPIRATION RATE: 18 BRPM

## 2024-12-27 PROCEDURE — G0152 HHCP-SERV OF OT,EA 15 MIN: HCPCS

## 2024-12-30 ENCOUNTER — HOME CARE VISIT (OUTPATIENT)
Dept: HOME HEALTH SERVICES | Facility: HOME HEALTH | Age: 79
End: 2024-12-30
Payer: COMMERCIAL

## 2024-12-30 VITALS
DIASTOLIC BLOOD PRESSURE: 65 MMHG | OXYGEN SATURATION: 100 % | RESPIRATION RATE: 16 BRPM | SYSTOLIC BLOOD PRESSURE: 100 MMHG | HEART RATE: 56 BPM

## 2024-12-30 PROCEDURE — G0157 HHC PT ASSISTANT EA 15: HCPCS | Mod: CQ

## 2024-12-31 SDOH — HEALTH STABILITY: PHYSICAL HEALTH: EXERCISE TYPE: STANDING

## 2024-12-31 SDOH — HEALTH STABILITY: PHYSICAL HEALTH: EXERCISE COMMENTS: STEP-UPS FORWARD AND SIDEWAYS 2 X10 REPS EACXH LE.

## 2024-12-31 ASSESSMENT — ENCOUNTER SYMPTOMS
PAIN LOCATION - PAIN SEVERITY: 4/10
PAIN LOCATION: GENERALIZED
MUSCLE WEAKNESS: 1
LIMITED RANGE OF MOTION: 1
PAIN: 1
PERSON REPORTING PAIN: PATIENT

## 2024-12-31 ASSESSMENT — ACTIVITIES OF DAILY LIVING (ADL)
AMBULATION ASSISTANCE ON UNEVEN SURFACES: 1
AMBULATION ASSISTANCE: STAND BY ASSIST
AMBULATION_DISTANCE/DURATION_TOLERATED: 150 FT
CURRENT_FUNCTION: STAND BY ASSIST

## 2025-01-01 ENCOUNTER — HOSPITAL ENCOUNTER (EMERGENCY)
Facility: HOSPITAL | Age: 80
End: 2025-05-05
Attending: EMERGENCY MEDICINE
Payer: COMMERCIAL

## 2025-01-01 DIAGNOSIS — I46.9 CARDIAC ARREST: Primary | ICD-10-CM

## 2025-01-01 PROCEDURE — 96374 THER/PROPH/DIAG INJ IV PUSH: CPT | Mod: 59

## 2025-01-01 PROCEDURE — 92950 HEART/LUNG RESUSCITATION CPR: CPT

## 2025-01-01 PROCEDURE — 2500000004 HC RX 250 GENERAL PHARMACY W/ HCPCS (ALT 636 FOR OP/ED): Mod: JZ | Performed by: EMERGENCY MEDICINE

## 2025-01-01 PROCEDURE — 99285 EMERGENCY DEPT VISIT HI MDM: CPT | Mod: 25

## 2025-01-01 PROCEDURE — 99284 EMERGENCY DEPT VISIT MOD MDM: CPT | Mod: 25 | Performed by: EMERGENCY MEDICINE

## 2025-01-01 RX ORDER — EPINEPHRINE 0.1 MG/ML
INJECTION INTRACARDIAC; INTRAVENOUS CODE/TRAUMA/SEDATION MEDICATION
Status: COMPLETED | OUTPATIENT
Start: 2025-01-01 | End: 2025-01-01

## 2025-01-01 RX ORDER — FENTANYL CITRATE 50 UG/ML
INJECTION, SOLUTION INTRAMUSCULAR; INTRAVENOUS CODE/TRAUMA/SEDATION MEDICATION
Status: COMPLETED | OUTPATIENT
Start: 2025-01-01 | End: 2025-01-01

## 2025-01-01 RX ORDER — FENTANYL CITRATE 50 UG/ML
INJECTION, SOLUTION INTRAMUSCULAR; INTRAVENOUS
Status: DISCONTINUED
Start: 2025-01-01 | End: 2025-01-01 | Stop reason: HOSPADM

## 2025-01-01 RX ADMIN — FENTANYL CITRATE 50 MCG: 50 INJECTION, SOLUTION INTRAMUSCULAR; INTRAVENOUS at 10:52

## 2025-01-01 RX ADMIN — EPINEPHRINE 1 MG: 0.1 INJECTION INTRACARDIAC; INTRAVENOUS at 10:46

## 2025-01-02 ENCOUNTER — HOME CARE VISIT (OUTPATIENT)
Dept: HOME HEALTH SERVICES | Facility: HOME HEALTH | Age: 80
End: 2025-01-02
Payer: COMMERCIAL

## 2025-01-02 VITALS — DIASTOLIC BLOOD PRESSURE: 60 MMHG | SYSTOLIC BLOOD PRESSURE: 110 MMHG

## 2025-01-02 PROCEDURE — G0157 HHC PT ASSISTANT EA 15: HCPCS | Mod: CQ

## 2025-01-02 ASSESSMENT — ENCOUNTER SYMPTOMS
PAIN: 1
PERSON REPORTING PAIN: PATIENT
PAIN LOCATION - PAIN FREQUENCY: INTERMITTENT
LIMITED RANGE OF MOTION: 1
PAIN LOCATION - PAIN SEVERITY: 4/10
MUSCLE WEAKNESS: 1
PAIN LOCATION - PAIN QUALITY: RADIATING
PAIN LOCATION: RIGHT LEG

## 2025-01-02 ASSESSMENT — ACTIVITIES OF DAILY LIVING (ADL)
AMBULATION_DISTANCE/DURATION_TOLERATED: 200+
AMBULATION ASSISTANCE: STAND BY ASSIST
CURRENT_FUNCTION: STAND BY ASSIST
AMBULATION ASSISTANCE ON UNEVEN SURFACES: 1

## 2025-01-03 ENCOUNTER — HOME CARE VISIT (OUTPATIENT)
Dept: HOME HEALTH SERVICES | Facility: HOME HEALTH | Age: 80
End: 2025-01-03
Payer: COMMERCIAL

## 2025-01-06 ENCOUNTER — HOME CARE VISIT (OUTPATIENT)
Dept: HOME HEALTH SERVICES | Facility: HOME HEALTH | Age: 80
End: 2025-01-06
Payer: COMMERCIAL

## 2025-01-08 ENCOUNTER — HOME CARE VISIT (OUTPATIENT)
Dept: HOME HEALTH SERVICES | Facility: HOME HEALTH | Age: 80
End: 2025-01-08
Payer: COMMERCIAL

## 2025-01-08 VITALS
TEMPERATURE: 98.2 F | HEART RATE: 79 BPM | SYSTOLIC BLOOD PRESSURE: 112 MMHG | RESPIRATION RATE: 16 BRPM | DIASTOLIC BLOOD PRESSURE: 62 MMHG

## 2025-01-08 VITALS
OXYGEN SATURATION: 99 % | RESPIRATION RATE: 14 BRPM | SYSTOLIC BLOOD PRESSURE: 118 MMHG | HEART RATE: 68 BPM | DIASTOLIC BLOOD PRESSURE: 75 MMHG

## 2025-01-08 PROCEDURE — G0157 HHC PT ASSISTANT EA 15: HCPCS | Mod: CQ

## 2025-01-08 PROCEDURE — G0158 HHC OT ASSISTANT EA 15: HCPCS | Mod: CO

## 2025-01-08 ASSESSMENT — ACTIVITIES OF DAILY LIVING (ADL)
AMBULATION_DISTANCE/DURATION_TOLERATED: 50 FT
CURRENT_FUNCTION: STAND BY ASSIST
AMBULATION ASSISTANCE ON UNEVEN SURFACES: 1
AMBULATION ASSISTANCE: STAND BY ASSIST

## 2025-01-08 ASSESSMENT — ENCOUNTER SYMPTOMS
DENIES PAIN: 1
LIMITED RANGE OF MOTION: 1
MUSCLE WEAKNESS: 1
PERSON REPORTING PAIN: PATIENT

## 2025-01-08 ASSESSMENT — PAIN DESCRIPTION - PAIN TYPE: TYPE: ACUTE PAIN

## 2025-01-10 ENCOUNTER — HOME CARE VISIT (OUTPATIENT)
Dept: HOME HEALTH SERVICES | Facility: HOME HEALTH | Age: 80
End: 2025-01-10
Payer: COMMERCIAL

## 2025-01-13 ENCOUNTER — HOME CARE VISIT (OUTPATIENT)
Dept: HOME HEALTH SERVICES | Facility: HOME HEALTH | Age: 80
End: 2025-01-13
Payer: COMMERCIAL

## 2025-01-13 ASSESSMENT — ACTIVITIES OF DAILY LIVING (ADL)
HOME_HEALTH_OASIS: 00
OASIS_M1830: 03
AMBULATION ASSISTANCE ON FLAT SURFACES: 1

## 2025-01-13 NOTE — CASE COMMUNICATION
Patient requested OTdiscipline discharge visit stating multiple personal appointments and appreciation for recommendations and servces provided.

## 2025-02-20 ENCOUNTER — TELEPHONE (OUTPATIENT)
Dept: INFECTIOUS DISEASES | Facility: HOSPITAL | Age: 80
End: 2025-02-20
Payer: COMMERCIAL

## 2025-02-20 DIAGNOSIS — L03.115 CELLULITIS OF RIGHT LEG: Primary | ICD-10-CM

## 2025-02-20 RX ORDER — CEPHALEXIN 500 MG/1
500 CAPSULE ORAL EVERY 6 HOURS
Qty: 28 CAPSULE | Refills: 0 | Status: SHIPPED | OUTPATIENT
Start: 2025-02-20 | End: 2025-02-27

## 2025-02-20 NOTE — PROGRESS NOTES
Patient called, redness in the RLE. No fever. Small open sore top of the right foot. Will send Keflex 500mg QID for 7D course. He will call if not improving. Doesn't seem like gout by description right now.

## 2025-03-04 ENCOUNTER — HOSPITAL ENCOUNTER (OUTPATIENT)
Dept: RADIOLOGY | Facility: HOSPITAL | Age: 80
Discharge: HOME | End: 2025-03-04
Payer: COMMERCIAL

## 2025-03-04 DIAGNOSIS — C34.90 MALIGNANT NEOPLASM OF LUNG, UNSPECIFIED LATERALITY, UNSPECIFIED PART OF LUNG (MULTI): ICD-10-CM

## 2025-03-04 PROCEDURE — 71250 CT THORAX DX C-: CPT

## 2025-03-04 NOTE — PROGRESS NOTES
Subjective   Brady Ortega  is a 79 y.o. male who presents for lung cancer surveillance on video call. He is in his home       Mr. Ortega underwent a VATS left lower lobectomy on 1/5/17 for a small [id number] adenocarcinoma of the lung.     Currently the patient is presenting for routine follow-up and in their usual state of health. He reports the following symptoms: cough and denies the following symptoms: chest pain, shortness of breath at rest, shortness of breath with activity, hemoptysis, fevers, chills, weight loss, and abdominal pain.      No acute abdomen symptoms, no abdominal pain, cramping      There have been no significant changes to their documented medical, surgical and family history.   Follows with [facility] for majority of his care.     He  reports that he quit smoking about 60 years ago. His smoking use included cigarettes. He has been exposed to tobacco smoke. He has never used smokeless tobacco. He reports that he does not drink alcohol and does not use drugs.    Objective   Physical Exam  The patient appeared well on the video system. They were alert and oriented. (audio and video evaluation)    Diagnostic Studies  I have reviewed a CT 3/4/25  IMPRESSION:  1. Increasing size of the right pleural effusion. Persistent  compressive atelectasis right lung base posterior basilar segment.  2. Minimal pericardial thickening  3. Low-density within the liver difficult to assess on this  examination appearing grossly unchanged from prior examination. If  there is further concern a post-contrast CT or MRI examination would  be of value.  4. Mottled air juxtaposed between the liver and right kidney probably  colonic air.  5. Cholelithiasis    Assessment/Plan   I believe that the patient is doing well.     Based on the patient's clinical presentation and my review of their radiographic imaging, I believe they have no evidence of cancer recurrence.  I recommend ongoing radiographic screening.    I recommend CT  chest 12 months and video virtual follow up    Regarding the right pleural effusion. This is likely related to his known CHF, CKD and fluid overload managed by [facility] Dr Jame Merritt, Dr Milagros Krishnan. As the patient reports a new cough, recommend follow up      I discussed this in detail with the patient, including a discussion of alternatives. They were comfortable with this approach.       Renay Diaz PA-C  Department of Thoracic and Esophageal Surgery  [facility]  [phone]

## 2025-03-05 ENCOUNTER — TELEMEDICINE (OUTPATIENT)
Dept: SURGERY | Facility: HOSPITAL | Age: 80
End: 2025-03-05
Payer: COMMERCIAL

## 2025-03-05 DIAGNOSIS — C34.90 MALIGNANT NEOPLASM OF LUNG, UNSPECIFIED LATERALITY, UNSPECIFIED PART OF LUNG (MULTI): Primary | ICD-10-CM

## 2025-03-05 PROCEDURE — 99214 OFFICE O/P EST MOD 30 MIN: CPT | Performed by: PHYSICIAN ASSISTANT

## 2025-03-05 PROCEDURE — G2211 COMPLEX E/M VISIT ADD ON: HCPCS | Performed by: PHYSICIAN ASSISTANT

## 2025-03-05 PROCEDURE — 1157F ADVNC CARE PLAN IN RCRD: CPT | Performed by: PHYSICIAN ASSISTANT

## 2025-05-05 NOTE — ED TRIAGE NOTES
Pt arrived via EMS after collapsing while at a store today. Pt was found to be in cardiac arrest. EMS gave 4 rounds of epi, shocked 5 times, and gave 3 rounds of amiodarone prior to arrival. Upon arrival pt was on Ashok machine and code protocols began immediately.

## 2025-05-05 NOTE — ED PROVIDER NOTES
History of Present Illness     History provided by: Patient  Limitations to History: Unresponsive  External Records Reviewed with Brief Summary: Previous records showing past medical history, past documentation of DNR    HPI:  Brady Ortega is a 80 y.o. male with past medical story of paroxysmal atrial fibrillation, hypothyroidism, diastolic congestive heart failure, Parkinson's, hyperlipidemia, CKD, history of lung cancer, osteoarthritis presents emergency room as a V fib cardiac arrest.  Per EMS, patient was found down by himself in a parking lot.  Patient was found to be without pulse and EMS started CPR approximately 25 minutes prior to arrival.  Patient was given 4 rounds of epinephrine, shocked 5 times for ventricular fibrillation.  Also received 300 mg of IV amiodarone prior to arrival.  Patient arrived with Ashok device there was continuing CPR.     Physical Exam   Triage vitals:  T    HR    BP    RR    O2        General: Unresponsive  Eyes: No scleral icterus or injection, pupils were nonreactive  HENT: Normo-cephalic, atraumatic. No stridor  CV: Tachycardic rate, irregular rhythm. Radial pulses 0+ bilaterally, 0+ femoral   Resp: Intubated, with rise of chest wall being bagged, clear to auscultation bilaterally  GI: Soft, non-distended, non-tender. No rebound or guarding.  : Deferred  MSK/Extremities: No gross bony deformities. Moving all extremities, left lower extremity shows IO placed  Skin: Warm. Appropriate color  Neuro: Alert. Oriented. Face symmetric. Speech is fluent.  Gross strength and sensation intact in b/l UE and LEs  Psych: Appropriate mood and affect    Medical Decision Making & ED Course   Medical Decision Makin y.o. male with past medical story of paroxysmal atrial fibrillation, hypothyroidism, diastolic congestive heart failure, Parkinson's, hyperlipidemia, CKD, history of lung cancer, osteoarthritis presents emergency room as a V fib cardiac arrest.  Patient was found to  be in V-fib cardiac arrest.  Patient was intubated in the field and was checked by attending physician, received a chest rise.  Patient placed on monitor and still found to be in V-fib arrest.  1 dose of epinephrine was given along with 50 mg of fentanyl for pain control..  Pulses were checked and patient continued to pulseless and V-fib.  I spoke to the family who confirmed DNR was submitted in the chart and CPR was stopped.  Patient continues to have no pulses and was in ventricular fibrillation.  Ultrasound was used to verify that there was no cardiac activity prior to calling time of death at 10:21 am.  Family was updated and patient was made agreeable to be seen by family.     Social Determinants of Health which Significantly Impact Care: None identified The following actions were taken to address these social determinants: None    EKG Independent Interpretation: EKG not obtained    Independent Result Review and Interpretation: None obtained    Chronic conditions affecting the patient's care: As documented above in MDM    The patient was discussed with the following consultants/services: None    Care Considerations: As documented above in [facility]    ED Course:  Diagnoses as of 25 0905   Cardiac arrest     Disposition       Procedures   Procedures    Patient seen and discussed with ED attending physician.    Krystina Ortiz MD  Emergency Medicine     Krystina Ortiz MD  Resident  [phone]       Krystina Ortiz MD  Resident  25 [phone]

## 2025-05-06 NOTE — ED PROCEDURE NOTE
Procedure  Critical Care    Performed by: Rito Torres MD  Authorized by: Rito Torres MD    Critical care provider statement:     Critical care time (minutes):  10    Critical care time was exclusive of:  Separately billable procedures and treating other patients    Critical care was necessary to treat or prevent imminent or life-threatening deterioration of the following conditions:  Cardiac failure    Critical care was time spent personally by me on the following activities:  Development of treatment plan with patient or surrogate, examination of patient, interpretation of cardiac output measurements and obtaining history from patient or surrogate               Rito Torres MD  05/06/25 2377

## 2025-06-02 ENCOUNTER — APPOINTMENT (OUTPATIENT)
Dept: ORTHOPEDIC SURGERY | Facility: CLINIC | Age: 80
End: 2025-06-02
Payer: COMMERCIAL

## 2026-03-05 ENCOUNTER — APPOINTMENT (OUTPATIENT)
Dept: RADIOLOGY | Facility: HOSPITAL | Age: 81
End: 2026-03-05
Payer: COMMERCIAL

## 2026-03-11 ENCOUNTER — APPOINTMENT (OUTPATIENT)
Dept: SURGERY | Facility: HOSPITAL | Age: 81
End: 2026-03-11
Payer: COMMERCIAL